# Patient Record
Sex: FEMALE | Race: OTHER | NOT HISPANIC OR LATINO | Employment: PART TIME | URBAN - METROPOLITAN AREA
[De-identification: names, ages, dates, MRNs, and addresses within clinical notes are randomized per-mention and may not be internally consistent; named-entity substitution may affect disease eponyms.]

---

## 2019-08-29 ENCOUNTER — HOSPITAL ENCOUNTER (OUTPATIENT)
Dept: RADIOLOGY | Facility: MEDICAL CENTER | Age: 29
End: 2019-08-29
Attending: NEUROLOGICAL SURGERY
Payer: MEDICAID

## 2019-08-29 DIAGNOSIS — M25.552 LEFT HIP PAIN: ICD-10-CM

## 2019-08-29 PROCEDURE — 73502 X-RAY EXAM HIP UNI 2-3 VIEWS: CPT | Mod: LT

## 2019-08-29 PROCEDURE — 73721 MRI JNT OF LWR EXTRE W/O DYE: CPT | Mod: LT

## 2019-08-30 ENCOUNTER — TELEPHONE (OUTPATIENT)
Dept: MEDICAL GROUP | Facility: MEDICAL CENTER | Age: 29
End: 2019-08-30

## 2020-02-16 ENCOUNTER — OFFICE VISIT (OUTPATIENT)
Dept: URGENT CARE | Facility: CLINIC | Age: 30
End: 2020-02-16
Payer: MEDICAID

## 2020-02-16 VITALS
DIASTOLIC BLOOD PRESSURE: 76 MMHG | SYSTOLIC BLOOD PRESSURE: 102 MMHG | HEART RATE: 72 BPM | RESPIRATION RATE: 18 BRPM | TEMPERATURE: 99 F | BODY MASS INDEX: 27.29 KG/M2 | WEIGHT: 139 LBS | OXYGEN SATURATION: 99 % | HEIGHT: 60 IN

## 2020-02-16 DIAGNOSIS — H66.92 ACUTE LEFT OTITIS MEDIA: ICD-10-CM

## 2020-02-16 DIAGNOSIS — J02.9 PHARYNGITIS, UNSPECIFIED ETIOLOGY: ICD-10-CM

## 2020-02-16 PROCEDURE — 99202 OFFICE O/P NEW SF 15 MIN: CPT | Performed by: FAMILY MEDICINE

## 2020-02-16 RX ORDER — RIZATRIPTAN BENZOATE 5 MG/1
TABLET ORAL
Status: ON HOLD | COMMUNITY
Start: 2019-12-11 | End: 2020-08-29

## 2020-02-16 RX ORDER — AMOXICILLIN 500 MG/1
500 CAPSULE ORAL 3 TIMES DAILY
Qty: 30 CAP | Refills: 0 | Status: ON HOLD | OUTPATIENT
Start: 2020-02-16 | End: 2020-08-29

## 2020-02-16 ASSESSMENT — ENCOUNTER SYMPTOMS
SHORTNESS OF BREATH: 0
COUGH: 1
FEVER: 0
SORE THROAT: 1
DIZZINESS: 0
VOMITING: 0
EYE REDNESS: 0
NAUSEA: 0
CHILLS: 0
MYALGIAS: 0

## 2020-02-16 NOTE — PROGRESS NOTES
Subjective:   Luba Lind is a 29 y.o. female who presents for Ear Pain ((L) both hurt but left is the worse,(L)-x4 days side of throat hurts,slight cough-x2 days)        29-year-old female presents to urgent care with chief complaint of left ear pain and left-sided hearing loss over the past 4 days.  Patient complains of sore throat, cough for the past 2 days.    Otalgia    There is pain in the left ear. This is a new problem. The current episode started in the past 7 days. Maximum temperature: Subjective fever. Associated symptoms include coughing and a sore throat. Pertinent negatives include no rash or vomiting.     PMH:  has a past medical history of Fetal pyelectasis and EIC. negative maternal quad screen. 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 She also has no past medical history of Addisons disease ([facility]), Adrenal disorder (HCC), Allergy, Anemia, Anxiety, Blood transfusion, CHF (congestive heart failure) ([facility]), Clotting disorder (HCC), COPD, Cushings syndrome ([facility]), Depression, Diabetic neuropathy (HCC), GERD (gastroesophageal reflux disease), Goiter, Heart attack ([facility]), HIV (human immunodeficiency virus infection), Hyperlipidemia, IBD (inflammatory bowel disease), Meningitis, Muscle disorder, OSTEOPOROSIS, Parathyroid disorder (HCC), Pituitary disease ([facility]), Sickle cell disease ([facility]), Substance abuse ([facility]), Thyroid disease, Ulcer, or Urinary tract infection, site not specified.  MEDS:   Current Outpatient Medications:   •  rizatriptan (MAXALT) 5 MG tablet, TAKE ONE TABLET BY MOUTH AS NEEDED FOR MIGRAINE, MAY REPEAT DOSE ONCE IN 2 HOURS, MAX 2 DOSES/DAY, Disp: , Rfl:   •  amoxicillin (AMOXIL) 500 MG Cap, Take 1 Cap by mouth 3 times a day., Disp: 30 Cap, Rfl: 0  •  ibuprofen (MOTRIN) 600 MG Tab, Take 1 Tab by mouth every 6 hours as needed (Cramping)., Disp: 30 Tab, Rfl: 3  •  ferrous sulfate 325 (65 FE) MG EC tablet, Take 1 Tab by mouth 3  times a day, with meals., Disp: 90 Tab, Rfl: 3  •  docusate sodium 100 MG Cap, Take 100 mg by mouth 2 times a day as needed for Constipation., Disp: 60 Cap, Rfl: 3  ALLERGIES: No Known Allergies  SURGHX: No past surgical history on file.  SOCHX:  reports that she quit smoking about 8 years ago. Her smoking use included cigarettes. She has a 0.25 pack-year smoking history. She quit smokeless tobacco use about 8 years ago. She reports current drug use. Drug: Marijuana. She reports that she does not drink alcohol.  FH: Family history was reviewed  Review of Systems   Constitutional: Negative for chills and fever.   HENT: Positive for ear pain and sore throat.    Eyes: Negative for redness.   Respiratory: Positive for cough. Negative for shortness of breath.    Cardiovascular: Negative for chest pain.   Gastrointestinal: Negative for nausea and vomiting.   Genitourinary: Negative for dysuria.   Musculoskeletal: Negative for myalgias.   Skin: Negative for rash.   Neurological: Negative for dizziness.   All other systems reviewed and are negative.       Objective:   /76 (BP Location: Right arm)   Pulse 72   Temp 37.2 °C (99 °F) (Temporal)   Resp 18   Ht 1.524 m (5')   Wt 63 kg (139 lb)   LMP 01/24/2020 (Exact Date)   SpO2 99%   BMI 27.15 kg/m²   Physical Exam  Vitals signs and nursing note reviewed.   Constitutional:       General: She is not in acute distress.     Appearance: She is well-developed.   HENT:      Head: Normocephalic and atraumatic.      Right Ear: External ear normal. Tympanic membrane is not erythematous or bulging.      Left Ear: External ear normal. Tympanic membrane is erythematous and bulging.      Nose: Mucosal edema and rhinorrhea present.      Right Turbinates: Swollen.      Left Turbinates: Swollen.      Mouth/Throat:      Mouth: Mucous membranes are moist.      Pharynx: Posterior oropharyngeal erythema present. No oropharyngeal exudate.      Tonsils: No tonsillar exudate or  tonsillar abscesses.   Eyes:      Conjunctiva/sclera: Conjunctivae normal.      Pupils: Pupils are equal, round, and reactive to light.   Cardiovascular:      Rate and Rhythm: Normal rate and regular rhythm.      Heart sounds: No murmur.   Pulmonary:      Effort: Pulmonary effort is normal. No respiratory distress.      Breath sounds: Normal breath sounds.   Abdominal:      General: There is no distension.      Palpations: Abdomen is soft.      Tenderness: There is no abdominal tenderness.   Musculoskeletal: Normal range of motion.   Skin:     General: Skin is warm and dry.   Neurological:      General: No focal deficit present.      Mental Status: She is alert and oriented to person, place, and time. Mental status is at baseline.      Gait: Gait (gait at baseline) normal.   Psychiatric:         Judgment: Judgment normal.           Assessment/Plan:   1. Acute left otitis media  - amoxicillin (AMOXIL) 500 MG Cap; Take 1 Cap by mouth 3 times a day.  Dispense: 30 Cap; Refill: 0    Other orders  - rizatriptan (MAXALT) 5 MG tablet; TAKE ONE TABLET BY MOUTH AS NEEDED FOR MIGRAINE, MAY REPEAT DOSE ONCE IN 2 HOURS, MAX 2 DOSES/DAY      Discussed close monitoring, return precautions, and supportive measures including maintaining adequate fluid hydration and caloric intake, relative rest and OTC symptom management including acetaminophen as needed for pain and/or fever.    Differential diagnosis, natural history, supportive care, and indications for immediate follow-up discussed.     Advised the patient to follow-up with the primary care physician for recheck, reevaluation, and consideration of further management.

## 2020-03-04 ENCOUNTER — OFFICE VISIT (OUTPATIENT)
Dept: URGENT CARE | Facility: CLINIC | Age: 30
End: 2020-03-04
Payer: MEDICAID

## 2020-03-04 VITALS
TEMPERATURE: 98.9 F | DIASTOLIC BLOOD PRESSURE: 76 MMHG | OXYGEN SATURATION: 97 % | RESPIRATION RATE: 12 BRPM | HEART RATE: 69 BPM | WEIGHT: 139.6 LBS | SYSTOLIC BLOOD PRESSURE: 104 MMHG | HEIGHT: 60 IN | BODY MASS INDEX: 27.41 KG/M2

## 2020-03-04 DIAGNOSIS — J40 BRONCHITIS: ICD-10-CM

## 2020-03-04 DIAGNOSIS — R06.2 WHEEZING: ICD-10-CM

## 2020-03-04 DIAGNOSIS — R05.9 COUGH: ICD-10-CM

## 2020-03-04 LAB
FLUAV+FLUBV AG SPEC QL IA: NEGATIVE
INT CON NEG: NEGATIVE
INT CON POS: POSITIVE

## 2020-03-04 PROCEDURE — 99214 OFFICE O/P EST MOD 30 MIN: CPT | Mod: 25 | Performed by: NURSE PRACTITIONER

## 2020-03-04 PROCEDURE — 94640 AIRWAY INHALATION TREATMENT: CPT | Performed by: NURSE PRACTITIONER

## 2020-03-04 PROCEDURE — 87804 INFLUENZA ASSAY W/OPTIC: CPT | Performed by: NURSE PRACTITIONER

## 2020-03-04 RX ORDER — METHYLPREDNISOLONE 4 MG/1
TABLET ORAL
Qty: 21 TAB | Refills: 0 | Status: ON HOLD | OUTPATIENT
Start: 2020-03-04 | End: 2020-08-29

## 2020-03-04 RX ORDER — ALBUTEROL SULFATE 90 UG/1
1-2 AEROSOL, METERED RESPIRATORY (INHALATION) EVERY 4 HOURS PRN
Qty: 1 INHALER | Refills: 0 | Status: ON HOLD | OUTPATIENT
Start: 2020-03-04 | End: 2020-08-30

## 2020-03-04 RX ORDER — DEXTROMETHORPHAN HYDROBROMIDE AND PROMETHAZINE HYDROCHLORIDE 15; 6.25 MG/5ML; MG/5ML
5 SYRUP ORAL EVERY 4 HOURS PRN
Qty: 120 ML | Refills: 0 | Status: SHIPPED | OUTPATIENT
Start: 2020-03-04 | End: 2020-03-09

## 2020-03-04 RX ORDER — ALBUTEROL SULFATE 2.5 MG/3ML
2.5 SOLUTION RESPIRATORY (INHALATION) ONCE
Status: COMPLETED | OUTPATIENT
Start: 2020-03-04 | End: 2020-03-04

## 2020-03-04 RX ADMIN — ALBUTEROL SULFATE 2.5 MG: 2.5 SOLUTION RESPIRATORY (INHALATION) at 17:24

## 2020-03-04 ASSESSMENT — ENCOUNTER SYMPTOMS
SORE THROAT: 1
COUGH: 1
VOMITING: 0
NAUSEA: 0
MYALGIAS: 0
WHEEZING: 1
STRIDOR: 0
EYE REDNESS: 0
PALPITATIONS: 0
CHILLS: 1
SHORTNESS OF BREATH: 1
EYE DISCHARGE: 0
HEADACHES: 0
FEVER: 0
ABDOMINAL PAIN: 0

## 2020-03-04 NOTE — PROGRESS NOTES
Subjective:   Luba Lind is a 29 y.o. female who presents for Cough (x 5 days. Pt. has cough, sinus/chest congestion, sore throat, bilateral earache and chills. Pt. had an ear infection on 02-16-20. )        Cough   This is a new problem. The current episode started in the past 7 days. The problem has been unchanged. The cough is non-productive. Associated symptoms include chills, ear pain, nasal congestion, postnasal drip, a sore throat, shortness of breath and wheezing. Pertinent negatives include no chest pain, eye redness, fever, headaches, myalgias or rash. She has tried OTC cough suppressant for the symptoms. The treatment provided no relief. There is no history of asthma or pneumonia.        Patient requesting flu swab due to children at home.    Review of Systems   Constitutional: Positive for chills. Negative for fever.   HENT: Positive for congestion, ear pain, postnasal drip and sore throat. Negative for ear discharge.    Eyes: Negative for discharge and redness.   Respiratory: Positive for cough, shortness of breath and wheezing. Negative for stridor.    Cardiovascular: Negative for chest pain and palpitations.   Gastrointestinal: Negative for abdominal pain, nausea and vomiting.   Musculoskeletal: Negative for myalgias.   Skin: Negative for itching and rash.   Neurological: Negative for headaches.   All other systems reviewed and are negative.    PMH:  has a past medical history of Fetal pyelectasis and EIC. negative maternal quad screen. 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 She also has no past medical history of Addisons disease ([facility]), Adrenal disorder ([facility]), Allergy, Anemia, Anxiety, Blood transfusion, CHF (congestive heart failure) ([facility]), Clotting disorder ([facility]), COPD, Cushings syndrome ([facility]), Depression, Diabetic neuropathy ([facility]), GERD (gastroesophageal reflux disease), Goiter, Heart attack ([facility]), HIV (human immunodeficiency  virus infection), Hyperlipidemia, IBD (inflammatory bowel disease), Meningitis, Muscle disorder, OSTEOPOROSIS, Parathyroid disorder (HCC), Pituitary disease (HCC), Sickle cell disease (HCC), Substance abuse (HCC), Thyroid disease, Ulcer, or Urinary tract infection, site not specified.  ALLERGIES: No Known Allergies    Patient's PMH, SocHx, SurgHx, FamHx, Drug allergies and medications reviewed.     Objective:   /76   Pulse 69   Temp 37.2 °C (98.9 °F) (Temporal)   Resp 12   Ht 1.524 m (5')   Wt 63.3 kg (139 lb 9.6 oz)   SpO2 97%   BMI 27.26 kg/m²   Physical Exam  Vitals signs reviewed.   Constitutional:       Appearance: She is well-developed.   HENT:      Head: Normocephalic.      Right Ear: Hearing, tympanic membrane and ear canal normal. No middle ear effusion. Tympanic membrane is not perforated or erythematous.      Left Ear: Hearing, tympanic membrane and ear canal normal.  No middle ear effusion. Tympanic membrane is not perforated or erythematous.      Nose: Mucosal edema and congestion present. No rhinorrhea.      Left Turbinates: Swollen.      Right Sinus: No maxillary sinus tenderness or frontal sinus tenderness.      Left Sinus: No maxillary sinus tenderness or frontal sinus tenderness.      Mouth/Throat:      Lips: Pink.      Mouth: Mucous membranes are moist.      Pharynx: Uvula midline. Oropharyngeal exudate present. No posterior oropharyngeal erythema or uvula swelling.      Tonsils: No tonsillar exudate.   Eyes:      General: Lids are normal.      Extraocular Movements: Extraocular movements intact.      Conjunctiva/sclera: Conjunctivae normal.      Pupils: Pupils are equal, round, and reactive to light.   Neck:      Musculoskeletal: Normal range of motion.      Thyroid: No thyromegaly.   Cardiovascular:      Rate and Rhythm: Normal rate and regular rhythm.      Heart sounds: Normal heart sounds.   Pulmonary:      Effort: Pulmonary effort is normal. No tachypnea, bradypnea, accessory  muscle usage, prolonged expiration or respiratory distress.      Breath sounds: Examination of the right-upper field reveals wheezing. Examination of the left-upper field reveals wheezing. Wheezing present.   Lymphadenopathy:      Head:      Right side of head: No submandibular or tonsillar adenopathy.      Left side of head: No submandibular or tonsillar adenopathy.   Skin:     General: Skin is warm and dry.      Findings: No rash.   Neurological:      Mental Status: She is alert and oriented to person, place, and time.   Psychiatric:         Mood and Affect: Mood normal.         Speech: Speech normal.         Behavior: Behavior normal. Behavior is cooperative.         Thought Content: Thought content normal.         Judgment: Judgment normal.           Assessment/Plan:   Assessment    1. Wheezing  albuterol (PROVENTIL) 2.5mg/3ml nebulizer solution 2.5 mg    POCT Influenza A/B    albuterol 108 (90 Base) MCG/ACT Aero Soln inhalation aerosol   2. Bronchitis  methylPREDNISolone (MEDROL DOSEPAK) 4 MG Tablet Therapy Pack    albuterol 108 (90 Base) MCG/ACT Aero Soln inhalation aerosol   3. Cough  promethazine-dextromethorphan (PROMETHAZINE-DM) 6.25-15 MG/5ML syrup     Flu negative  Albuterol breathing treatment given in office with much improvement to respiratory wheezing. Discussed proper inhaler use for at home.  Patient encouraged to increase clear liquid intake    Differential diagnosis, natural history, supportive care, and indications for immediate follow-up discussed.     **Please note that all invasive procedures during this visit were performed by myself and/or the Medical Assistant under the supervision of the PA or MD in office**

## 2020-08-29 ENCOUNTER — HOSPITAL ENCOUNTER (OUTPATIENT)
Facility: MEDICAL CENTER | Age: 30
End: 2020-08-30
Attending: OBSTETRICS & GYNECOLOGY | Admitting: OBSTETRICS & GYNECOLOGY
Payer: MEDICAID

## 2020-08-29 ENCOUNTER — HOSPITAL ENCOUNTER (OUTPATIENT)
Facility: MEDICAL CENTER | Age: 30
End: 2020-08-29
Admitting: OBSTETRICS & GYNECOLOGY
Payer: MEDICAID

## 2020-08-29 LAB
ABO GROUP BLD: NORMAL
BASOPHILS # BLD AUTO: 0.3 % (ref 0–1.8)
BASOPHILS # BLD: 0.02 K/UL (ref 0–0.12)
BLD GP AB SCN SERPL QL: NORMAL
EOSINOPHIL # BLD AUTO: 0.03 K/UL (ref 0–0.51)
EOSINOPHIL NFR BLD: 0.4 % (ref 0–6.9)
ERYTHROCYTE [DISTWIDTH] IN BLOOD BY AUTOMATED COUNT: 41.7 FL (ref 35.9–50)
HBV SURFACE AG SER QL: ABNORMAL
HCT VFR BLD AUTO: 35.2 % (ref 37–47)
HGB BLD-MCNC: 11.6 G/DL (ref 12–16)
HIV 1+2 AB+HIV1 P24 AG SERPL QL IA: NORMAL
IMM GRANULOCYTES # BLD AUTO: 0.02 K/UL (ref 0–0.11)
IMM GRANULOCYTES NFR BLD AUTO: 0.3 % (ref 0–0.9)
LYMPHOCYTES # BLD AUTO: 1.18 K/UL (ref 1–4.8)
LYMPHOCYTES NFR BLD: 17.5 % (ref 22–41)
MCH RBC QN AUTO: 25.6 PG (ref 27–33)
MCHC RBC AUTO-ENTMCNC: 33 G/DL (ref 33.6–35)
MCV RBC AUTO: 77.7 FL (ref 81.4–97.8)
MONOCYTES # BLD AUTO: 0.52 K/UL (ref 0–0.85)
MONOCYTES NFR BLD AUTO: 7.7 % (ref 0–13.4)
NEUTROPHILS # BLD AUTO: 4.97 K/UL (ref 2–7.15)
NEUTROPHILS NFR BLD: 73.8 % (ref 44–72)
NRBC # BLD AUTO: 0 K/UL
NRBC BLD-RTO: 0 /100 WBC
PLATELET # BLD AUTO: 208 K/UL (ref 164–446)
PMV BLD AUTO: 10.5 FL (ref 9–12.9)
RBC # BLD AUTO: 4.53 M/UL (ref 4.2–5.4)
RH BLD: NORMAL
RUBV AB SER QL: 137 IU/ML
TREPONEMA PALLIDUM IGG+IGM AB [PRESENCE] IN SERUM OR PLASMA BY IMMUNOASSAY: ABNORMAL
WBC # BLD AUTO: 6.7 K/UL (ref 4.8–10.8)

## 2020-08-29 PROCEDURE — G0378 HOSPITAL OBSERVATION PER HR: HCPCS

## 2020-08-29 PROCEDURE — 85025 COMPLETE CBC W/AUTO DIFF WBC: CPT

## 2020-08-29 PROCEDURE — 87340 HEPATITIS B SURFACE AG IA: CPT

## 2020-08-29 PROCEDURE — 86900 BLOOD TYPING SEROLOGIC ABO: CPT

## 2020-08-29 PROCEDURE — 96374 THER/PROPH/DIAG INJ IV PUSH: CPT

## 2020-08-29 PROCEDURE — 87389 HIV-1 AG W/HIV-1&-2 AB AG IA: CPT

## 2020-08-29 PROCEDURE — 700111 HCHG RX REV CODE 636 W/ 250 OVERRIDE (IP): Performed by: OBSTETRICS & GYNECOLOGY

## 2020-08-29 PROCEDURE — 86901 BLOOD TYPING SEROLOGIC RH(D): CPT

## 2020-08-29 PROCEDURE — 87086 URINE CULTURE/COLONY COUNT: CPT

## 2020-08-29 PROCEDURE — 99219 PR INITIAL OBSERVATION CARE,LEVL II: CPT | Performed by: OBSTETRICS & GYNECOLOGY

## 2020-08-29 PROCEDURE — 86762 RUBELLA ANTIBODY: CPT

## 2020-08-29 PROCEDURE — 86780 TREPONEMA PALLIDUM: CPT

## 2020-08-29 PROCEDURE — 86850 RBC ANTIBODY SCREEN: CPT

## 2020-08-29 PROCEDURE — 700105 HCHG RX REV CODE 258: Performed by: OBSTETRICS & GYNECOLOGY

## 2020-08-29 PROCEDURE — 36415 COLL VENOUS BLD VENIPUNCTURE: CPT

## 2020-08-29 RX ORDER — ONDANSETRON 4 MG/1
4 TABLET, ORALLY DISINTEGRATING ORAL EVERY 4 HOURS PRN
Status: DISCONTINUED | OUTPATIENT
Start: 2020-08-29 | End: 2020-08-30 | Stop reason: HOSPADM

## 2020-08-29 RX ORDER — BISACODYL 10 MG
10 SUPPOSITORY, RECTAL RECTAL
Status: DISCONTINUED | OUTPATIENT
Start: 2020-08-29 | End: 2020-08-30 | Stop reason: HOSPADM

## 2020-08-29 RX ORDER — DEXTROSE AND SODIUM CHLORIDE 5; .45 G/100ML; G/100ML
INJECTION, SOLUTION INTRAVENOUS CONTINUOUS
Status: DISCONTINUED | OUTPATIENT
Start: 2020-08-29 | End: 2020-08-30 | Stop reason: HOSPADM

## 2020-08-29 RX ORDER — AMOXICILLIN 250 MG
2 CAPSULE ORAL 2 TIMES DAILY
Status: DISCONTINUED | OUTPATIENT
Start: 2020-08-29 | End: 2020-08-30 | Stop reason: HOSPADM

## 2020-08-29 RX ORDER — PROMETHAZINE HYDROCHLORIDE 25 MG/1
12.5-25 SUPPOSITORY RECTAL EVERY 4 HOURS PRN
Status: DISCONTINUED | OUTPATIENT
Start: 2020-08-29 | End: 2020-08-30 | Stop reason: HOSPADM

## 2020-08-29 RX ORDER — ACETAMINOPHEN 325 MG/1
650 TABLET ORAL EVERY 6 HOURS PRN
Status: DISCONTINUED | OUTPATIENT
Start: 2020-08-29 | End: 2020-08-30 | Stop reason: HOSPADM

## 2020-08-29 RX ORDER — ONDANSETRON 2 MG/ML
4 INJECTION INTRAMUSCULAR; INTRAVENOUS EVERY 4 HOURS PRN
Status: DISCONTINUED | OUTPATIENT
Start: 2020-08-29 | End: 2020-08-30 | Stop reason: HOSPADM

## 2020-08-29 RX ORDER — PROMETHAZINE HYDROCHLORIDE 25 MG/1
12.5-25 TABLET ORAL EVERY 4 HOURS PRN
Status: DISCONTINUED | OUTPATIENT
Start: 2020-08-29 | End: 2020-08-30 | Stop reason: HOSPADM

## 2020-08-29 RX ORDER — POLYETHYLENE GLYCOL 3350 17 G/17G
1 POWDER, FOR SOLUTION ORAL
Status: DISCONTINUED | OUTPATIENT
Start: 2020-08-29 | End: 2020-08-30 | Stop reason: HOSPADM

## 2020-08-29 RX ORDER — PROCHLORPERAZINE EDISYLATE 5 MG/ML
5-10 INJECTION INTRAMUSCULAR; INTRAVENOUS EVERY 4 HOURS PRN
Status: DISCONTINUED | OUTPATIENT
Start: 2020-08-29 | End: 2020-08-30 | Stop reason: HOSPADM

## 2020-08-29 RX ADMIN — PROCHLORPERAZINE EDISYLATE 5 MG: 5 INJECTION INTRAMUSCULAR; INTRAVENOUS at 21:15

## 2020-08-29 RX ADMIN — ONDANSETRON 4 MG: 2 INJECTION INTRAMUSCULAR; INTRAVENOUS at 18:26

## 2020-08-29 RX ADMIN — DEXTROSE AND SODIUM CHLORIDE 1000 ML: 5; 450 INJECTION, SOLUTION INTRAVENOUS at 18:27

## 2020-08-29 ASSESSMENT — COGNITIVE AND FUNCTIONAL STATUS - GENERAL
MOBILITY SCORE: 24
SUGGESTED CMS G CODE MODIFIER DAILY ACTIVITY: CH
DAILY ACTIVITIY SCORE: 24
SUGGESTED CMS G CODE MODIFIER MOBILITY: CH

## 2020-08-29 ASSESSMENT — PATIENT HEALTH QUESTIONNAIRE - PHQ9
SUM OF ALL RESPONSES TO PHQ9 QUESTIONS 1 AND 2: 0
1. LITTLE INTEREST OR PLEASURE IN DOING THINGS: NOT AT ALL
2. FEELING DOWN, DEPRESSED, IRRITABLE, OR HOPELESS: NOT AT ALL

## 2020-08-29 ASSESSMENT — LIFESTYLE VARIABLES
HOW MANY TIMES IN THE PAST YEAR HAVE YOU HAD 5 OR MORE DRINKS IN A DAY: 0
TOTAL SCORE: 0
HAVE PEOPLE ANNOYED YOU BY CRITICIZING YOUR DRINKING: NO
EVER FELT BAD OR GUILTY ABOUT YOUR DRINKING: NO
HAVE YOU EVER FELT YOU SHOULD CUT DOWN ON YOUR DRINKING: NO
ON A TYPICAL DAY WHEN YOU DRINK ALCOHOL HOW MANY DRINKS DO YOU HAVE: 0
CONSUMPTION TOTAL: NEGATIVE
ALCOHOL_USE: NO
EVER HAD A DRINK FIRST THING IN THE MORNING TO STEADY YOUR NERVES TO GET RID OF A HANGOVER: NO
TOTAL SCORE: 0
EVER_SMOKED: NEVER
AVERAGE NUMBER OF DAYS PER WEEK YOU HAVE A DRINK CONTAINING ALCOHOL: 0
TOTAL SCORE: 0

## 2020-08-29 NOTE — PROGRESS NOTES
Received direct admit transfer request from [facility].  Sending Physician: Cristhian HANSEN  Specialist consulted: Dr. Calhoun  Diagnosis: intractable nausea and vomiting  Patient accepted by: Dr. Calhoun  Patient coming via: ground EMS  ETA: TBD  Nursing to notify Dr. Calhoun once patient arrives on unit.

## 2020-08-29 NOTE — LETTER
August 30, 2020      To whom it may concern,         Luba Lind was admitted to [facility] on 8/27/2020.  She was then transferred to [facility] for higher acuity care.  She remained in the hospital until 8/30/2020, however, she will need time at home for recovery.  Please excuse the patient from work until she is allowed to return on 9/3/2020.         Thank you,        Tracy Mclean D.O.  [facility]  [phone]    Electronically Signed     no

## 2020-08-30 VITALS
WEIGHT: 130.07 LBS | DIASTOLIC BLOOD PRESSURE: 82 MMHG | HEIGHT: 62 IN | TEMPERATURE: 97.8 F | SYSTOLIC BLOOD PRESSURE: 119 MMHG | HEART RATE: 77 BPM | BODY MASS INDEX: 23.94 KG/M2 | RESPIRATION RATE: 16 BRPM | OXYGEN SATURATION: 98 %

## 2020-08-30 PROBLEM — O21.0 HYPEREMESIS AFFECTING PREGNANCY, ANTEPARTUM: Status: ACTIVE | Noted: 2020-08-30

## 2020-08-30 LAB
ANION GAP SERPL CALC-SCNC: 12 MMOL/L (ref 7–16)
BASOPHILS # BLD AUTO: 0.5 % (ref 0–1.8)
BASOPHILS # BLD: 0.03 K/UL (ref 0–0.12)
BUN SERPL-MCNC: 7 MG/DL (ref 8–22)
CALCIUM SERPL-MCNC: 8.9 MG/DL (ref 8.5–10.5)
CHLORIDE SERPL-SCNC: 102 MMOL/L (ref 96–112)
CO2 SERPL-SCNC: 18 MMOL/L (ref 20–33)
COVID ORDER STATUS COVID19: NORMAL
CREAT SERPL-MCNC: 0.41 MG/DL (ref 0.5–1.4)
EOSINOPHIL # BLD AUTO: 0.07 K/UL (ref 0–0.51)
EOSINOPHIL NFR BLD: 1.2 % (ref 0–6.9)
ERYTHROCYTE [DISTWIDTH] IN BLOOD BY AUTOMATED COUNT: 42.8 FL (ref 35.9–50)
GLUCOSE SERPL-MCNC: 100 MG/DL (ref 65–99)
HCT VFR BLD AUTO: 33.8 % (ref 37–47)
HGB BLD-MCNC: 11.1 G/DL (ref 12–16)
IMM GRANULOCYTES # BLD AUTO: 0.02 K/UL (ref 0–0.11)
IMM GRANULOCYTES NFR BLD AUTO: 0.3 % (ref 0–0.9)
LYMPHOCYTES # BLD AUTO: 1.31 K/UL (ref 1–4.8)
LYMPHOCYTES NFR BLD: 22.5 % (ref 22–41)
MCH RBC QN AUTO: 26.2 PG (ref 27–33)
MCHC RBC AUTO-ENTMCNC: 32.8 G/DL (ref 33.6–35)
MCV RBC AUTO: 79.9 FL (ref 81.4–97.8)
MONOCYTES # BLD AUTO: 0.6 K/UL (ref 0–0.85)
MONOCYTES NFR BLD AUTO: 10.3 % (ref 0–13.4)
NEUTROPHILS # BLD AUTO: 3.78 K/UL (ref 2–7.15)
NEUTROPHILS NFR BLD: 65.2 % (ref 44–72)
NRBC # BLD AUTO: 0 K/UL
NRBC BLD-RTO: 0 /100 WBC
PLATELET # BLD AUTO: 199 K/UL (ref 164–446)
PMV BLD AUTO: 10.9 FL (ref 9–12.9)
POTASSIUM SERPL-SCNC: 3.3 MMOL/L (ref 3.6–5.5)
RBC # BLD AUTO: 4.23 M/UL (ref 4.2–5.4)
SARS-COV-2 RNA RESP QL NAA+PROBE: NOTDETECTED
SODIUM SERPL-SCNC: 132 MMOL/L (ref 135–145)
SPECIMEN SOURCE: NORMAL
WBC # BLD AUTO: 5.8 K/UL (ref 4.8–10.8)

## 2020-08-30 PROCEDURE — A9270 NON-COVERED ITEM OR SERVICE: HCPCS | Performed by: OBSTETRICS & GYNECOLOGY

## 2020-08-30 PROCEDURE — C9803 HOPD COVID-19 SPEC COLLECT: HCPCS | Performed by: OBSTETRICS & GYNECOLOGY

## 2020-08-30 PROCEDURE — G0378 HOSPITAL OBSERVATION PER HR: HCPCS

## 2020-08-30 PROCEDURE — 36415 COLL VENOUS BLD VENIPUNCTURE: CPT

## 2020-08-30 PROCEDURE — U0003 INFECTIOUS AGENT DETECTION BY NUCLEIC ACID (DNA OR RNA); SEVERE ACUTE RESPIRATORY SYNDROME CORONAVIRUS 2 (SARS-COV-2) (CORONAVIRUS DISEASE [COVID-19]), AMPLIFIED PROBE TECHNIQUE, MAKING USE OF HIGH THROUGHPUT TECHNOLOGIES AS DESCRIBED BY CMS-2020-01-R: HCPCS

## 2020-08-30 PROCEDURE — 85025 COMPLETE CBC W/AUTO DIFF WBC: CPT

## 2020-08-30 PROCEDURE — 99217 PR OBSERVATION CARE DISCHARGE: CPT | Performed by: OBSTETRICS & GYNECOLOGY

## 2020-08-30 PROCEDURE — 700111 HCHG RX REV CODE 636 W/ 250 OVERRIDE (IP): Performed by: OBSTETRICS & GYNECOLOGY

## 2020-08-30 PROCEDURE — 80048 BASIC METABOLIC PNL TOTAL CA: CPT

## 2020-08-30 PROCEDURE — 700102 HCHG RX REV CODE 250 W/ 637 OVERRIDE(OP): Performed by: OBSTETRICS & GYNECOLOGY

## 2020-08-30 PROCEDURE — 700105 HCHG RX REV CODE 258: Performed by: OBSTETRICS & GYNECOLOGY

## 2020-08-30 RX ORDER — PROCHLORPERAZINE MALEATE 5 MG/1
5 TABLET ORAL EVERY 6 HOURS PRN
Qty: 30 TAB | Refills: 1 | Status: SHIPPED | OUTPATIENT
Start: 2020-08-30 | End: 2020-09-03

## 2020-08-30 RX ORDER — ONDANSETRON 4 MG/1
4 TABLET, ORALLY DISINTEGRATING ORAL EVERY 4 HOURS PRN
Qty: 30 TAB | Refills: 0 | Status: SHIPPED | OUTPATIENT
Start: 2020-08-30 | End: 2020-09-03

## 2020-08-30 RX ORDER — ACETAMINOPHEN 325 MG/1
650 TABLET ORAL EVERY 6 HOURS PRN
Qty: 30 TAB | Refills: 0 | Status: SHIPPED | OUTPATIENT
Start: 2020-08-30 | End: 2020-09-03

## 2020-08-30 RX ADMIN — ACETAMINOPHEN 650 MG: 325 TABLET, FILM COATED ORAL at 13:17

## 2020-08-30 RX ADMIN — DEXTROSE AND SODIUM CHLORIDE 1000 ML: 5; 450 INJECTION, SOLUTION INTRAVENOUS at 06:43

## 2020-08-30 RX ADMIN — ONDANSETRON 4 MG: 4 TABLET, ORALLY DISINTEGRATING ORAL at 13:17

## 2020-08-30 RX ADMIN — ONDANSETRON 4 MG: 4 TABLET, ORALLY DISINTEGRATING ORAL at 07:32

## 2020-08-30 SDOH — ECONOMIC STABILITY: FOOD INSECURITY: WITHIN THE PAST 12 MONTHS, YOU WORRIED THAT YOUR FOOD WOULD RUN OUT BEFORE YOU GOT MONEY TO BUY MORE.: PATIENT DECLINED

## 2020-08-30 SDOH — ECONOMIC STABILITY: TRANSPORTATION INSECURITY
IN THE PAST 12 MONTHS, HAS THE LACK OF TRANSPORTATION KEPT YOU FROM MEDICAL APPOINTMENTS OR FROM GETTING MEDICATIONS?: PATIENT DECLINED

## 2020-08-30 SDOH — ECONOMIC STABILITY: TRANSPORTATION INSECURITY
IN THE PAST 12 MONTHS, HAS LACK OF TRANSPORTATION KEPT YOU FROM MEETINGS, WORK, OR FROM GETTING THINGS NEEDED FOR DAILY LIVING?: PATIENT DECLINED

## 2020-08-30 SDOH — ECONOMIC STABILITY: FOOD INSECURITY: WITHIN THE PAST 12 MONTHS, THE FOOD YOU BOUGHT JUST DIDN'T LAST AND YOU DIDN'T HAVE MONEY TO GET MORE.: PATIENT DECLINED

## 2020-08-30 ASSESSMENT — COPD QUESTIONNAIRES
IN THE PAST 12 MONTHS DO YOU DO LESS THAN YOU USED TO BECAUSE OF YOUR BREATHING PROBLEMS: DISAGREE/UNSURE
DO YOU EVER COUGH UP ANY MUCUS OR PHLEGM?: NO/ONLY WITH OCCASIONAL COLDS OR INFECTIONS
HAVE YOU SMOKED AT LEAST 100 CIGARETTES IN YOUR ENTIRE LIFE: NO/DON'T KNOW
DURING THE PAST 4 WEEKS HOW MUCH DID YOU FEEL SHORT OF BREATH: NONE/LITTLE OF THE TIME

## 2020-08-30 ASSESSMENT — COGNITIVE AND FUNCTIONAL STATUS - GENERAL
SUGGESTED CMS G CODE MODIFIER DAILY ACTIVITY: CH
MOBILITY SCORE: 24
SUGGESTED CMS G CODE MODIFIER MOBILITY: CH
DAILY ACTIVITIY SCORE: 24

## 2020-08-30 ASSESSMENT — LIFESTYLE VARIABLES: EVER_SMOKED: YES

## 2020-08-30 NOTE — PROGRESS NOTES
S: nausea better.  Able to eat.  Denies lof, vb, cramping.  Wants to go home.     O:   Vitals:    20 0800   BP: 119/82   Pulse: 77   Resp: 16   Temp: 36.6 °C (97.8 °F)   SpO2: 98%     Gen: nNAD, resting comfortably  Abd: soft nt nd  Ext no PIA  Skin: normal    A/P: 31 yo  @ 6 wks by [facility] (no official records available)  - okay to go home  - Rx for zofran prn and compazine scheduled   - risks of prolonged QT interval discussed with patient and recommend taking compazine regularly and zofran sublinguinal as needed for break through nausea  - f/u as scheduled in office or earlier if not improving    Tracy Mclean D.O.

## 2020-08-30 NOTE — PROGRESS NOTES
Patient received into T433-2.  Patient AOx4. Patient answers questions appropriately.  RA, denies SOB  Pt reports 3/10 pain in abdomen.  Pt currently nauseas. Last vomited approximately at 1600 with scant emesis.   Patient ambulatory with stand by assist.  +void, +flatus, LBM 8/27    Call light within reach. All needs met at this time.

## 2020-08-30 NOTE — CARE PLAN
Problem: Safety  Goal: Will remain free from injury  Outcome: PROGRESSING AS EXPECTED   Pt educated on fall precaution. Call light within reach.    Problem: Bowel/Gastric:  Goal: Normal bowel function is maintained or improved  Outcome: PROGRESSING AS EXPECTED   Antiemetics given PRN.

## 2020-08-30 NOTE — PROGRESS NOTES
Patient requesting a note for work. Will endorse to AM RN to seek from MD. Covid swab performed. Patient expressed nervousness about Covid screening if roommate positive and being around children. Reassured patient our facility would contract trace if that were the case. Verbalized understanding.

## 2020-08-30 NOTE — DISCHARGE INSTRUCTIONS
Discharge Instructions    Discharged to home by car with relative. Discharged via wheelchair, hospital escort: Yes.  Special equipment needed: Not Applicable    Be sure to schedule a follow-up appointment with your primary care doctor or any specialists as instructed.      Discharge Plan:   Patient okay to go back to work Thursday September 3, 2020.    Diet Plan: Discussed  Activity Level: Discussed  Smoking Cessation Offered: Patient Refused  Confirmed Follow up Appointment: Appointment Scheduled  Confirmed Symptoms Management: Discussed  Medication Reconciliation Updated: Yes    I understand that a diet low in cholesterol, fat, and sodium is recommended for good health. Unless I have been given specific instructions below for another diet, I accept this instruction as my diet prescription.        Special Instructions: None    · Is patient discharged on Warfarin / Coumadin?   No     Depression / Suicide Risk    As you are discharged from this [facility] facility, it is important to learn how to keep safe from harming yourself.    Recognize the warning signs:  · Abrupt changes in personality, positive or negative- including increase in energy   · Giving away possessions  · Change in eating patterns- significant weight changes-  positive or negative  · Change in sleeping patterns- unable to sleep or sleeping all the time   · Unwillingness or inability to communicate  · Depression  · Unusual sadness, discouragement and loneliness  · Talk of wanting to die  · Neglect of personal appearance   · Rebelliousness- reckless behavior  · Withdrawal from people/activities they love  · Confusion- inability to concentrate     If you or a loved one observes any of these behaviors or has concerns about self-harm, here's what you can do:  · Talk about it- your feelings and reasons for harming yourself  · Remove any means that you might use to hurt yourself (examples: pills, rope, extension cords, firearm)  · Get professional help  from the community (Mental Health, Substance Abuse, psychological counseling)  · Do not be alone:Call your Safe Contact- someone whom you trust who will be there for you.  · Call your local CRISIS HOTLINE 127-5245 or 101-357-0492  · Call your local Children's Mobile Crisis Response Team [place] (068) 546-6039 or www.[employer]  · Call the toll free National Suicide Prevention Hotlines   · National Suicide Prevention Lifeline 683-479-GOCN (5082)  · National Hope Line Network 800-SUICIDE (141-6994)

## 2020-08-30 NOTE — DISCHARGE SUMMARY
Discharge Summary    CHIEF COMPLAINT ON ADMISSION  No chief complaint on file.      Reason for Admission  Intractable Nausea and Vomiting     Admission Date  8/29/2020    CODE STATUS  Full Code    HPI & HOSPITAL COURSE  This is a 30 y.o. female here with hyperemesis.  Transferred from [facility].  Patient became stable with PO diet with zofran and compazine regimen and ready to go home.          Therefore, she is discharged in good and stable condition to home with close outpatient follow-up.    The patient recovered much more quickly than anticipated on admission.    Discharge Date  8/30/2020    FOLLOW UP ITEMS POST DISCHARGE  F/u in office to establish care    DISCHARGE DIAGNOSES  Active Problems:    * No active hospital problems. *  Resolved Problems:    * No resolved hospital problems. *      FOLLOW UP  Future Appointments   Date Time Provider Department Center   9/9/2020  9:30 AM Luis Escobedo M.D. [facility]     No follow-up provider specified.    MEDICATIONS ON DISCHARGE     Medication List      ASK your doctor about these medications      Instructions   PRENATAL VITAMIN PO   Take 1 Tab by mouth every day.  Dose: 1 Tab            Allergies  No Known Allergies    DIET  Orders Placed This Encounter   Procedures   • Diet Order Regular (no fish/seafood)     Standing Status:   Standing     Number of Occurrences:   1     Order Specific Question:   Diet:     Answer:   Regular [1]     Comments:   no fish/seafood       ACTIVITY  As tolerated.  Weight bearing as tolerated    CONSULTATIONS  none    PROCEDURES  none    LABORATORY  Lab Results   Component Value Date    SODIUM 132 (L) 08/30/2020    POTASSIUM 3.3 (L) 08/30/2020    CHLORIDE 102 08/30/2020    CO2 18 (L) 08/30/2020    GLUCOSE 100 (H) 08/30/2020    BUN 7 (L) 08/30/2020    CREATININE 0.41 (L) 08/30/2020        Lab Results   Component Value Date    WBC 5.8 08/30/2020    HEMOGLOBIN 11.1 (L) 08/30/2020    HEMATOCRIT 33.8 (L) 08/30/2020    PLATELETCT 199  08/30/2020        Total time of the discharge process exceeds 30 minutes.

## 2020-08-30 NOTE — PROGRESS NOTES
Received report from AM RN; assumed care. 2 RN skin check completed. Boyfriend bedside (discussed being a Randolph Mack). Patient denied SOB, numbness, tingling, vomiting. Nausea reported. Medicated; see MAR. Patient discussed endoscopy at 14 y/o resulting in GI bleed due to being nicked, smaller than average esophagus. No difficulty swallowing. Patient stated PIV usually blow; 2nd PIV placed. Patient tolerated multiple attempts. Abdomen soft, mildly tender. + void, dark yellow urine noted. UA sent to lab. + flatus. LBM 08/27/2020. POC/possible discharge discussed. Questions answered. Bed locked/lowest position. Call light/personal belongings within reach. All needs met/patient sleeping at present time.

## 2020-08-30 NOTE — PROGRESS NOTES
Patient discharged home with significant other and staff escort. IV discontinued with tip intact. Prescriptions given to patient, verbalized understanding, consent signed and in chart. Discharge instructions given regarding medication administration, diet, and follow up appointment.  Education provided, patient asked questions and verbalized understanding. Discharge paperwork signed and in chart. Patient is tolerating diet, stable when ambulating, and pain is well controlled. All belongings collected. No further questions or concerns at this time.

## 2020-08-30 NOTE — PROGRESS NOTES
Report received. Assessment complete.  AAOx4. Patient calls appropriately.  RA, denies SOB  Pt reports pain 0/10.  Pt denies N/V tolerating clear liquid diet, advanced to regular diet for breakfast. Will PO challenge for breakfast. Zofran ODT given.  Pt ambulatory with no asisst.  + void, + flatus, LBM PTA    Plan of care reviewed with patient. Call light and frequently used belongings within reach. All needs met at this time.

## 2020-08-30 NOTE — H&P
HISTORY AND PHYSICAL    PATIENT ID:  NAME:  Luba Lind  MRN:               [id number]  YOB: 1990    CC:  Nausea and vomiting in early pregnancy    HPI:  Luba Lind is a 30 y.o.  at about 6 weeks by ultrasound performed at  today (per patient - records not yet available) who has been transferred for persistent nausea and vomiting.   Patient reports that prior to pregnancy she weighed about 142lb and she is down to 130lbs today.  She found out she was pregnant about 10 days ago and started feeling nauseous about a week ago.  Since Tuesday she has been vomiting and has been unable to keep down any food.  Yesterday she stopped being able to keep down water and started feeling very weak so she decided she needed to come into the ER.  She went to [facility] and was given some fluids and antiemetics.  She feels better right now and wants to try some water but isn't sure she can keep it down.  She has had some cramping but no vaginal bleeding.  In her prior pregnancies she had nausea and vomiting in the first trimester which was well controlled with dissolvable zofran.  Has never been hospitalized for it before.      ROS: Patient denies any fever chills, nausea, vomiting, headache, chest pain, shortness of breath, or dysuria or unusual swelling of hands or feet.     OB Hx:  OB History    Para Term  AB Living   3 2 2     2   SAB TAB Ectopic Molar Multiple Live Births             2      # Outcome Date GA Lbr Anthony/2nd Weight Sex Delivery Anes PTL Lv   3             2 Term 12 38w2d  3.345 kg (7 lb 6 oz) F Vag-Spont EPI N JOSE   1 Term 01/04/10 40w0d  3.969 kg (8 lb 12 oz) M Vag-Spont EPI  JOSE      Birth Comments: denies complications.       GYN History:  Menarche @ 11.  Menses regular, lasting 5 days, not particularly heavy.  Hasn't had pap for 4 yrs, denies h/o abnormal pap, nor history of cone biopsy, LEEP or any other cervical, uterine or gynecologic surgery.  No history  of sexually transmitted diseases.  Prior contraception: OCPs.    PMH/Problem List:    Past Medical History:   Diagnosis Date   • Fetal pyelectasis and EIC. negative maternal quad screen. declined amniocentesis 2015   • Headache(784.0)     before and after pregnancy.   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     Patient Active Problem List    Diagnosis Date Noted   • Active labor 2016   • Labor and delivery indication for care or intervention 2016   • High-risk pregnancy in second trimester 2015   • Neurofibromatosis, type 1 (von Recklinghausen's disease) (Formerly [facility]) 2015   • Rh negative status during pregnancy- rhogam given 2012       Current Outpatient Medications:  No current facility-administered medications on file prior to encounter.      Current Outpatient Medications on File Prior to Encounter   Medication Sig Dispense Refill   • Prenatal Vit-Fe Fumarate-FA (PRENATAL VITAMIN PO) Take 1 Tab by mouth every day.     • albuterol 108 (90 Base) MCG/ACT Aero Soln inhalation aerosol Inhale 1-2 Puffs by mouth every four hours as needed for Shortness of Breath. 1 Inhaler 0       PSH:    No past surgical history on file.    Allergies:   No Known Allergies    SH:  Social History     Socioeconomic History   • Marital status: Single     Spouse name: Not on file   • Number of children: Not on file   • Years of education: Not on file   • Highest education level: Not on file   Occupational History   • Not on file   Social Needs   • Financial resource strain: Not on file   • Food insecurity     Worry: Not on file     Inability: Not on file   • Transportation needs     Medical: Not on file     Non-medical: Not on file   Tobacco Use   • Smoking status: Former Smoker     Packs/day: 0.50     Years: 0.50     Pack years: 0.25     Types: Cigarettes     Quit date: 2012     Years since quittin.6   • Smokeless tobacco: Former User     Quit date: 2012   •  "Tobacco comment: 1 cig every week or 2 weeks.   Substance and Sexual Activity   • Alcohol use: No   • Drug use: Yes     Types: Marijuana     Comment: Last smoked Marijuana 2015   • Sexual activity: Yes     Partners: Male   Lifestyle   • Physical activity     Days per week: Not on file     Minutes per session: Not on file   • Stress: Not on file   Relationships   • Social connections     Talks on phone: Not on file     Gets together: Not on file     Attends Mu-ism service: Not on file     Active member of club or organization: Not on file     Attends meetings of clubs or organizations: Not on file     Relationship status: Not on file   • Intimate partner violence     Fear of current or ex partner: Not on file     Emotionally abused: Not on file     Physically abused: Not on file     Forced sexual activity: Not on file   Other Topics Concern   • Not on file   Social History Narrative   • Not on file         PHYSICAL EXAM:  Vitals:    20 1721   BP: 102/69   Pulse: 80   Resp: 16   Temp: 37.4 °C (99.3 °F)   TempSrc: Temporal   SpO2: 98%   Weight: 59 kg (130 lb 1.1 oz)   Height: 1.58 m (5' 2.21\")     Temp (24hrs), Av.4 °C (99.3 °F), Min:37.4 °C (99.3 °F), Max:37.4 °C (99.3 °F)    General: No acute distress, resting comfortably in bed.  HEENT: normocephalic, nontraumatic, PERRLA, EOMI  Cardiovascular:  Distal Pulses 2+  Respiratory: nonlabored breathing on RA  Abdomen: soft, nontender  Musculoskeletal: strength 5/5 in four extremities  Neuro: non focal with no numbness, tingling or changes in sensation      A/P: 30 y.o.  @ 6wks by outside [place]  #N/V in pregnancy.  Will admit for observation.  Will give fluids, antiemetics and PO challenge.    --fu CBC/CMP and replete electrolytes as needed  --likely DC with dissolving sublingual zofran as this worked for patient last pregnancy  #Early pregnancy.  Viable early IUP per verbal report from transferring doc and patient.  Records supposedly on the way.  " Will follow up.  --discussed establishing prenatal care at [facility] - patient amenable  #Dispo.  Observe overnight - likely home tomorrow pending ability to tolerate PO      Petra Calhoun D.O.  [facility], Women's Health

## 2020-08-30 NOTE — CARE PLAN
Problem: Communication  Goal: The ability to communicate needs accurately and effectively will improve  Outcome: PROGRESSING AS EXPECTED  Patient able to articulate needs. Utilizes call light appropriately.     Problem: Bowel/Gastric:  Goal: Normal bowel function is maintained or improved  Outcome: PROGRESSING AS EXPECTED  Nauseous. Medicated; effective per patient report.     Problem: Fluid Volume:  Goal: Will maintain balanced intake and output  Outcome: PROGRESSING AS EXPECTED  Patient tolerating MIVF; low urine output noted. Monitoring.

## 2020-09-01 LAB
BACTERIA UR CULT: NORMAL
SIGNIFICANT IND 70042: NORMAL
SITE SITE: NORMAL
SOURCE SOURCE: NORMAL

## 2020-09-02 ENCOUNTER — NURSE TRIAGE (OUTPATIENT)
Dept: HEALTH INFORMATION MANAGEMENT | Facility: OTHER | Age: 30
End: 2020-09-02

## 2020-09-02 ENCOUNTER — OFFICE VISIT (OUTPATIENT)
Dept: URGENT CARE | Facility: CLINIC | Age: 30
End: 2020-09-02
Payer: MEDICAID

## 2020-09-02 VITALS
DIASTOLIC BLOOD PRESSURE: 72 MMHG | HEIGHT: 60 IN | OXYGEN SATURATION: 100 % | HEART RATE: 102 BPM | WEIGHT: 134.48 LBS | RESPIRATION RATE: 18 BRPM | TEMPERATURE: 96.6 F | SYSTOLIC BLOOD PRESSURE: 116 MMHG | BODY MASS INDEX: 26.4 KG/M2

## 2020-09-02 DIAGNOSIS — R11.2 NON-INTRACTABLE VOMITING WITH NAUSEA, UNSPECIFIED VOMITING TYPE: ICD-10-CM

## 2020-09-02 PROCEDURE — 99214 OFFICE O/P EST MOD 30 MIN: CPT | Performed by: PHYSICIAN ASSISTANT

## 2020-09-02 RX ORDER — DOXYLAMINE SUCCINATE AND PYRIDOXINE HYDROCHLORIDE, DELAYED RELEASE TABLETS 10 MG/10 MG 10; 10 MG/1; MG/1
20 TABLET, DELAYED RELEASE ORAL
Qty: 30 TAB | Refills: 0 | Status: SHIPPED | OUTPATIENT
Start: 2020-09-02 | End: 2020-09-03

## 2020-09-02 ASSESSMENT — ENCOUNTER SYMPTOMS
CHILLS: 0
DIARRHEA: 0
COUGH: 0
WEAKNESS: 1
SHORTNESS OF BREATH: 0
VOMITING: 1
FEVER: 0
HEMOPTYSIS: 0
NUMBER OF EPISODES OF EMESIS TODAY: 1
ABDOMINAL PAIN: 1
HEADACHES: 1
NAUSEA: 1
EYES NEGATIVE: 1
WHEEZING: 0

## 2020-09-02 NOTE — PATIENT INSTRUCTIONS
Hyperemesis Gravidarum  Hyperemesis gravidarum is a severe form of nausea and vomiting that happens during pregnancy. Hyperemesis is worse than morning sickness. It may cause you to have nausea or vomiting all day for many days. It may keep you from eating and drinking enough food and liquids, which can lead to dehydration, malnutrition, and weight loss. Hyperemesis usually occurs during the first half (the first 20 weeks) of pregnancy. It often goes away once a woman is in her second half of pregnancy. However, sometimes hyperemesis continues through an entire pregnancy.  What are the causes?  The cause of this condition is not known. It may be related to changes in chemicals (hormones) in the body during pregnancy, such as the high level of pregnancy hormone (human chorionic gonadotropin) or the increase in the female sex hormone (estrogen).  What are the signs or symptoms?  Symptoms of this condition include:  · Nausea that does not go away.  · Vomiting that does not allow you to keep any food down.  · Weight loss.  · Body fluid loss (dehydration).  · Having no desire to eat, or not liking food that you have previously enjoyed.  How is this diagnosed?  This condition may be diagnosed based on:  · A physical exam.  · Your medical history.  · Your symptoms.  · Blood tests.  · Urine tests.  How is this treated?  This condition is managed by controlling symptoms. This may include:  · Following an eating plan. This can help lessen nausea and vomiting.  · Taking prescription medicines.  An eating plan and medicines are often used together to help control symptoms. If medicines do not help relieve nausea and vomiting, you may need to receive fluids through an IV at the hospital.  Follow these instructions at home:  Eating and drinking    · Avoid the following:  ? Drinking fluids with meals. Try not to drink anything during the 30 minutes before and after your meals.  ? Drinking more than 1 cup of fluid at a  time.  ? Eating foods that trigger your symptoms. These may include spicy foods, coffee, high-fat foods, very sweet foods, and acidic foods.  ? Skipping meals. Nausea can be more intense on an empty stomach. If you cannot tolerate food, do not force it. Try sucking on ice chips or other frozen items and make up for missed calories later.  ? Lying down within 2 hours after eating.  ? Being exposed to environmental triggers. These may include food smells, smoky rooms, closed spaces, rooms with strong smells, warm or humid places, overly loud and noisy rooms, and rooms with motion or flickering lights. Try eating meals in a well-ventilated area that is free of strong smells.  ? Quick and sudden changes in your movement.  ? Taking iron pills and multivitamins that contain iron. If you take prescription iron pills, do not stop taking them unless your health care provider approves.  ? Preparing food. The smell of food can spoil your appetite or trigger nausea.  · To help relieve your symptoms:  ? Listen to your body. Everyone is different and has different preferences. Find what works best for you.  ? Eat and drink slowly.  ? Eat 5-6 small meals daily instead of 3 large meals. Eating small meals and snacks can help you avoid an empty stomach.  ? In the morning, before getting out of bed, eat a couple of crackers to avoid moving around on an empty stomach.  ? Try eating starchy foods as these are usually tolerated well. Examples include cereal, toast, bread, potatoes, pasta, rice, and pretzels.  ? Include at least 1 serving of protein with your meals and snacks. Protein options include lean meats, poultry, seafood, beans, nuts, nut butters, eggs, cheese, and yogurt.  ? Try eating a protein-rich snack before bed. Examples of a protein-radha snack include cheese and crackers or a peanut butter sandwich made with 1 slice of whole-wheat bread and 1 tsp (5 g) of peanut butter.  ? Eat or suck on things that have andreas in them.  It may help relieve nausea. Add ¼ tsp ground ginger to hot tea or choose ginger tea.  ? Try drinking 100% fruit juice or an electrolyte drink. An electrolyte drink contains sodium, potassium, and chloride.  ? Drink fluids that are cold, clear, and carbonated or sour. Examples include lemonade, ginger ale, lemon-lime soda, ice water, and sparkling water.  ? Brush your teeth or use a mouth rinse after meals.  ? Talk with your health care provider about starting a supplement of vitamin B6.  General instructions  · Take over-the-counter and prescription medicines only as told by your health care provider.  · Follow instructions from your health care provider about eating or drinking restrictions.  · Continue to take your prenatal vitamins as told by your health care provider. If you are having trouble taking your prenatal vitamins, talk with your health care provider about different options.  · Keep all follow-up and pre-birth (prenatal) visits as told by your health care provider. This is important.  Contact a health care provider if:  · You have pain in your abdomen.  · You have a severe headache.  · You have vision problems.  · You are losing weight.  · You feel weak or dizzy.  Get help right away if:  · You cannot drink fluids without vomiting.  · You vomit blood.  · You have constant nausea and vomiting.  · You are very weak.  · You faint.  · You have a fever and your symptoms suddenly get worse.  Summary  · Hyperemesis gravidarum is a severe form of nausea and vomiting that happens during pregnancy.  · Making some changes to your eating habits may help relieve nausea and vomiting.  · This condition may be managed with medicine.  · If medicines do not help relieve nausea and vomiting, you may need to receive fluids through an IV at the hospital.  This information is not intended to replace advice given to you by your health care provider. Make sure you discuss any questions you have with your health care  provider.  Document Released: 12/18/2006 Document Revised: 01/07/2019 Document Reviewed: 08/16/2017  Elsevier Patient Education © 2020 [employer]

## 2020-09-02 NOTE — PROGRESS NOTES
Subjective:   Luba Lind is a 30 y.o. female who presents for Emesis (x2 weeks, went to ER saturday and sunday 8/30/2020. still feeling sick, 6 weeks pregnant, emesis, bodyaches )      Emesis  Associated symptoms include abdominal pain, headaches, nausea, vomiting and weakness. Pertinent negatives include no chest pain, chills, coughing or fever.   Patient is a 30-year-old female with a history of 1 week of nausea and vomiting and recent pregnancy.  Patient states she is about 6 weeks pregnant and confirmed pregnancy at the hospital on 08/29/2020.  She was admitted to the hospital for observation, IV fluids, labs, and treated with Compazine and Zofran.  Patient rapidly improved and her symptoms resolved.  Symptoms returned yesterday with nausea and vomiting.  She states her symptoms are almost as bad as they were at the hospital 4 days ago.  Associated headache.   Associated generalized weakness.  Mild diffuse abdominal tenderness.  Denies any fevers, chills, dizziness, LOC, cough, chest pain, diarrhea, vaginal discharge, vaginal bleeding.  She states she has been tolerating some fluids with the help of Compazine.  However, she states Compazine makes her tired.  Zofran does not help.    Review of Systems   Constitutional: Positive for malaise/fatigue. Negative for chills and fever.   HENT: Negative.    Eyes: Negative.    Respiratory: Negative for cough, hemoptysis, shortness of breath and wheezing.    Cardiovascular: Negative for chest pain.   Gastrointestinal: Positive for abdominal pain, nausea and vomiting. Negative for diarrhea.   Genitourinary: Negative.         Negative vaginal bleeding   Neurological: Positive for weakness and headaches.       Medications:    • acetaminophen Tabs  • ondansetron Tbdp  • PRENATAL VITAMIN PO  • prochlorperazine Tabs    Allergies: Patient has no known allergies.    Problem List: Luba Lind has Neurofibromatosis, type 1 (von Recklinghausen's disease) (HCC) and  Hyperemesis affecting pregnancy, antepartum on their problem list.    Surgical History:  Past Surgical History:   Procedure Laterality Date   • OTHER      14 y/o endoscopy; GI bleed resulting       Past Social Hx: Luba Lind  reports that she quit smoking about 8 years ago. Her smoking use included cigarettes. She has a 0.25 pack-year smoking history. She quit smokeless tobacco use about 8 years ago. She reports previous alcohol use. She reports previous drug use. Drugs: Marijuana and Inhaled.     Past Family Hx:  Luba Lind family history includes Arthritis in her maternal grandmother; Cancer in her maternal grandmother; Migraines in her mother; Other in her mother.     Problem list, medications, and allergies reviewed by myself today in Epic.     Objective:     /72   Pulse (!) 102   Temp 35.9 °C (96.6 °F) (Temporal)   Resp 18   Ht 1.524 m (5')   Wt 61 kg (134 lb 7.7 oz)   LMP 07/23/2020   SpO2 100%   BMI 26.26 kg/m²     Physical Exam  Vitals signs reviewed.   Constitutional:       General: She is not in acute distress.     Appearance: Normal appearance. She is not ill-appearing or toxic-appearing.   HENT:      Mouth/Throat:      Mouth: Mucous membranes are dry.      Pharynx: Oropharynx is clear. No oropharyngeal exudate or posterior oropharyngeal erythema.   Eyes:      Conjunctiva/sclera: Conjunctivae normal.      Pupils: Pupils are equal, round, and reactive to light.   Neck:      Musculoskeletal: Neck supple.   Cardiovascular:      Rate and Rhythm: Normal rate.      Heart sounds: Normal heart sounds.   Pulmonary:      Effort: Pulmonary effort is normal. No respiratory distress.      Breath sounds: Normal breath sounds. No wheezing, rhonchi or rales.   Abdominal:      General: Abdomen is flat. There is no distension.      Palpations: Abdomen is soft. There is no mass.      Tenderness: There is no right CVA tenderness, left CVA tenderness, guarding or rebound.      Comments: Mild  diffuse generalized abdominal tenderness.   Lymphadenopathy:      Cervical: No cervical adenopathy.   Skin:     General: Skin is warm and dry.   Neurological:      General: No focal deficit present.      Mental Status: She is alert and oriented to person, place, and time.   Psychiatric:         Mood and Affect: Mood normal.         Behavior: Behavior normal.         Assessment/Plan:     Diagnosis and associated orders:     1. Non-intractable vomiting with nausea, unspecified vomiting type  Doxylamine-Pyridoxine 10-10 MG Tablet Delayed Response delayed-release tablet      Comments/MDM:     • Trial of doxylamine/pyridoxine as scheduled.  Continue Compazine as directed and per 's instructions as needed for nausea and vomiting.  Avoid operate machinery or driving while on this medication as it causes drowsiness. Avoid zofran if this does not help.   • Normal heart rate on exam.  Normal blood pressure.  Does appear mildly to moderately dehydrated.   • Push fluids and electrolytes. [place] diet. Avoid exacerbating factors.   • Information on Hyperemesis gravidarum handed to patient.   • Recommended if no improvement in the next 12 hours, to present to the emergency room for further evaluation and treatment.   • Also recommended presenting emergency room with any worsening abdominal pain, nausea, vomiting, vaginal bleeding, fevers, chills, unable tolerate fluids.       Red flags discussed and indications to immediately call 911 or present to the Emergency Department.   Supportive care, differential diagnoses, and indications for immediate follow-up discussed with patient.    Pathogenesis of diagnosis discussed including typical length and natural progression. Patient expresses understanding and agrees to plan.    Advised the patient to follow-up with the primary care physician or OBGYN for recheck, reevaluation, and consideration of further management.    Please note that this dictation was created using voice  recognition software. I have made a reasonable attempt to correct obvious errors, but I expect that there are errors of grammar and possibly content that I did not discover before finalizing the note.    This note was electronically signed by Porter Chicas PA-C

## 2020-09-02 NOTE — TELEPHONE ENCOUNTER
"    Reason for Disposition  • SEVERE vomiting (e.g., > 10 times / day) and present > 8 hours    Additional Information  • Negative: Sounds like a life-threatening emergency to the triager  • Negative: Vaginal bleeding and pregnant < 20 weeks  • Negative: Abdominal pain and pregnant < 20 weeks  • Negative: Headache is the main complaint  • Negative: Vomiting red blood or black (coffee ground) material  • Negative: Insulin-dependent diabetes (Type I) and glucose > 400 mg/dL (22 mmol/L)  • Negative: Recent head injury (within last 3 days)  • Negative: Recent abdominal injury (within last 3 days)  • Negative: Severe pain in one eye    Answer Assessment - Initial Assessment Questions  1. VOMITING SEVERITY: \"How many times have you vomited  in the past 24 hours?\"      - MILD: 1 - 2 times/day     - MODERATE:  3 - 7 times/day     - SEVERE:  8 or more times/day, vomits everything or nearly everything, weight loss       Moderate at least twice daily  2. ONSET: \"When did the vomiting begin?\"       3 days  3. FLUIDS: \"What fluids or food have you vomited up today?\" \"Are you able to keep any liquids down?\"      Same bottle of water taking sips from for three days  4. TREATMENT: \"What have you been doing so far to treat this?\"       zofran  5. DEHYDRATION: \"When was the last time you urinated?\" \"Are you feeling lightheaded?\" \"Weight loss?\"      Yesterday at 2200  6. PREGNANCY: \"How many weeks pregnant are you?\" \"How has the pregnancy been going?\"      6 weeks  7. JAZMYNE: \"What date are you expecting to deliver?\"      unknown  8. MEDICATIONS: \"What medications are you taking?\" (e.g., prenatal vitamins, iron)     Zofran, prenatal vitamins, compazine  9. OTHER SYMPTOMS: \"Do you have any other symptoms?\"      headache    Protocols used: PREGNANCY - MORNING SICKNESS (NAUSEA AND VOMITING OF PREGNANCY)-A-OH      "

## 2020-09-03 ENCOUNTER — HOSPITAL ENCOUNTER (OUTPATIENT)
Facility: MEDICAL CENTER | Age: 30
End: 2020-09-05
Attending: EMERGENCY MEDICINE | Admitting: INTERNAL MEDICINE
Payer: MEDICAID

## 2020-09-03 DIAGNOSIS — R11.2 INTRACTABLE VOMITING WITH NAUSEA, UNSPECIFIED VOMITING TYPE: ICD-10-CM

## 2020-09-03 DIAGNOSIS — O21.0 HYPEREMESIS GRAVIDARUM: ICD-10-CM

## 2020-09-03 DIAGNOSIS — R07.9 CHEST PAIN, UNSPECIFIED TYPE: ICD-10-CM

## 2020-09-03 DIAGNOSIS — E86.0 DEHYDRATION: ICD-10-CM

## 2020-09-03 PROBLEM — E87.1 HYPONATREMIA: Status: ACTIVE | Noted: 2020-09-03

## 2020-09-03 LAB
ALBUMIN SERPL BCP-MCNC: 4.7 G/DL (ref 3.2–4.9)
ALBUMIN/GLOB SERPL: 1.6 G/DL
ALP SERPL-CCNC: 57 U/L (ref 30–99)
ALT SERPL-CCNC: 8 U/L (ref 2–50)
ANION GAP SERPL CALC-SCNC: 20 MMOL/L (ref 7–16)
AST SERPL-CCNC: 9 U/L (ref 12–45)
BASOPHILS # BLD AUTO: 0.5 % (ref 0–1.8)
BASOPHILS # BLD: 0.04 K/UL (ref 0–0.12)
BILIRUB SERPL-MCNC: 0.7 MG/DL (ref 0.1–1.5)
BUN SERPL-MCNC: 12 MG/DL (ref 8–22)
CALCIUM SERPL-MCNC: 10.5 MG/DL (ref 8.5–10.5)
CHLORIDE SERPL-SCNC: 97 MMOL/L (ref 96–112)
CO2 SERPL-SCNC: 15 MMOL/L (ref 20–33)
CREAT SERPL-MCNC: 0.36 MG/DL (ref 0.5–1.4)
EKG IMPRESSION: NORMAL
EOSINOPHIL # BLD AUTO: 0.01 K/UL (ref 0–0.51)
EOSINOPHIL NFR BLD: 0.1 % (ref 0–6.9)
ERYTHROCYTE [DISTWIDTH] IN BLOOD BY AUTOMATED COUNT: 42 FL (ref 35.9–50)
GLOBULIN SER CALC-MCNC: 2.9 G/DL (ref 1.9–3.5)
GLUCOSE SERPL-MCNC: 84 MG/DL (ref 65–99)
HCT VFR BLD AUTO: 42.3 % (ref 37–47)
HGB BLD-MCNC: 14.4 G/DL (ref 12–16)
IMM GRANULOCYTES # BLD AUTO: 0.02 K/UL (ref 0–0.11)
IMM GRANULOCYTES NFR BLD AUTO: 0.3 % (ref 0–0.9)
LYMPHOCYTES # BLD AUTO: 1.15 K/UL (ref 1–4.8)
LYMPHOCYTES NFR BLD: 14.4 % (ref 22–41)
MCH RBC QN AUTO: 25.7 PG (ref 27–33)
MCHC RBC AUTO-ENTMCNC: 34 G/DL (ref 33.6–35)
MCV RBC AUTO: 75.5 FL (ref 81.4–97.8)
MONOCYTES # BLD AUTO: 0.57 K/UL (ref 0–0.85)
MONOCYTES NFR BLD AUTO: 7.2 % (ref 0–13.4)
NEUTROPHILS # BLD AUTO: 6.18 K/UL (ref 2–7.15)
NEUTROPHILS NFR BLD: 77.5 % (ref 44–72)
NRBC # BLD AUTO: 0 K/UL
NRBC BLD-RTO: 0 /100 WBC
PLATELET # BLD AUTO: 233 K/UL (ref 164–446)
PMV BLD AUTO: 10.6 FL (ref 9–12.9)
POTASSIUM SERPL-SCNC: 3.8 MMOL/L (ref 3.6–5.5)
PROT SERPL-MCNC: 7.6 G/DL (ref 6–8.2)
RBC # BLD AUTO: 5.6 M/UL (ref 4.2–5.4)
SODIUM SERPL-SCNC: 132 MMOL/L (ref 135–145)
TROPONIN T SERPL-MCNC: <6 NG/L (ref 6–19)
WBC # BLD AUTO: 8 K/UL (ref 4.8–10.8)

## 2020-09-03 PROCEDURE — 36415 COLL VENOUS BLD VENIPUNCTURE: CPT

## 2020-09-03 PROCEDURE — 99220 PR INITIAL OBSERVATION CARE,LEVL III: CPT | Performed by: INTERNAL MEDICINE

## 2020-09-03 PROCEDURE — G0378 HOSPITAL OBSERVATION PER HR: HCPCS

## 2020-09-03 PROCEDURE — 700105 HCHG RX REV CODE 258: Performed by: EMERGENCY MEDICINE

## 2020-09-03 PROCEDURE — 700111 HCHG RX REV CODE 636 W/ 250 OVERRIDE (IP): Performed by: INTERNAL MEDICINE

## 2020-09-03 PROCEDURE — 93005 ELECTROCARDIOGRAM TRACING: CPT

## 2020-09-03 PROCEDURE — 80053 COMPREHEN METABOLIC PANEL: CPT

## 2020-09-03 PROCEDURE — 700111 HCHG RX REV CODE 636 W/ 250 OVERRIDE (IP): Performed by: EMERGENCY MEDICINE

## 2020-09-03 PROCEDURE — 84484 ASSAY OF TROPONIN QUANT: CPT

## 2020-09-03 PROCEDURE — 99285 EMERGENCY DEPT VISIT HI MDM: CPT

## 2020-09-03 PROCEDURE — 700102 HCHG RX REV CODE 250 W/ 637 OVERRIDE(OP): Performed by: EMERGENCY MEDICINE

## 2020-09-03 PROCEDURE — 93005 ELECTROCARDIOGRAM TRACING: CPT | Performed by: EMERGENCY MEDICINE

## 2020-09-03 PROCEDURE — 96375 TX/PRO/DX INJ NEW DRUG ADDON: CPT

## 2020-09-03 PROCEDURE — A9270 NON-COVERED ITEM OR SERVICE: HCPCS | Performed by: EMERGENCY MEDICINE

## 2020-09-03 PROCEDURE — 700105 HCHG RX REV CODE 258: Performed by: INTERNAL MEDICINE

## 2020-09-03 PROCEDURE — 96374 THER/PROPH/DIAG INJ IV PUSH: CPT

## 2020-09-03 PROCEDURE — 85025 COMPLETE CBC W/AUTO DIFF WBC: CPT

## 2020-09-03 RX ORDER — PROCHLORPERAZINE MALEATE 5 MG/1
5 TABLET ORAL EVERY 6 HOURS PRN
COMMUNITY
End: 2020-09-14 | Stop reason: SDUPTHER

## 2020-09-03 RX ORDER — PROMETHAZINE HYDROCHLORIDE 25 MG/1
12.5-25 TABLET ORAL EVERY 4 HOURS PRN
Status: DISCONTINUED | OUTPATIENT
Start: 2020-09-03 | End: 2020-09-05 | Stop reason: HOSPADM

## 2020-09-03 RX ORDER — BISACODYL 10 MG
10 SUPPOSITORY, RECTAL RECTAL
Status: DISCONTINUED | OUTPATIENT
Start: 2020-09-03 | End: 2020-09-05 | Stop reason: HOSPADM

## 2020-09-03 RX ORDER — PROMETHAZINE HYDROCHLORIDE 25 MG/1
12.5-25 SUPPOSITORY RECTAL EVERY 4 HOURS PRN
Status: DISCONTINUED | OUTPATIENT
Start: 2020-09-03 | End: 2020-09-05 | Stop reason: HOSPADM

## 2020-09-03 RX ORDER — ONDANSETRON 4 MG/1
4 TABLET, ORALLY DISINTEGRATING ORAL EVERY 6 HOURS PRN
Status: ON HOLD | COMMUNITY
End: 2020-09-05 | Stop reason: SDUPTHER

## 2020-09-03 RX ORDER — CALCIUM CARBONATE 500 MG/1
500 TABLET, CHEWABLE ORAL 2 TIMES DAILY PRN
Status: DISCONTINUED | OUTPATIENT
Start: 2020-09-03 | End: 2020-09-05 | Stop reason: HOSPADM

## 2020-09-03 RX ORDER — DOXYLAMINE SUCCINATE AND PYRIDOXINE HYDROCHLORIDE, DELAYED RELEASE TABLETS 10 MG/10 MG 10; 10 MG/1; MG/1
10-20 TABLET, DELAYED RELEASE ORAL 2 TIMES DAILY
Status: ON HOLD | COMMUNITY
End: 2020-09-05

## 2020-09-03 RX ORDER — ALUMINA, MAGNESIA, AND SIMETHICONE 2400; 2400; 240 MG/30ML; MG/30ML; MG/30ML
10 SUSPENSION ORAL ONCE
Status: COMPLETED | OUTPATIENT
Start: 2020-09-03 | End: 2020-09-03

## 2020-09-03 RX ORDER — AMOXICILLIN 250 MG
2 CAPSULE ORAL 2 TIMES DAILY
Status: DISCONTINUED | OUTPATIENT
Start: 2020-09-03 | End: 2020-09-05 | Stop reason: HOSPADM

## 2020-09-03 RX ORDER — SODIUM CHLORIDE 9 MG/ML
1000 INJECTION, SOLUTION INTRAVENOUS ONCE
Status: COMPLETED | OUTPATIENT
Start: 2020-09-03 | End: 2020-09-03

## 2020-09-03 RX ORDER — PROCHLORPERAZINE EDISYLATE 5 MG/ML
5-10 INJECTION INTRAMUSCULAR; INTRAVENOUS EVERY 4 HOURS PRN
Status: DISCONTINUED | OUTPATIENT
Start: 2020-09-03 | End: 2020-09-05 | Stop reason: HOSPADM

## 2020-09-03 RX ORDER — POLYETHYLENE GLYCOL 3350 17 G/17G
1 POWDER, FOR SOLUTION ORAL
Status: DISCONTINUED | OUTPATIENT
Start: 2020-09-03 | End: 2020-09-03

## 2020-09-03 RX ORDER — PYRIDOXINE HCL (VITAMIN B6) 25 MG
25 TABLET ORAL 3 TIMES DAILY
Status: DISCONTINUED | OUTPATIENT
Start: 2020-09-03 | End: 2020-09-05 | Stop reason: HOSPADM

## 2020-09-03 RX ORDER — SODIUM CHLORIDE 9 MG/ML
INJECTION, SOLUTION INTRAVENOUS CONTINUOUS
Status: DISCONTINUED | OUTPATIENT
Start: 2020-09-03 | End: 2020-09-05 | Stop reason: HOSPADM

## 2020-09-03 RX ORDER — VITAMIN A ACETATE, BETA CAROTENE, ASCORBIC ACID, CHOLECALCIFEROL, .ALPHA.-TOCOPHEROL ACETATE, DL-, THIAMINE MONONITRATE, RIBOFLAVIN, NIACINAMIDE, PYRIDOXINE HYDROCHLORIDE, FOLIC ACID, CYANOCOBALAMIN, CALCIUM CARBONATE, FERROUS FUMARATE, ZINC OXIDE, CUPRIC OXIDE 3080; 12; 120; 400; 1; 1.84; 3; 20; 22; 920; 25; 200; 27; 10; 2 [IU]/1; UG/1; MG/1; [IU]/1; MG/1; MG/1; MG/1; MG/1; MG/1; [IU]/1; MG/1; MG/1; MG/1; MG/1; MG/1
1 TABLET, FILM COATED ORAL DAILY
Status: DISCONTINUED | OUTPATIENT
Start: 2020-09-04 | End: 2020-09-05 | Stop reason: HOSPADM

## 2020-09-03 RX ORDER — PROCHLORPERAZINE EDISYLATE 5 MG/ML
10 INJECTION INTRAMUSCULAR; INTRAVENOUS ONCE
Status: COMPLETED | OUTPATIENT
Start: 2020-09-03 | End: 2020-09-03

## 2020-09-03 RX ORDER — ONDANSETRON 4 MG/1
4 TABLET, ORALLY DISINTEGRATING ORAL EVERY 4 HOURS PRN
Status: DISCONTINUED | OUTPATIENT
Start: 2020-09-03 | End: 2020-09-05 | Stop reason: HOSPADM

## 2020-09-03 RX ORDER — ONDANSETRON 2 MG/ML
4 INJECTION INTRAMUSCULAR; INTRAVENOUS EVERY 4 HOURS PRN
Status: DISCONTINUED | OUTPATIENT
Start: 2020-09-03 | End: 2020-09-05 | Stop reason: HOSPADM

## 2020-09-03 RX ORDER — ACETAMINOPHEN 325 MG/1
650 TABLET ORAL EVERY 6 HOURS PRN
Status: DISCONTINUED | OUTPATIENT
Start: 2020-09-03 | End: 2020-09-05 | Stop reason: HOSPADM

## 2020-09-03 RX ADMIN — SODIUM CHLORIDE 1000 ML: 9 INJECTION, SOLUTION INTRAVENOUS at 18:23

## 2020-09-03 RX ADMIN — ALUMINUM HYDROXIDE, MAGNESIUM HYDROXIDE, AND DIMETHICONE 10 ML: 400; 400; 40 SUSPENSION ORAL at 20:17

## 2020-09-03 RX ADMIN — PROCHLORPERAZINE EDISYLATE 10 MG: 5 INJECTION INTRAMUSCULAR; INTRAVENOUS at 18:39

## 2020-09-03 RX ADMIN — SODIUM CHLORIDE: 9 INJECTION, SOLUTION INTRAVENOUS at 21:30

## 2020-09-03 RX ADMIN — ONDANSETRON 4 MG: 2 INJECTION INTRAMUSCULAR; INTRAVENOUS at 22:01

## 2020-09-03 ASSESSMENT — ENCOUNTER SYMPTOMS
VOMITING: 1
DIARRHEA: 0
TINGLING: 0
SPEECH CHANGE: 0
BLURRED VISION: 0
BLOOD IN STOOL: 0
PHOTOPHOBIA: 0
BACK PAIN: 0
HEADACHES: 0
DOUBLE VISION: 0
NECK PAIN: 0
FOCAL WEAKNESS: 0
CONSTIPATION: 1
COUGH: 0
HALLUCINATIONS: 0
ORTHOPNEA: 0
ABDOMINAL PAIN: 1
FEVER: 0
MYALGIAS: 0
WEIGHT LOSS: 0
EYE PAIN: 0
SPUTUM PRODUCTION: 0
SENSORY CHANGE: 0
CHILLS: 0
PALPITATIONS: 0
NAUSEA: 1
SHORTNESS OF BREATH: 0
TREMORS: 0
DIZZINESS: 0

## 2020-09-03 ASSESSMENT — LIFESTYLE VARIABLES
TOTAL SCORE: 0
CONSUMPTION TOTAL: NEGATIVE
ALCOHOL_USE: NO
SUBSTANCE_ABUSE: 0
HOW MANY TIMES IN THE PAST YEAR HAVE YOU HAD 5 OR MORE DRINKS IN A DAY: 0
HAVE PEOPLE ANNOYED YOU BY CRITICIZING YOUR DRINKING: NO
DO YOU DRINK ALCOHOL: NO
EVER FELT BAD OR GUILTY ABOUT YOUR DRINKING: NO
TOTAL SCORE: 0
EVER HAD A DRINK FIRST THING IN THE MORNING TO STEADY YOUR NERVES TO GET RID OF A HANGOVER: NO
AVERAGE NUMBER OF DAYS PER WEEK YOU HAVE A DRINK CONTAINING ALCOHOL: 0
TOTAL SCORE: 0
ON A TYPICAL DAY WHEN YOU DRINK ALCOHOL HOW MANY DRINKS DO YOU HAVE: 0
HAVE YOU EVER FELT YOU SHOULD CUT DOWN ON YOUR DRINKING: NO

## 2020-09-03 ASSESSMENT — PATIENT HEALTH QUESTIONNAIRE - PHQ9
2. FEELING DOWN, DEPRESSED, IRRITABLE, OR HOPELESS: NOT AT ALL
1. LITTLE INTEREST OR PLEASURE IN DOING THINGS: NOT AT ALL
SUM OF ALL RESPONSES TO PHQ9 QUESTIONS 1 AND 2: 0

## 2020-09-03 ASSESSMENT — FIBROSIS 4 INDEX: FIB4 SCORE: 0.42

## 2020-09-03 ASSESSMENT — PAIN DESCRIPTION - PAIN TYPE: TYPE: ACUTE PAIN

## 2020-09-03 NOTE — ED TRIAGE NOTES
"Luba Lind 30 y.o. female to triage via w/c with significant other for     Chief Complaint   Patient presents with   • Chest Pain     onset today, \"I dont' remember when\", \"it feels weird like my heart is racing and hurts\"   • Weakness     since discharge on    • N/V     since dicharge on ; reports not keeping fluids down; reports urinated x 2 today; last BM over 1 week ago   • Constipation   • Pregnancy     pt reports approx 6 weeks pregnant, LMP , , will establish with GYN on 20     Pt pale, appears fatigued.  Pt denies vag bleeding.  Pt has been taking zofran and compazine without relief.  EKG done during triage.  /87   Pulse (!) 136   Temp 37.8 °C (100 °F) (Temporal)   Resp 18   Ht 1.524 m (5')   Wt 64.4 kg (142 lb)   LMP 2020   SpO2 95%   BMI 27.73 kg/m²   Pt assisted into a w/c and to the lobby to await bed assignment.  Charge RN aware of patient.  Advised to return to triage desk for any changes/concerns.  "

## 2020-09-04 ENCOUNTER — PATIENT OUTREACH (OUTPATIENT)
Dept: HEALTH INFORMATION MANAGEMENT | Facility: OTHER | Age: 30
End: 2020-09-04

## 2020-09-04 LAB
ALBUMIN SERPL BCP-MCNC: 3.8 G/DL (ref 3.2–4.9)
ALBUMIN/GLOB SERPL: 1.6 G/DL
ALP SERPL-CCNC: 50 U/L (ref 30–99)
ALT SERPL-CCNC: 5 U/L (ref 2–50)
ANION GAP SERPL CALC-SCNC: 15 MMOL/L (ref 7–16)
AST SERPL-CCNC: 9 U/L (ref 12–45)
BASOPHILS # BLD AUTO: 0.5 % (ref 0–1.8)
BASOPHILS # BLD: 0.03 K/UL (ref 0–0.12)
BILIRUB SERPL-MCNC: 0.6 MG/DL (ref 0.1–1.5)
BUN SERPL-MCNC: 11 MG/DL (ref 8–22)
CALCIUM SERPL-MCNC: 9.2 MG/DL (ref 8.5–10.5)
CHLORIDE SERPL-SCNC: 104 MMOL/L (ref 96–112)
CO2 SERPL-SCNC: 15 MMOL/L (ref 20–33)
CREAT SERPL-MCNC: 0.38 MG/DL (ref 0.5–1.4)
EOSINOPHIL # BLD AUTO: 0.03 K/UL (ref 0–0.51)
EOSINOPHIL NFR BLD: 0.5 % (ref 0–6.9)
ERYTHROCYTE [DISTWIDTH] IN BLOOD BY AUTOMATED COUNT: 43.7 FL (ref 35.9–50)
GLOBULIN SER CALC-MCNC: 2.4 G/DL (ref 1.9–3.5)
GLUCOSE SERPL-MCNC: 79 MG/DL (ref 65–99)
HCT VFR BLD AUTO: 36.8 % (ref 37–47)
HGB BLD-MCNC: 12.1 G/DL (ref 12–16)
IMM GRANULOCYTES # BLD AUTO: 0.01 K/UL (ref 0–0.11)
IMM GRANULOCYTES NFR BLD AUTO: 0.2 % (ref 0–0.9)
LYMPHOCYTES # BLD AUTO: 1.11 K/UL (ref 1–4.8)
LYMPHOCYTES NFR BLD: 18.5 % (ref 22–41)
MAGNESIUM SERPL-MCNC: 1.7 MG/DL (ref 1.5–2.5)
MCH RBC QN AUTO: 25.8 PG (ref 27–33)
MCHC RBC AUTO-ENTMCNC: 32.9 G/DL (ref 33.6–35)
MCV RBC AUTO: 78.5 FL (ref 81.4–97.8)
MONOCYTES # BLD AUTO: 0.42 K/UL (ref 0–0.85)
MONOCYTES NFR BLD AUTO: 7 % (ref 0–13.4)
NEUTROPHILS # BLD AUTO: 4.41 K/UL (ref 2–7.15)
NEUTROPHILS NFR BLD: 73.3 % (ref 44–72)
NRBC # BLD AUTO: 0 K/UL
NRBC BLD-RTO: 0 /100 WBC
PLATELET # BLD AUTO: 189 K/UL (ref 164–446)
PMV BLD AUTO: 10.8 FL (ref 9–12.9)
POTASSIUM SERPL-SCNC: 3.7 MMOL/L (ref 3.6–5.5)
PROT SERPL-MCNC: 6.2 G/DL (ref 6–8.2)
RBC # BLD AUTO: 4.69 M/UL (ref 4.2–5.4)
SODIUM SERPL-SCNC: 134 MMOL/L (ref 135–145)
WBC # BLD AUTO: 6 K/UL (ref 4.8–10.8)

## 2020-09-04 PROCEDURE — 80053 COMPREHEN METABOLIC PANEL: CPT

## 2020-09-04 PROCEDURE — 83735 ASSAY OF MAGNESIUM: CPT

## 2020-09-04 PROCEDURE — G0378 HOSPITAL OBSERVATION PER HR: HCPCS

## 2020-09-04 PROCEDURE — 700102 HCHG RX REV CODE 250 W/ 637 OVERRIDE(OP): Performed by: HOSPITALIST

## 2020-09-04 PROCEDURE — 700111 HCHG RX REV CODE 636 W/ 250 OVERRIDE (IP): Performed by: INTERNAL MEDICINE

## 2020-09-04 PROCEDURE — 85025 COMPLETE CBC W/AUTO DIFF WBC: CPT

## 2020-09-04 PROCEDURE — 96375 TX/PRO/DX INJ NEW DRUG ADDON: CPT

## 2020-09-04 PROCEDURE — A9270 NON-COVERED ITEM OR SERVICE: HCPCS | Performed by: INTERNAL MEDICINE

## 2020-09-04 PROCEDURE — A9270 NON-COVERED ITEM OR SERVICE: HCPCS | Performed by: HOSPITALIST

## 2020-09-04 PROCEDURE — 700102 HCHG RX REV CODE 250 W/ 637 OVERRIDE(OP): Performed by: INTERNAL MEDICINE

## 2020-09-04 PROCEDURE — 700105 HCHG RX REV CODE 258: Performed by: INTERNAL MEDICINE

## 2020-09-04 PROCEDURE — A9270 NON-COVERED ITEM OR SERVICE: HCPCS | Performed by: NURSE PRACTITIONER

## 2020-09-04 PROCEDURE — 99225 PR SUBSEQUENT OBSERVATION CARE,LEVEL II: CPT | Performed by: HOSPITALIST

## 2020-09-04 PROCEDURE — 96376 TX/PRO/DX INJ SAME DRUG ADON: CPT

## 2020-09-04 PROCEDURE — 700102 HCHG RX REV CODE 250 W/ 637 OVERRIDE(OP): Performed by: NURSE PRACTITIONER

## 2020-09-04 RX ORDER — FAMOTIDINE 20 MG/1
20 TABLET, FILM COATED ORAL 2 TIMES DAILY
Status: DISCONTINUED | OUTPATIENT
Start: 2020-09-04 | End: 2020-09-05 | Stop reason: HOSPADM

## 2020-09-04 RX ADMIN — PROCHLORPERAZINE EDISYLATE 10 MG: 5 INJECTION INTRAMUSCULAR; INTRAVENOUS at 20:02

## 2020-09-04 RX ADMIN — SODIUM CHLORIDE: 9 INJECTION, SOLUTION INTRAVENOUS at 21:53

## 2020-09-04 RX ADMIN — SODIUM CHLORIDE: 9 INJECTION, SOLUTION INTRAVENOUS at 10:21

## 2020-09-04 RX ADMIN — DOCUSATE SODIUM 50 MG AND SENNOSIDES 8.6 MG 2 TABLET: 8.6; 5 TABLET, FILM COATED ORAL at 18:43

## 2020-09-04 RX ADMIN — FAMOTIDINE 20 MG: 20 TABLET, FILM COATED ORAL at 18:43

## 2020-09-04 RX ADMIN — ONDANSETRON 4 MG: 2 INJECTION INTRAMUSCULAR; INTRAVENOUS at 10:27

## 2020-09-04 RX ADMIN — ONDANSETRON 4 MG: 2 INJECTION INTRAMUSCULAR; INTRAVENOUS at 15:53

## 2020-09-04 RX ADMIN — ANTACID TABLETS 500 MG: 500 TABLET, CHEWABLE ORAL at 18:43

## 2020-09-04 RX ADMIN — MAGNESIUM HYDROXIDE 30 ML: 400 SUSPENSION ORAL at 18:44

## 2020-09-04 RX ADMIN — Medication 25 MG: at 15:52

## 2020-09-04 RX ADMIN — ONDANSETRON 4 MG: 2 INJECTION INTRAMUSCULAR; INTRAVENOUS at 05:37

## 2020-09-04 ASSESSMENT — ENCOUNTER SYMPTOMS
FEVER: 0
CHILLS: 0
COUGH: 0
HEADACHES: 0
CONSTIPATION: 0
PND: 0
BRUISES/BLEEDS EASILY: 0
DIARRHEA: 0
PALPITATIONS: 0
WHEEZING: 0
NAUSEA: 1
ABDOMINAL PAIN: 0
VOMITING: 1
MYALGIAS: 0
HEMOPTYSIS: 0
HEARTBURN: 0
CLAUDICATION: 0
DEPRESSION: 0
DOUBLE VISION: 0
DIZZINESS: 0
BLURRED VISION: 0
BACK PAIN: 0

## 2020-09-04 ASSESSMENT — PAIN DESCRIPTION - PAIN TYPE
TYPE: ACUTE PAIN
TYPE: ACUTE PAIN

## 2020-09-04 NOTE — ASSESSMENT & PLAN NOTE
She presented with complaint of severe symptoms of nausea and vomiting.  This is her fourth pregnancy and she reported she had bladder complains of nausea and vomiting but this is very severe in intensity.  She has been having difficulty maintaining hydration.  Admit to the hospital and start her on IV fluid for hydration.  Started on symptomatic management for nausea and vomiting.  She also reported constipation and started her on bowel protocol.    Continue supportive treatment.

## 2020-09-04 NOTE — ASSESSMENT & PLAN NOTE
She found to have hyponatremia most likely secondary to hypovolemic hyponatremia due to symptoms of nausea and vomiting.  Started on IV fluid for hydration.  Ordered lab work-up for tomorrow morning per  She also found to have low bicarb most likely secondary to dehydration and vomiting.    Improved with ivf.

## 2020-09-04 NOTE — CARE PLAN
Problem: Communication  Goal: The ability to communicate needs accurately and effectively will improve  Outcome: PROGRESSING AS EXPECTED     Problem: Safety  Goal: Will remain free from injury  Outcome: PROGRESSING AS EXPECTED  Goal: Will remain free from falls  Outcome: PROGRESSING AS EXPECTED     Problem: Pain Management  Goal: Pain level will decrease to patient's comfort goal  Outcome: PROGRESSING AS EXPECTED     Problem: Infection  Goal: Will remain free from infection  Outcome: PROGRESSING AS EXPECTED     Problem: Venous Thromboembolism (VTW)/Deep Vein Thrombosis (DVT) Prevention:  Goal: Patient will participate in Venous Thrombosis (VTE)/Deep Vein Thrombosis (DVT)Prevention Measures  Outcome: PROGRESSING AS EXPECTED     Problem: Psychosocial Needs:  Goal: Level of anxiety will decrease  Outcome: PROGRESSING AS EXPECTED     Problem: Fluid Volume:  Goal: Will maintain balanced intake and output  Outcome: PROGRESSING AS EXPECTED     Problem: Respiratory:  Goal: Respiratory status will improve  Outcome: PROGRESSING AS EXPECTED     Problem: Bowel/Gastric:  Goal: Normal bowel function is maintained or improved  Outcome: PROGRESSING AS EXPECTED  Goal: Will not experience complications related to bowel motility  Outcome: PROGRESSING AS EXPECTED

## 2020-09-04 NOTE — ED PROVIDER NOTES
"ED Provider Note    CHIEF COMPLAINT  Chief Complaint   Patient presents with   • Chest Pain     onset today, \"I dont' remember when\", \"it feels weird like my heart is racing and hurts\"   • Weakness     since discharge on    • N/V     since dicharge on ; reports not keeping fluids down; reports urinated x 2 today; last BM over 1 week ago   • Constipation   • Pregnancy     pt reports approx 6 weeks pregnant, , , will establish with GYN on 20       HPI  Luba Lind is a 30 y.o. female who presents nauseated, has vomited 7 times today.  She is 6 weeks pregnant, recently admitted to the hospital for hyperemesis gravidarum.  She states 3 separate oral antiemetic medications at home have not helped.  Since being discharged approximately 4 to 5 days ago, she was persistently nauseous but at times able to keep food down.  She was vomiting approximately twice a day until over the past 24 hours when things worsen.  She reports on 2 small urinations over the past 24 hours, states she feels dehydrated.  She has general malaise and feels lightheaded.  No vaginal bleeding.  After vomiting multiple times a day, she had discomfort in her chest, stated she felt like her heart was racing.  No syncope.  Patient states she has felt this way previously on her other 3 pregnancies.    REVIEW OF SYSTEMS  Constitutional: General malaise  Respiratory: No shortness of breath  Cardiac: No history of cardiac illness.  Gastrointestinal: Nausea and vomiting  Musculoskeletal: No back pain  Neurologic: No headache  Genitourinary: No dysuria       All other systems are negative.     PAST MEDICAL HISTORY  Past Medical History:   Diagnosis Date   • Fetal pyelectasis and EIC. negative maternal quad screen. declined amniocentesis 2015   • Headache(784.0)     before and after pregnancy.   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 "       FAMILY HISTORY  Family History   Problem Relation Age of Onset   • Arthritis Maternal Grandmother    • Cancer Maternal Grandmother    • Other Mother         NF1   • Migraines Mother        SOCIAL HISTORY  Social History     Socioeconomic History   • Marital status: Single     Spouse name: Not on file   • Number of children: Not on file   • Years of education: Not on file   • Highest education level: Not on file   Occupational History   • Not on file   Social Needs   • Financial resource strain: Not on file   • Food insecurity     Worry: Patient refused     Inability: Patient refused   • Transportation needs     Medical: Patient refused     Non-medical: Patient refused   Tobacco Use   • Smoking status: Former Smoker     Packs/day: 0.50     Years: 0.50     Pack years: 0.25     Types: Cigarettes     Quit date: 2012     Years since quittin.6   • Smokeless tobacco: Former User     Quit date: 2012   • Tobacco comment: 1 cig every week or 2 weeks.   Substance and Sexual Activity   • Alcohol use: Not Currently     Comment: not presently   • Drug use: Not Currently     Types: Marijuana, Inhaled     Comment: Last smoked Marijuana    • Sexual activity: Yes     Partners: Male   Lifestyle   • Physical activity     Days per week: Not on file     Minutes per session: Not on file   • Stress: Not on file   Relationships   • Social connections     Talks on phone: Not on file     Gets together: Not on file     Attends Gnosticist service: Not on file     Active member of club or organization: Not on file     Attends meetings of clubs or organizations: Not on file     Relationship status: Not on file   • Intimate partner violence     Fear of current or ex partner: Not on file     Emotionally abused: Not on file     Physically abused: Not on file     Forced sexual activity: Not on file   Other Topics Concern   • Not on file   Social History Narrative   • Not on file       SURGICAL HISTORY  Past Surgical History:    Procedure Laterality Date   • OTHER      14 y/o endoscopy; GI bleed resulting       CURRENT MEDICATIONS  Home Medications    **Home medications have not yet been reviewed for this encounter**         ALLERGIES  No Known Allergies    PHYSICAL EXAM  VITAL SIGNS: /86   Pulse 91   Temp 37.8 °C (100 °F) (Temporal)   Resp 17   Ht 1.524 m (5')   Wt 64.4 kg (142 lb)   LMP 07/23/2020   SpO2 97%   BMI 27.73 kg/m²   Constitutional: Well-nourished  ENT: Nares clear, mucous membranes dry.  Eyes:  Conjunctiva normal, No discharge.    Lymphatic: No adenopathy.   Cardiovascular: Normal heart rate, Normal rhythm.   Pulmonary: No wheezing, no rales  Gastrointestinal: Soft, nontender, no guarding  Skin: Warm, Dry, No jaundice.   Musculoskeletal:  No CVA tenderness.   Neurologic:  Normal motor and sensory function, No focal deficits noted.   Psychiatric: Depressed mood    RADIOLOGY/PROCEDURES/Labs  Results for orders placed or performed during the hospital encounter of 09/03/20   CBC with Differential   Result Value Ref Range    WBC 8.0 4.8 - 10.8 K/uL    RBC 5.60 (H) 4.20 - 5.40 M/uL    Hemoglobin 14.4 12.0 - 16.0 g/dL    Hematocrit 42.3 37.0 - 47.0 %    MCV 75.5 (L) 81.4 - 97.8 fL    MCH 25.7 (L) 27.0 - 33.0 pg    MCHC 34.0 33.6 - 35.0 g/dL    RDW 42.0 35.9 - 50.0 fL    Platelet Count 233 164 - 446 K/uL    MPV 10.6 9.0 - 12.9 fL    Neutrophils-Polys 77.50 (H) 44.00 - 72.00 %    Lymphocytes 14.40 (L) 22.00 - 41.00 %    Monocytes 7.20 0.00 - 13.40 %    Eosinophils 0.10 0.00 - 6.90 %    Basophils 0.50 0.00 - 1.80 %    Immature Granulocytes 0.30 0.00 - 0.90 %    Nucleated RBC 0.00 /100 WBC    Neutrophils (Absolute) 6.18 2.00 - 7.15 K/uL    Lymphs (Absolute) 1.15 1.00 - 4.80 K/uL    Monos (Absolute) 0.57 0.00 - 0.85 K/uL    Eos (Absolute) 0.01 0.00 - 0.51 K/uL    Baso (Absolute) 0.04 0.00 - 0.12 K/uL    Immature Granulocytes (abs) 0.02 0.00 - 0.11 K/uL    NRBC (Absolute) 0.00 K/uL   Complete Metabolic Panel (CMP)   Result  Value Ref Range    Sodium 132 (L) 135 - 145 mmol/L    Potassium 3.8 3.6 - 5.5 mmol/L    Chloride 97 96 - 112 mmol/L    Co2 15 (L) 20 - 33 mmol/L    Anion Gap 20.0 (H) 7.0 - 16.0    Glucose 84 65 - 99 mg/dL    Bun 12 8 - 22 mg/dL    Creatinine 0.36 (L) 0.50 - 1.40 mg/dL    Calcium 10.5 8.5 - 10.5 mg/dL    AST(SGOT) 9 (L) 12 - 45 U/L    ALT(SGPT) 8 2 - 50 U/L    Alkaline Phosphatase 57 30 - 99 U/L    Total Bilirubin 0.7 0.1 - 1.5 mg/dL    Albumin 4.7 3.2 - 4.9 g/dL    Total Protein 7.6 6.0 - 8.2 g/dL    Globulin 2.9 1.9 - 3.5 g/dL    A-G Ratio 1.6 g/dL   Troponin   Result Value Ref Range    Troponin T <6 6 - 19 ng/L   ESTIMATED GFR   Result Value Ref Range    GFR If African American >60 >60 mL/min/1.73 m 2    GFR If Non African American >60 >60 mL/min/1.73 m 2   EKG (NOW)   Result Value Ref Range    Report       [facility]    Test Date:  2020  Pt Name:    SURINDER SETHI                 Department: ER  MRN:        [id number]                      Room:  Gender:     Female                       Technician: [id number]  :        1990                   Requested By:ER TRIAGE PROTOCOL  Order #:    [id number]                    Reading MD: MELO WOOD MD    Measurements  Intervals                                Axis  Rate:       99                           P:          70  HI:         136                          QRS:        83  QRSD:       72                           T:          56  QT:         328  QTc:        421    Interpretive Statements  SINUS RHYTHM  NICOLE, CONSIDER BIATRIAL ABNORMALITIES  No previous ECG available for comparison  No acute ischemia or arrhythmia  Electronically Signed On 9-3-2020 21:09:05 PDT by MELO WOOD MD           COURSE & MEDICAL DECISION MAKING  Pertinent Labs & Imaging studies reviewed. (See chart for details)  Patient with recurrent nausea and vomiting from previous, she now appears to be dehydrated both on physical exam as well with  laboratory evaluation showing elevated BUN to creatinine ratio and depressed CO2.  Patient received IV fluids, Compazine for nausea as she stated this worked best last time she was here.  Patient remains ill-appearing, plan for hospitalization, ongoing hydration and treatment.  Suspect chest pain to be related to esophagitis secondary to multiple episodes of vomiting.  Her EKG was negative.    FINAL IMPRESSION  1. Hyperemesis gravidarum     2. Dehydration     3. Chest pain, unspecified type     4. Intractable vomiting with nausea, unspecified vomiting type             Electronically signed by: Richie Burk M.D., 9/3/2020 7:47 PM

## 2020-09-04 NOTE — PROGRESS NOTES
Assessment completed. Pt A&Ox 4. Respirations are even and unlabored on room air. Pt reports pain at this time.  VS stable, call light and belongings within reach. POC updated (Management of symptoms). Pt educated on room and call light, pt verbalized understanding. Communication board updated. Needs met.

## 2020-09-04 NOTE — H&P
"Hospital Medicine History & Physical Note    Date of Service  9/3/2020    Primary Care Physician  GERI Kay.    Consultants  None    Code Status  Full Code    Chief Complaint  Chief Complaint   Patient presents with   • Chest Pain     onset today, \"I dont' remember when\", \"it feels weird like my heart is racing and hurts\"   • Weakness     since discharge on    • N/V     since dicharge on ; reports not keeping fluids down; reports urinated x 2 today; last BM over 1 week ago   • Constipation   • Pregnancy     pt reports approx 6 weeks pregnant, LMP , , will establish with GYN on 20     History of Presenting Illness    30 y.o. female with no significant history of chronic medical condition who presented to the hospital on 9/3/2020 with complaint of nausea and vomiting for last few days.  She is pregnant and she recently discharged from the hospital with similar complaint.  She expressed that she is unable to keep anything down and she has been having multiple episode of nausea and vomiting since her discharge from the hospital.  She reported she has constipation and denies any blood in her vomiting.  There is no specific aggravating or alleviating factors.  She reported associated symptoms of mild abdominal pain.  She denies any other acute complaints.    I discussed about this admission with ER physician Dr. Burk.    Review of Systems  Review of Systems   Constitutional: Negative for chills, fever and weight loss.   HENT: Negative for hearing loss and tinnitus.    Eyes: Negative for blurred vision, double vision, photophobia and pain.   Respiratory: Negative for cough, sputum production and shortness of breath.    Cardiovascular: Negative for chest pain, palpitations, orthopnea and leg swelling.   Gastrointestinal: Positive for abdominal pain, constipation, nausea and vomiting. Negative for blood in stool and diarrhea.   Genitourinary: Negative for dysuria, frequency and " urgency.   Musculoskeletal: Negative for back pain, joint pain, myalgias and neck pain.   Skin: Negative for rash.   Neurological: Negative for dizziness, tingling, tremors, sensory change, speech change, focal weakness and headaches.   Psychiatric/Behavioral: Negative for hallucinations and substance abuse.   All other systems reviewed and are negative.      Past Medical History   has a past medical history of Fetal pyelectasis and EIC. negative maternal quad screen. declined amniocentesis (12/23/2015), Headache(784.0), Hyperemesis affecting pregnancy, antepartum (8/30/2020), Migraine, and NF2 (neurofibromatosis 2) ([facility]).    Surgical History  I reviewed surgical history with patient and she reported history of upper GI endoscopy and denies any surgical procedure    Family History  family history includes Arthritis in her maternal grandmother; Cancer in her maternal grandmother; Migraines in her mother; Other in her mother.     Social History   reports that she quit smoking about 8 years ago. Her smoking use included cigarettes. She has a 0.25 pack-year smoking history. She quit smokeless tobacco use about 8 years ago. She reports previous alcohol use. She reports previous drug use. Drugs: Marijuana and Inhaled.    Allergies  No Known Allergies    Medications  Prior to Admission Medications   Prescriptions Last Dose Informant Patient Reported? Taking?   Doxylamine-Pyridoxine 10-10 MG Tablet Delayed Response delayed-release tablet   No No   Sig: Take 20 mg by mouth every bedtime. Two tablets at bedtime on day 1 and 2; if symptoms persist, take 1 tablet in morning and 2 tablets at bedtime on day 3; if symptoms persist, may increase to 1 tablet in morning, 1 tablet mid-afternoon, and 2 tablets at bedtime on day 4   Prenatal Vit-Fe Fumarate-FA (PRENATAL VITAMIN PO)   Yes No   Sig: Take 1 Tab by mouth every day.   acetaminophen (TYLENOL) 325 MG Tab   No No   Sig: Take 2 Tabs by mouth every 6 hours as needed (Mild Pain;  (Pain scale 1-3); Temp greater than 100.5 F).   ondansetron (ZOFRAN ODT) 4 MG TABLET DISPERSIBLE   No No   Sig: Take 1 Tab by mouth every four hours as needed for Nausea (give PO if no IV route available).   prochlorperazine (COMPAZINE) 5 MG Tab   No No   Sig: Take 1 Tab by mouth every 6 hours as needed for Nausea/Vomiting.      Facility-Administered Medications: None       Physical Exam  Temp:  [37.8 °C (100 °F)] 37.8 °C (100 °F)  Pulse:  [] 94  Resp:  [17-18] 17  BP: ()/(56-87) 97/56  SpO2:  [95 %-98 %] 98 %    Physical Exam  Vitals signs reviewed.   Constitutional:       General: She is not in acute distress.     Appearance: Normal appearance. She is not ill-appearing.   HENT:      Head: Normocephalic and atraumatic.      Nose: No congestion.   Eyes:      General:         Right eye: No discharge.         Left eye: No discharge.      Pupils: Pupils are equal, round, and reactive to light.   Neck:      Musculoskeletal: Normal range of motion. No neck rigidity.   Cardiovascular:      Rate and Rhythm: Normal rate and regular rhythm.      Pulses: Normal pulses.      Heart sounds: Normal heart sounds. No murmur.   Pulmonary:      Effort: Pulmonary effort is normal. No respiratory distress.      Breath sounds: Normal breath sounds. No stridor.   Abdominal:      General: Bowel sounds are normal. There is no distension.      Palpations: Abdomen is soft.      Tenderness: There is abdominal tenderness (Mild epigastric area).      Comments: No signs of acute abdomen on physical exam.   Musculoskeletal: Normal range of motion.         General: No swelling or tenderness.   Skin:     General: Skin is warm.      Capillary Refill: Capillary refill takes less than 2 seconds.      Coloration: Skin is not jaundiced or pale.      Findings: No bruising.   Neurological:      General: No focal deficit present.      Mental Status: She is alert and oriented to person, place, and time.      Cranial Nerves: No cranial nerve  deficit.   Psychiatric:         Mood and Affect: Mood normal.         Behavior: Behavior normal.         Laboratory:  Recent Labs     09/03/20  1754   WBC 8.0   RBC 5.60*   HEMOGLOBIN 14.4   HEMATOCRIT 42.3   MCV 75.5*   MCH 25.7*   MCHC 34.0   RDW 42.0   PLATELETCT 233   MPV 10.6     Recent Labs     09/03/20  1754   SODIUM 132*   POTASSIUM 3.8   CHLORIDE 97   CO2 15*   GLUCOSE 84   BUN 12   CREATININE 0.36*   CALCIUM 10.5     Recent Labs     09/03/20  1754   ALTSGPT 8   ASTSGOT 9*   ALKPHOSPHAT 57   TBILIRUBIN 0.7   GLUCOSE 84         No results for input(s): NTPROBNP in the last 72 hours.      Recent Labs     09/03/20  1754   TROPONINT <6       Imaging:  No orders to display         Assessment/Plan:  I anticipate this patient is appropriate for observation status at this time.    Hyperemesis gravidarum  Assessment & Plan  She presented with complaint of severe symptoms of nausea and vomiting.  This is her fourth pregnancy and she reported she had bladder complains of nausea and vomiting but this is very severe in intensity.  She has been having difficulty maintaining hydration.  Admit to the hospital and start her on IV fluid for hydration.  Started on symptomatic management for nausea and vomiting.  She also reported constipation and started her on bowel protocol.        Hyponatremia  Assessment & Plan  She found to have hyponatremia most likely secondary to hypovolemic hyponatremia due to symptoms of nausea and vomiting.  Started on IV fluid for hydration.  Ordered lab work-up for tomorrow morning per  She also found to have low bicarb most likely secondary to dehydration and vomiting.    I reviewed discharge summary from August 30, 2020 patient discharged.  Dr. Mclean

## 2020-09-04 NOTE — CARE PLAN
Problem: Communication  Goal: The ability to communicate needs accurately and effectively will improve  Outcome: PROGRESSING AS EXPECTED     Problem: Safety  Goal: Will remain free from injury  Outcome: PROGRESSING AS EXPECTED     Problem: Fluid Volume:  Goal: Will maintain balanced intake and output  Outcome: PROGRESSING AS EXPECTED     Problem: Bowel/Gastric:  Goal: Normal bowel function is maintained or improved  Outcome: PROGRESSING SLOWER THAN EXPECTED  Flowsheets (Taken 9/4/2020 0800)  Last BM: 08/28/20 (Pended)  Note: Pt reports LBM x1 week ago; medicated per MAR.

## 2020-09-04 NOTE — ED NOTES
Pharmacy Medication Reconciliation      Medication reconciliation updated and complete per pt at bedside  Allergies have been verified  No oral ABX within the last 14 days  Pt home pharmacy:[facility]

## 2020-09-04 NOTE — PROGRESS NOTES
Assumed pt care at 0700. Received report from Nick ROMO RN. A&O x4. Pt denies pain. Pt reports nausea; medicated per MAR. Respirations even and unlabored on room air. Pt reports LBM x1 week ago; medicated per MAR.   Updated on POC, communication board updated. Bed locked and in lowest position. Call light and belongings within reach. Non-skid socks in place. Needs met, will continue to monitor.

## 2020-09-05 VITALS
HEART RATE: 77 BPM | DIASTOLIC BLOOD PRESSURE: 61 MMHG | WEIGHT: 125.44 LBS | OXYGEN SATURATION: 97 % | BODY MASS INDEX: 24.63 KG/M2 | SYSTOLIC BLOOD PRESSURE: 103 MMHG | HEIGHT: 60 IN | TEMPERATURE: 97.3 F | RESPIRATION RATE: 18 BRPM

## 2020-09-05 PROBLEM — O21.0 HYPEREMESIS GRAVIDARUM: Status: RESOLVED | Noted: 2020-08-30 | Resolved: 2020-09-05

## 2020-09-05 LAB
ANION GAP SERPL CALC-SCNC: 11 MMOL/L (ref 7–16)
BUN SERPL-MCNC: 6 MG/DL (ref 8–22)
CALCIUM SERPL-MCNC: 8.9 MG/DL (ref 8.5–10.5)
CHLORIDE SERPL-SCNC: 104 MMOL/L (ref 96–112)
CO2 SERPL-SCNC: 18 MMOL/L (ref 20–33)
CREAT SERPL-MCNC: 0.35 MG/DL (ref 0.5–1.4)
GLUCOSE SERPL-MCNC: 100 MG/DL (ref 65–99)
MAGNESIUM SERPL-MCNC: 1.9 MG/DL (ref 1.5–2.5)
PHOSPHATE SERPL-MCNC: 2.4 MG/DL (ref 2.5–4.5)
POTASSIUM SERPL-SCNC: 3.7 MMOL/L (ref 3.6–5.5)
SODIUM SERPL-SCNC: 133 MMOL/L (ref 135–145)

## 2020-09-05 PROCEDURE — 83735 ASSAY OF MAGNESIUM: CPT

## 2020-09-05 PROCEDURE — 700102 HCHG RX REV CODE 250 W/ 637 OVERRIDE(OP): Performed by: HOSPITALIST

## 2020-09-05 PROCEDURE — 84100 ASSAY OF PHOSPHORUS: CPT

## 2020-09-05 PROCEDURE — A9270 NON-COVERED ITEM OR SERVICE: HCPCS | Performed by: INTERNAL MEDICINE

## 2020-09-05 PROCEDURE — G0378 HOSPITAL OBSERVATION PER HR: HCPCS

## 2020-09-05 PROCEDURE — 700102 HCHG RX REV CODE 250 W/ 637 OVERRIDE(OP): Performed by: INTERNAL MEDICINE

## 2020-09-05 PROCEDURE — A9270 NON-COVERED ITEM OR SERVICE: HCPCS | Performed by: HOSPITALIST

## 2020-09-05 PROCEDURE — 80048 BASIC METABOLIC PNL TOTAL CA: CPT

## 2020-09-05 PROCEDURE — 99217 PR OBSERVATION CARE DISCHARGE: CPT | Performed by: HOSPITALIST

## 2020-09-05 PROCEDURE — 96376 TX/PRO/DX INJ SAME DRUG ADON: CPT

## 2020-09-05 PROCEDURE — 700111 HCHG RX REV CODE 636 W/ 250 OVERRIDE (IP): Performed by: INTERNAL MEDICINE

## 2020-09-05 RX ORDER — PYRIDOXINE HCL (VITAMIN B6) 25 MG
25 TABLET ORAL DAILY
Qty: 30 TAB | Refills: 0 | Status: SHIPPED | OUTPATIENT
Start: 2020-09-05 | End: 2020-09-14

## 2020-09-05 RX ORDER — ONDANSETRON 4 MG/1
4 TABLET, ORALLY DISINTEGRATING ORAL EVERY 6 HOURS PRN
Qty: 15 TAB | Refills: 0 | Status: SHIPPED | OUTPATIENT
Start: 2020-09-05 | End: 2020-09-14

## 2020-09-05 RX ORDER — VITAMIN A ACETATE, BETA CAROTENE, ASCORBIC ACID, CHOLECALCIFEROL, .ALPHA.-TOCOPHEROL ACETATE, DL-, THIAMINE MONONITRATE, RIBOFLAVIN, NIACINAMIDE, PYRIDOXINE HYDROCHLORIDE, FOLIC ACID, CYANOCOBALAMIN, CALCIUM CARBONATE, FERROUS FUMARATE, ZINC OXIDE, CUPRIC OXIDE 3080; 12; 120; 400; 1; 1.84; 3; 20; 22; 920; 25; 200; 27; 10; 2 [IU]/1; UG/1; MG/1; [IU]/1; MG/1; MG/1; MG/1; MG/1; MG/1; [IU]/1; MG/1; MG/1; MG/1; MG/1; MG/1
1 TABLET, FILM COATED ORAL DAILY
Qty: 30 TAB | Refills: 0 | Status: SHIPPED | OUTPATIENT
Start: 2020-09-06 | End: 2020-09-28

## 2020-09-05 RX ORDER — CALCIUM CARBONATE 500 MG/1
500 TABLET, CHEWABLE ORAL 2 TIMES DAILY PRN
Qty: 30 TAB | Refills: 0 | Status: SHIPPED | OUTPATIENT
Start: 2020-09-05 | End: 2020-09-28

## 2020-09-05 RX ADMIN — DOCUSATE SODIUM 50 MG AND SENNOSIDES 8.6 MG 2 TABLET: 8.6; 5 TABLET, FILM COATED ORAL at 05:43

## 2020-09-05 RX ADMIN — Medication 25 MG: at 05:43

## 2020-09-05 RX ADMIN — PRENATAL WITH FERROUS FUM AND FOLIC ACID 1 TABLET: 3080; 920; 120; 400; 22; 1.84; 3; 20; 10; 1; 12; 200; 27; 25; 2 TABLET ORAL at 05:43

## 2020-09-05 RX ADMIN — DIBASIC SODIUM PHOSPHATE, MONOBASIC POTASSIUM PHOSPHATE AND MONOBASIC SODIUM PHOSPHATE 250 MG: 852; 155; 130 TABLET ORAL at 11:32

## 2020-09-05 RX ADMIN — PROCHLORPERAZINE EDISYLATE 10 MG: 5 INJECTION INTRAMUSCULAR; INTRAVENOUS at 05:45

## 2020-09-05 RX ADMIN — FAMOTIDINE 20 MG: 20 TABLET, FILM COATED ORAL at 05:43

## 2020-09-05 ASSESSMENT — PAIN DESCRIPTION - PAIN TYPE: TYPE: ACUTE PAIN

## 2020-09-05 NOTE — DISCHARGE INSTRUCTIONS
Discharge Instructions    Discharged to home by car with relative. Discharged via wheelchair, hospital escort: Yes.  Special equipment needed: Not Applicable    Be sure to schedule a follow-up appointment with your primary care doctor or any specialists as instructed.     Discharge Plan:   Diet Plan: Discussed  Activity Level: Discussed  Confirmed Follow up Appointment: Patient to Call and Schedule Appointment  Confirmed Symptoms Management: Discussed  Medication Reconciliation Updated: Yes    I understand that a diet low in cholesterol, fat, and sodium is recommended for good health. Unless I have been given specific instructions below for another diet, I accept this instruction as my diet prescription.   Other diet:     Special Instructions: None    · Is patient discharged on Warfarin / Coumadin?   No Eating Plan for Hyperemesis Gravidarum  Severe cases of hyperemesis gravidarum can lead to dehydration and malnutrition. The hyperemesis eating plan is one way to lessen the symptoms of nausea and vomiting. It is often used with prescribed medicines to control your symptoms.   WHAT CAN I DO TO RELIEVE MY SYMPTOMS?  Listen to your body. Everyone is different and has different preferences. Find what works best for you. Some of the following things may help:  · Eat and drink slowly.  · Eat 5-6 small meals daily instead of 3 large meals.    · Eat crackers before you get out of bed in the morning.    · Starchy foods are usually well tolerated (such as cereal, toast, bread, potatoes, pasta, rice, and pretzels).    · Shavonne may help with nausea. Add ¼ tsp ground shavonne to hot tea or choose shavonne tea.    · Try drinking 100% fruit juice or an electrolyte drink.  · Continue to take your prenatal vitamins as directed by your health care provider. If you are having trouble taking your prenatal vitamins, talk with your health care provider about different options.  · Include at least 1 serving of protein with your meals and  snacks (such as meats or poultry, beans, nuts, eggs, or yogurt). Try eating a protein-rich snack before bed (such as cheese and crackers or a half turkey or peanut butter sandwich).  WHAT THINGS SHOULD I AVOID TO REDUCE MY SYMPTOMS?  The following things may help reduce your symptoms:  · Avoid foods with strong smells. Try eating meals in well-ventilated areas that are free of odors.  · Avoid drinking water or other beverages with meals. Try not to drink anything less than 30 minutes before and after meals.  · Avoid drinking more than 1 cup of fluid at a time.  · Avoid fried or high-fat foods, such as butter and cream sauces.  · Avoid spicy foods.  · Avoid skipping meals the best you can. Nausea can be more intense on an empty stomach. If you cannot tolerate food at that time, do not force it. Try sucking on ice chips or other frozen items and make up the calories later.  · Avoid lying down within 2 hours after eating.     This information is not intended to replace advice given to you by your health care provider. Make sure you discuss any questions you have with your health care provider.     Document Released: 10/14/2008 Document Revised: 12/23/2014 Document Reviewed: 10/22/2014  [employer] Interactive Patient Education ©2016 [employer]      Depression / Suicide Risk    As you are discharged from this [facility] facility, it is important to learn how to keep safe from harming yourself.    Recognize the warning signs:  · Abrupt changes in personality, positive or negative- including increase in energy   · Giving away possessions  · Change in eating patterns- significant weight changes-  positive or negative  · Change in sleeping patterns- unable to sleep or sleeping all the time   · Unwillingness or inability to communicate  · Depression  · Unusual sadness, discouragement and loneliness  · Talk of wanting to die  · Neglect of personal appearance   · Rebelliousness- reckless behavior  · Withdrawal from  people/activities they love  · Confusion- inability to concentrate     If you or a loved one observes any of these behaviors or has concerns about self-harm, here's what you can do:  · Talk about it- your feelings and reasons for harming yourself  · Remove any means that you might use to hurt yourself (examples: pills, rope, extension cords, firearm)  · Get professional help from the community (Mental Health, Substance Abuse, psychological counseling)  · Do not be alone:Call your Safe Contact- someone whom you trust who will be there for you.  · Call your local CRISIS HOTLINE 061-2638 or 245-171-5804  · Call your local Children's Mobile Crisis Response Team [place] (673) 788-4404 or www.MitoProd  · Call the toll free National Suicide Prevention Hotlines   · National Suicide Prevention Lifeline 315-566-SXRU (6103)  · National Hope Line Network 800-SUICIDE (609-6804)      Discharge Instructions per Nate Fine M.D.    Follow up with primary care in 1 week  Follow up with OB/GYN as scheduled    DIET: soft diet    ACTIVITY: as tolerated    DIAGNOSIS: nausea and vomiting    Return to ER if symptoms return, fever, abdominal pain, dizziness.

## 2020-09-05 NOTE — Clinical Note
Less nauseated,wants advance her diet as she feels more hungry,MD informed and advanced diet,pt refused suppository,awaiting pharmacy to verify Miralax.

## 2020-09-05 NOTE — PROGRESS NOTES
Pt in bed awake,a&ox4,ambulated,had shower,tolerates food,had MD osorio updated,possible d/c today.

## 2020-09-05 NOTE — PROGRESS NOTES
Assessment completed. Pt A&Ox 4. Respirations are even and unlabored on room air. Pt denies pain at this time. VS stable, call light and belongings within reach. POC updated (bowel movement protocol). Pt educated on room and call light, pt verbalized understanding. Communication board updated. Needs met.

## 2020-09-05 NOTE — DISCHARGE SUMMARY
"Discharge Summary    CHIEF COMPLAINT ON ADMISSION  Chief Complaint   Patient presents with   • Chest Pain     onset today, \"I dont' remember when\", \"it feels weird like my heart is racing and hurts\"   • Weakness     since discharge on    • N/V     since dicharge on ; reports not keeping fluids down; reports urinated x 2 today; last BM over 1 week ago   • Constipation   • Pregnancy     pt reports approx 6 weeks pregnant, LMP 7/18, , will establish with GYN on 20       Reason for Admission  Chest Pain, Weakness, 6 wks Pregna*     Admission Date  9/3/2020    CODE STATUS  Full code    HPI & HOSPITAL COURSE  Please see original H&P for specific information, patient history of 7 weeks pregnancy, and with nausea and vomiting and dehydration, patient recently discharged due to same symptoms patient responded well to supportive treatment, at this time patient continue having nausea and vomiting she was admitted for observation and started on supportive treatment again with IV fluids, antiacid medication, electrolyte replacement, patient gradually improved and she is able to tolerate soft diet, patient is recommended to continue soft diet for at least 1 week, continue Tums for antiacid, continue multivitamins, PRN antinausea medication, needs to keep good hydration, follow-up with her primary care physician and OB/GYN as scheduled, patient expressed understanding of her discharge plan and agree with it all questions have been answered.       Therefore, she is discharged in good and stable condition to home with close outpatient follow-up.      Discharge Date  2020    FOLLOW UP ITEMS POST DISCHARGE  Primary care physician  OB/GYN    DISCHARGE DIAGNOSES  Active Problems:    Hyponatremia POA: Unknown  Resolved Problems:    Hyperemesis gravidarum POA: Unknown      FOLLOW UP  Future Appointments   Date Time Provider Department Center   2020  9:30 AM Luis Escobedo M.D. [facility] " ALLEN [facility]el[facility]  [address]  [address]  [facility] [id number] [phone]      Per office request please call to schedule your hospital follow up with your primary care physician. Thank you     [facility], Emergency Dept  [address] [facility] [id number] [phone]    If symptoms worsen please come to emergency.      MEDICATIONS ON DISCHARGE     Medication List      START taking these medications      Instructions   calcium carbonate 500 MG Chew  Commonly known as: TUMS   Take 1 Tab by mouth 2 times a day as needed (Heartburn/Indigestion).  Dose: 500 mg     phosphorus 250 MG tablet  Commonly known as: K-Phos-Neutral   Take 1 Tab by mouth 2 Times a Day for 3 days.  Dose: 1 Tab     prenatal plus vitamin 27-1 MG Tabs tablet  Start taking on: September 6, 2020  Replaces: PRENATAL VITAMIN PO   Take 1 Tab by mouth every day.  Dose: 1 Tab     pyridoxine 25 MG Tabs  Commonly known as: VITAMIN B-6  Replaces: Doxylamine-Pyridoxine 10-10 MG Tbec delayed-release tablet   Take 1 Tab by mouth every day.  Dose: 25 mg        CONTINUE taking these medications      Instructions   ondansetron 4 MG Tbdp  Commonly known as: ZOFRAN ODT   Take 1 Tab by mouth every 6 hours as needed for Nausea.  Dose: 4 mg     prochlorperazine 5 MG Tabs  Commonly known as: COMPAZINE   Take 5 mg by mouth every 6 hours as needed for Nausea/Vomiting.  Dose: 5 mg        STOP taking these medications    Doxylamine-Pyridoxine 10-10 MG Tbec delayed-release tablet  Replaced by: pyridoxine 25 MG Tabs     PRENATAL VITAMIN PO  Replaced by: prenatal plus vitamin 27-1 MG Tabs tablet            Allergies  No Known Allergies    DIET  Soft diet    ACTIVITY  As tolerated.  Weight bearing as tolerated    CONSULTATIONS  None    PROCEDURES  None    LABORATORY  Lab Results   Component Value Date    SODIUM 133 (L) 09/05/2020    POTASSIUM 3.7 09/05/2020    CHLORIDE 104 09/05/2020    CO2 18 (L) 09/05/2020    GLUCOSE 100 (H) 09/05/2020    BUN 6  (L) 09/05/2020    CREATININE 0.35 (L) 09/05/2020        Lab Results   Component Value Date    WBC 6.0 09/04/2020    HEMOGLOBIN 12.1 09/04/2020    HEMATOCRIT 36.8 (L) 09/04/2020    PLATELETCT 189 09/04/2020        Total time of the discharge process exceeds 40 minutes.

## 2020-09-09 ENCOUNTER — GYNECOLOGY VISIT (OUTPATIENT)
Dept: OBGYN | Facility: CLINIC | Age: 30
End: 2020-09-09
Payer: MEDICAID

## 2020-09-09 VITALS — WEIGHT: 125 LBS | SYSTOLIC BLOOD PRESSURE: 104 MMHG | BODY MASS INDEX: 24.41 KG/M2 | DIASTOLIC BLOOD PRESSURE: 60 MMHG

## 2020-09-09 DIAGNOSIS — Q85.01 NEUROFIBROMATOSIS, TYPE 1 (VON RECKLINGHAUSEN'S DISEASE) (HCC): ICD-10-CM

## 2020-09-09 DIAGNOSIS — Z3A.01 LESS THAN 8 WEEKS GESTATION OF PREGNANCY: ICD-10-CM

## 2020-09-09 DIAGNOSIS — O21.0 HYPEREMESIS AFFECTING PREGNANCY, ANTEPARTUM: ICD-10-CM

## 2020-09-09 DIAGNOSIS — N93.8 DUB (DYSFUNCTIONAL UTERINE BLEEDING): ICD-10-CM

## 2020-09-09 LAB
INT CON NEG: NEGATIVE
INT CON POS: POSITIVE
POC URINE PREGNANCY TEST: POSITIVE

## 2020-09-09 PROCEDURE — 99203 OFFICE O/P NEW LOW 30 MIN: CPT | Performed by: OBSTETRICS & GYNECOLOGY

## 2020-09-09 PROCEDURE — 81025 URINE PREGNANCY TEST: CPT | Performed by: OBSTETRICS & GYNECOLOGY

## 2020-09-09 RX ORDER — OMEPRAZOLE 20 MG/1
20 CAPSULE, DELAYED RELEASE ORAL DAILY
Qty: 30 CAP | Refills: 2 | Status: SHIPPED | OUTPATIENT
Start: 2020-09-09 | End: 2020-09-28

## 2020-09-09 ASSESSMENT — FIBROSIS 4 INDEX: FIB4 SCORE: 0.66

## 2020-09-09 NOTE — NON-PROVIDER
Patient here for GYN/DUB.  UPT= Positive  LMP= 7/18/2020  JAZMYNE= 4/24/2020  GA=7w4d  Last pap unknown  Phone number: [phone]  Pharmacy verified  C/o Pt states being nauseous, headache and always tired. Also states not having any appetite, she also cant drink water without throwing up.

## 2020-09-09 NOTE — PROGRESS NOTES
Chief complaint: Amenorrhea    Luba Lind,  30 y.o.  female with Patient's last menstrual period was 2020. presents today with complaint of dysfunctional uterine bleeding.    Subjective : Patient presents to the office for absent menses.    Nausea/Vomiting: Yes .  Improved after starting Zofran, but still having significant nausea and occasional vomiting.  She still reports weight loss.  Abdominal /pelvic cramping: No  Vaginal bleeding: No  Positive home UPT yes  Other symptoms: Headache and weakness    Pertinent positives documented in HPI and all other systems reviewed & are negative    OB History    Para Term  AB Living   4 3 3     3   SAB TAB Ectopic Molar Multiple Live Births             3      # Outcome Date GA Lbr Anthony/2nd Weight Sex Delivery Anes PTL Lv   4 Current            3 Term 16 38w0d   F    JOSE      Birth Comments: Pt states no complications   2 Term 12 38w2d  3.345 kg (7 lb 6 oz) F Vag-Spont EPI N JOSE   1 Term 01/04/10 40w0d  3.969 kg (8 lb 12 oz) M Vag-Spont EPI  JOSE      Birth Comments: denies complications.       Past Gyn history: last pap unsure, hx STDs denies    Past Medical History:   Diagnosis Date   • Fetal pyelectasis and EIC. negative maternal quad screen. declined amniocentesis 2015   • Headache(784.0)     before and after pregnancy.   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       Past Surgical History:   Procedure Laterality Date   • OTHER      14 y/o endoscopy; GI bleed resulting       Meds: Zofran, Compazine, prenatal vitamins    Allergies: Patient has no known allergies.    Physical Exam:    /60   Wt 56.7 kg (125 lb)   LMP 2020   BMI 24.41 kg/m²   Gen: alert, in no apparent distress, anxious  HEENT: normal;   PERRLA, EOMI, sclera clear  Lungs: Clear to auscultation  Heart: RRR No M  Abd: abdomen is soft without significant tenderness,  masses, organomegaly or guarding  Pelvic: External genitalia normal  Ext: NT bilaterally, no cyanosis, clubbing or edema    Recent Results (from the past 336 hour(s))   PRENATAL PANEL 3-HIV-CULTURE    Collection Time: 08/29/20  7:44 PM   Result Value Ref Range    WBC 6.7 4.8 - 10.8 K/uL    RBC 4.53 4.20 - 5.40 M/uL    Hemoglobin 11.6 (L) 12.0 - 16.0 g/dL    Hematocrit 35.2 (L) 37.0 - 47.0 %    MCV 77.7 (L) 81.4 - 97.8 fL    MCH 25.6 (L) 27.0 - 33.0 pg    MCHC 33.0 (L) 33.6 - 35.0 g/dL    RDW 41.7 35.9 - 50.0 fL    Platelet Count 208 164 - 446 K/uL    MPV 10.5 9.0 - 12.9 fL    Neutrophils-Polys 73.80 (H) 44.00 - 72.00 %    Lymphocytes 17.50 (L) 22.00 - 41.00 %    Monocytes 7.70 0.00 - 13.40 %    Eosinophils 0.40 0.00 - 6.90 %    Basophils 0.30 0.00 - 1.80 %    Immature Granulocytes 0.30 0.00 - 0.90 %    Nucleated RBC 0.00 /100 WBC    Neutrophils (Absolute) 4.97 2.00 - 7.15 K/uL    Lymphs (Absolute) 1.18 1.00 - 4.80 K/uL    Monos (Absolute) 0.52 0.00 - 0.85 K/uL    Eos (Absolute) 0.03 0.00 - 0.51 K/uL    Baso (Absolute) 0.02 0.00 - 0.12 K/uL    Immature Granulocytes (abs) 0.02 0.00 - 0.11 K/uL    NRBC (Absolute) 0.00 K/uL    Rubella IgG Antibody 137.00 IU/mL    Hepatitis B Surface Antigen Non-Reactive Non-Reactive    Syphilis, Treponemal Qual Non-Reactive Non-Reactive   HIV AG/AB COMBO ASSAY SCREENING    Collection Time: 08/29/20  7:44 PM   Result Value Ref Range    HIV Ag/Ab Combo Assay Non-Reactive Non Reactive   OP Prenatal Panel-Blood Bank    Collection Time: 08/29/20  7:44 PM   Result Value Ref Range    ABO Grouping Only O     Rh Grouping Only NEG     Antibody Screen Scrn NEG    URINE CULTURE(NEW)    Collection Time: 08/29/20  9:19 PM    Specimen: Urine   Result Value Ref Range    Significant Indicator NEG     Source UR     Site -     Culture Result Mixed skin shira 10-50,000 cfu/mL    CBC with Differential    Collection Time: 08/30/20  6:20 AM   Result Value Ref Range    WBC 5.8 4.8 - 10.8 K/uL    RBC 4.23 4.20  - 5.40 M/uL    Hemoglobin 11.1 (L) 12.0 - 16.0 g/dL    Hematocrit 33.8 (L) 37.0 - 47.0 %    MCV 79.9 (L) 81.4 - 97.8 fL    MCH 26.2 (L) 27.0 - 33.0 pg    MCHC 32.8 (L) 33.6 - 35.0 g/dL    RDW 42.8 35.9 - 50.0 fL    Platelet Count 199 164 - 446 K/uL    MPV 10.9 9.0 - 12.9 fL    Neutrophils-Polys 65.20 44.00 - 72.00 %    Lymphocytes 22.50 22.00 - 41.00 %    Monocytes 10.30 0.00 - 13.40 %    Eosinophils 1.20 0.00 - 6.90 %    Basophils 0.50 0.00 - 1.80 %    Immature Granulocytes 0.30 0.00 - 0.90 %    Nucleated RBC 0.00 /100 WBC    Neutrophils (Absolute) 3.78 2.00 - 7.15 K/uL    Lymphs (Absolute) 1.31 1.00 - 4.80 K/uL    Monos (Absolute) 0.60 0.00 - 0.85 K/uL    Eos (Absolute) 0.07 0.00 - 0.51 K/uL    Baso (Absolute) 0.03 0.00 - 0.12 K/uL    Immature Granulocytes (abs) 0.02 0.00 - 0.11 K/uL    NRBC (Absolute) 0.00 K/uL   Basic Metabolic Panel (BMP)    Collection Time: 08/30/20  6:20 AM   Result Value Ref Range    Sodium 132 (L) 135 - 145 mmol/L    Potassium 3.3 (L) 3.6 - 5.5 mmol/L    Chloride 102 96 - 112 mmol/L    Co2 18 (L) 20 - 33 mmol/L    Glucose 100 (H) 65 - 99 mg/dL    Bun 7 (L) 8 - 22 mg/dL    Creatinine 0.41 (L) 0.50 - 1.40 mg/dL    Calcium 8.9 8.5 - 10.5 mg/dL    Anion Gap 12.0 7.0 - 16.0   ESTIMATED GFR    Collection Time: 08/30/20  6:20 AM   Result Value Ref Range    GFR If African American >60 >60 mL/min/1.73 m 2    GFR If Non African American >60 >60 mL/min/1.73 m 2   COVID/SARS CoV-2 PCR    Collection Time: 08/30/20  6:33 AM    Specimen: Nasopharyngeal; Respirate   Result Value Ref Range    COVID Order Status Received    SARS-CoV-2, PCR (In-House)    Collection Time: 08/30/20  6:33 AM   Result Value Ref Range    SARS-CoV-2 Source NP Swab     SARS-CoV-2 by PCR NotDetected    EKG (NOW)    Collection Time: 09/03/20  4:40 PM   Result Value Ref Range    Report       [facility] Emergency Dept.    Test Date:  2020-09-03  Pt Name:    SURINDER SETHI                 Department: ER  MRN:         [id number]                      Room:  Gender:     Female                       Technician: [id number]  :        [id number]                   Requested By:ER TRIAGE PROTOCOL  Order #:    [id number]                    Reading MD: MELO WOOD MD    Measurements  Intervals                                Axis  Rate:       99                           P:          70  MO:         136                          QRS:        83  QRSD:       72                           T:          56  QT:         328  QTc:        421    Interpretive Statements  SINUS RHYTHM  NICOLE, CONSIDER BIATRIAL ABNORMALITIES  No previous ECG available for comparison  No acute ischemia or arrhythmia  Electronically Signed On 9-3-2020 21:09:05 PDT by MELO WOOD MD     CBC with Differential    Collection Time: 20  5:54 PM   Result Value Ref Range    WBC 8.0 4.8 - 10.8 K/uL    RBC 5.60 (H) 4.20 - 5.40 M/uL    Hemoglobin 14.4 12.0 - 16.0 g/dL    Hematocrit 42.3 37.0 - 47.0 %    MCV 75.5 (L) 81.4 - 97.8 fL    MCH 25.7 (L) 27.0 - 33.0 pg    MCHC 34.0 33.6 - 35.0 g/dL    RDW 42.0 35.9 - 50.0 fL    Platelet Count 233 164 - 446 K/uL    MPV 10.6 9.0 - 12.9 fL    Neutrophils-Polys 77.50 (H) 44.00 - 72.00 %    Lymphocytes 14.40 (L) 22.00 - 41.00 %    Monocytes 7.20 0.00 - 13.40 %    Eosinophils 0.10 0.00 - 6.90 %    Basophils 0.50 0.00 - 1.80 %    Immature Granulocytes 0.30 0.00 - 0.90 %    Nucleated RBC 0.00 /100 WBC    Neutrophils (Absolute) 6.18 2.00 - 7.15 K/uL    Lymphs (Absolute) 1.15 1.00 - 4.80 K/uL    Monos (Absolute) 0.57 0.00 - 0.85 K/uL    Eos (Absolute) 0.01 0.00 - 0.51 K/uL    Baso (Absolute) 0.04 0.00 - 0.12 K/uL    Immature Granulocytes (abs) 0.02 0.00 - 0.11 K/uL    NRBC (Absolute) 0.00 K/uL   Complete Metabolic Panel (CMP)    Collection Time: 20  5:54 PM   Result Value Ref Range    Sodium 132 (L) 135 - 145 mmol/L    Potassium 3.8 3.6 - 5.5 mmol/L    Chloride 97 96 - 112 mmol/L    Co2 15 (L) 20 - 33 mmol/L    Anion Gap 20.0 (H) 7.0  - 16.0    Glucose 84 65 - 99 mg/dL    Bun 12 8 - 22 mg/dL    Creatinine 0.36 (L) 0.50 - 1.40 mg/dL    Calcium 10.5 8.5 - 10.5 mg/dL    AST(SGOT) 9 (L) 12 - 45 U/L    ALT(SGPT) 8 2 - 50 U/L    Alkaline Phosphatase 57 30 - 99 U/L    Total Bilirubin 0.7 0.1 - 1.5 mg/dL    Albumin 4.7 3.2 - 4.9 g/dL    Total Protein 7.6 6.0 - 8.2 g/dL    Globulin 2.9 1.9 - 3.5 g/dL    A-G Ratio 1.6 g/dL   Troponin    Collection Time: 09/03/20  5:54 PM   Result Value Ref Range    Troponin T <6 6 - 19 ng/L   ESTIMATED GFR    Collection Time: 09/03/20  5:54 PM   Result Value Ref Range    GFR If African American >60 >60 mL/min/1.73 m 2    GFR If Non African American >60 >60 mL/min/1.73 m 2   CBC with Differential    Collection Time: 09/04/20  5:41 AM   Result Value Ref Range    WBC 6.0 4.8 - 10.8 K/uL    RBC 4.69 4.20 - 5.40 M/uL    Hemoglobin 12.1 12.0 - 16.0 g/dL    Hematocrit 36.8 (L) 37.0 - 47.0 %    MCV 78.5 (L) 81.4 - 97.8 fL    MCH 25.8 (L) 27.0 - 33.0 pg    MCHC 32.9 (L) 33.6 - 35.0 g/dL    RDW 43.7 35.9 - 50.0 fL    Platelet Count 189 164 - 446 K/uL    MPV 10.8 9.0 - 12.9 fL    Neutrophils-Polys 73.30 (H) 44.00 - 72.00 %    Lymphocytes 18.50 (L) 22.00 - 41.00 %    Monocytes 7.00 0.00 - 13.40 %    Eosinophils 0.50 0.00 - 6.90 %    Basophils 0.50 0.00 - 1.80 %    Immature Granulocytes 0.20 0.00 - 0.90 %    Nucleated RBC 0.00 /100 WBC    Neutrophils (Absolute) 4.41 2.00 - 7.15 K/uL    Lymphs (Absolute) 1.11 1.00 - 4.80 K/uL    Monos (Absolute) 0.42 0.00 - 0.85 K/uL    Eos (Absolute) 0.03 0.00 - 0.51 K/uL    Baso (Absolute) 0.03 0.00 - 0.12 K/uL    Immature Granulocytes (abs) 0.01 0.00 - 0.11 K/uL    NRBC (Absolute) 0.00 K/uL   Comp Metabolic Panel (CMP)    Collection Time: 09/04/20  5:41 AM   Result Value Ref Range    Sodium 134 (L) 135 - 145 mmol/L    Potassium 3.7 3.6 - 5.5 mmol/L    Chloride 104 96 - 112 mmol/L    Co2 15 (L) 20 - 33 mmol/L    Anion Gap 15.0 7.0 - 16.0    Glucose 79 65 - 99 mg/dL    Bun 11 8 - 22 mg/dL    Creatinine  0.38 (L) 0.50 - 1.40 mg/dL    Calcium 9.2 8.5 - 10.5 mg/dL    AST(SGOT) 9 (L) 12 - 45 U/L    ALT(SGPT) 5 2 - 50 U/L    Alkaline Phosphatase 50 30 - 99 U/L    Total Bilirubin 0.6 0.1 - 1.5 mg/dL    Albumin 3.8 3.2 - 4.9 g/dL    Total Protein 6.2 6.0 - 8.2 g/dL    Globulin 2.4 1.9 - 3.5 g/dL    A-G Ratio 1.6 g/dL   Magnesium    Collection Time: 09/04/20  5:41 AM   Result Value Ref Range    Magnesium 1.7 1.5 - 2.5 mg/dL   ESTIMATED GFR    Collection Time: 09/04/20  5:41 AM   Result Value Ref Range    GFR If African American >60 >60 mL/min/1.73 m 2    GFR If Non African American >60 >60 mL/min/1.73 m 2   Basic Metabolic Panel    Collection Time: 09/05/20  6:06 AM   Result Value Ref Range    Sodium 133 (L) 135 - 145 mmol/L    Potassium 3.7 3.6 - 5.5 mmol/L    Chloride 104 96 - 112 mmol/L    Co2 18 (L) 20 - 33 mmol/L    Glucose 100 (H) 65 - 99 mg/dL    Bun 6 (L) 8 - 22 mg/dL    Creatinine 0.35 (L) 0.50 - 1.40 mg/dL    Calcium 8.9 8.5 - 10.5 mg/dL    Anion Gap 11.0 7.0 - 16.0   MAGNESIUM    Collection Time: 09/05/20  6:06 AM   Result Value Ref Range    Magnesium 1.9 1.5 - 2.5 mg/dL   PHOSPHORUS    Collection Time: 09/05/20  6:06 AM   Result Value Ref Range    Phosphorus 2.4 (L) 2.5 - 4.5 mg/dL   ESTIMATED GFR    Collection Time: 09/05/20  6:06 AM   Result Value Ref Range    GFR If African American >60 >60 mL/min/1.73 m 2    GFR If Non African American >60 >60 mL/min/1.73 m 2   POCT Pregnancy    Collection Time: 09/09/20  9:18 AM   Result Value Ref Range    POC Urine Pregnancy Test Positive Negative    Internal Control Positive Positive     Internal Control Negative Negative        Ultrasound:     Transvaginal US performed and per my read:    Indication: Dating    Findings: patino intrauterine pregnancy @7 weeks and 6 days by CRL.   Positive gestational sac.  Positive yolk sac.   Positive fetal cardiac activity @170 BPM.   Right ovary normal. Left Ovary normal. Cervical length 3.9 cm.   No free fluid in the  cul-de-sac.    Impression: viable IUP @7 weeks and 6 days. EDC by US of 2021      Assessment:  30 y.o.   amenorrhea  Pregnancy exam/test positive    Plan:  1 week for new OB appt  Pap smear at new OB visit  Normal pregnancy symptoms discussed  SAB/labor precautions educated  Order prenatal labs and any additional imaging needed at new OB visit  Drink at least 2 liters of water daily  Exercise 30 minutes daily  Call MD w/ questions or concerns    We will also discuss quad screen, CF and SMA testing at new OB visit    Continue Zofran and Compazine.  Omeprazole added given her symptoms of reflux.    Brat diet discussed with patient.  Follow-up next week.  If continues to lose weight, may need in-home hydration/nutrition, possibly inpatient treatment    Final due date 2021, consistent with today's ultrasound and last menstrual period    All questions answered

## 2020-09-10 ENCOUNTER — TELEPHONE (OUTPATIENT)
Dept: OBGYN | Facility: CLINIC | Age: 30
End: 2020-09-10

## 2020-09-10 NOTE — TELEPHONE ENCOUNTER
Pt LM on VM stating she needs FMLA forms stating she is unable to work due to pregnancy  Called pt back, states she has missed work for the last 2 1/5 weeks due to hyperemesis. Pt reports she is taking Zofran and Compazine but unable to keep anything down. Per consultation with Yumiko Mccormack, we are not able to fill out forms as she established care yesterday and if she is having trouble keeping anything down, she needs to do to ER for further evaluation. Pt informed, voiced understanding and will comply

## 2020-09-14 ENCOUNTER — HOSPITAL ENCOUNTER (EMERGENCY)
Facility: MEDICAL CENTER | Age: 30
End: 2020-09-14
Attending: EMERGENCY MEDICINE
Payer: MEDICAID

## 2020-09-14 ENCOUNTER — APPOINTMENT (OUTPATIENT)
Dept: RADIOLOGY | Facility: MEDICAL CENTER | Age: 30
End: 2020-09-14
Attending: EMERGENCY MEDICINE
Payer: MEDICAID

## 2020-09-14 VITALS
DIASTOLIC BLOOD PRESSURE: 83 MMHG | HEART RATE: 91 BPM | OXYGEN SATURATION: 99 % | TEMPERATURE: 97.5 F | BODY MASS INDEX: 24.15 KG/M2 | WEIGHT: 123 LBS | SYSTOLIC BLOOD PRESSURE: 121 MMHG | RESPIRATION RATE: 16 BRPM | HEIGHT: 60 IN

## 2020-09-14 DIAGNOSIS — O21.0 HYPEREMESIS GRAVIDARUM: ICD-10-CM

## 2020-09-14 LAB
ALBUMIN SERPL BCP-MCNC: 4.4 G/DL (ref 3.2–4.9)
ALBUMIN/GLOB SERPL: 1.5 G/DL
ALP SERPL-CCNC: 56 U/L (ref 30–99)
ALT SERPL-CCNC: 30 U/L (ref 2–50)
ANION GAP SERPL CALC-SCNC: 18 MMOL/L (ref 7–16)
APPEARANCE UR: CLEAR
AST SERPL-CCNC: 20 U/L (ref 12–45)
BASOPHILS # BLD AUTO: 0.6 % (ref 0–1.8)
BASOPHILS # BLD: 0.04 K/UL (ref 0–0.12)
BILIRUB SERPL-MCNC: 0.7 MG/DL (ref 0.1–1.5)
BILIRUB UR QL STRIP.AUTO: ABNORMAL
BUN SERPL-MCNC: 13 MG/DL (ref 8–22)
CALCIUM SERPL-MCNC: 10.3 MG/DL (ref 8.5–10.5)
CHLORIDE SERPL-SCNC: 97 MMOL/L (ref 96–112)
CO2 SERPL-SCNC: 17 MMOL/L (ref 20–33)
COLOR UR: YELLOW
CREAT SERPL-MCNC: 0.39 MG/DL (ref 0.5–1.4)
EOSINOPHIL # BLD AUTO: 0.04 K/UL (ref 0–0.51)
EOSINOPHIL NFR BLD: 0.6 % (ref 0–6.9)
ERYTHROCYTE [DISTWIDTH] IN BLOOD BY AUTOMATED COUNT: 43.5 FL (ref 35.9–50)
GLOBULIN SER CALC-MCNC: 2.9 G/DL (ref 1.9–3.5)
GLUCOSE SERPL-MCNC: 89 MG/DL (ref 65–99)
GLUCOSE UR STRIP.AUTO-MCNC: NEGATIVE MG/DL
HCT VFR BLD AUTO: 42.6 % (ref 37–47)
HGB BLD-MCNC: 14.5 G/DL (ref 12–16)
IMM GRANULOCYTES # BLD AUTO: 0.03 K/UL (ref 0–0.11)
IMM GRANULOCYTES NFR BLD AUTO: 0.5 % (ref 0–0.9)
KETONES UR STRIP.AUTO-MCNC: >=80 MG/DL
LEUKOCYTE ESTERASE UR QL STRIP.AUTO: NEGATIVE
LIPASE SERPL-CCNC: 95 U/L (ref 11–82)
LYMPHOCYTES # BLD AUTO: 0.97 K/UL (ref 1–4.8)
LYMPHOCYTES NFR BLD: 15 % (ref 22–41)
MCH RBC QN AUTO: 26.3 PG (ref 27–33)
MCHC RBC AUTO-ENTMCNC: 34 G/DL (ref 33.6–35)
MCV RBC AUTO: 77.3 FL (ref 81.4–97.8)
MICRO URNS: ABNORMAL
MONOCYTES # BLD AUTO: 0.56 K/UL (ref 0–0.85)
MONOCYTES NFR BLD AUTO: 8.7 % (ref 0–13.4)
NEUTROPHILS # BLD AUTO: 4.83 K/UL (ref 2–7.15)
NEUTROPHILS NFR BLD: 74.6 % (ref 44–72)
NITRITE UR QL STRIP.AUTO: NEGATIVE
NRBC # BLD AUTO: 0 K/UL
NRBC BLD-RTO: 0 /100 WBC
PH UR STRIP.AUTO: 6 [PH] (ref 5–8)
PLATELET # BLD AUTO: 243 K/UL (ref 164–446)
PMV BLD AUTO: 10.4 FL (ref 9–12.9)
POTASSIUM SERPL-SCNC: 3.9 MMOL/L (ref 3.6–5.5)
PROT SERPL-MCNC: 7.3 G/DL (ref 6–8.2)
PROT UR QL STRIP: NEGATIVE MG/DL
RBC # BLD AUTO: 5.51 M/UL (ref 4.2–5.4)
RBC UR QL AUTO: NEGATIVE
SODIUM SERPL-SCNC: 132 MMOL/L (ref 135–145)
SP GR UR REFRACTOMETRY: 1.03
UROBILINOGEN UR STRIP.AUTO-MCNC: 0.2 MG/DL
WBC # BLD AUTO: 6.5 K/UL (ref 4.8–10.8)

## 2020-09-14 PROCEDURE — 81003 URINALYSIS AUTO W/O SCOPE: CPT

## 2020-09-14 PROCEDURE — 700105 HCHG RX REV CODE 258: Performed by: EMERGENCY MEDICINE

## 2020-09-14 PROCEDURE — 96374 THER/PROPH/DIAG INJ IV PUSH: CPT

## 2020-09-14 PROCEDURE — 80053 COMPREHEN METABOLIC PANEL: CPT

## 2020-09-14 PROCEDURE — 96375 TX/PRO/DX INJ NEW DRUG ADDON: CPT

## 2020-09-14 PROCEDURE — 76801 OB US < 14 WKS SINGLE FETUS: CPT

## 2020-09-14 PROCEDURE — 99284 EMERGENCY DEPT VISIT MOD MDM: CPT

## 2020-09-14 PROCEDURE — 700111 HCHG RX REV CODE 636 W/ 250 OVERRIDE (IP): Performed by: EMERGENCY MEDICINE

## 2020-09-14 PROCEDURE — 83690 ASSAY OF LIPASE: CPT

## 2020-09-14 PROCEDURE — 85025 COMPLETE CBC W/AUTO DIFF WBC: CPT

## 2020-09-14 RX ORDER — DEXTROSE AND SODIUM CHLORIDE 5; .9 G/100ML; G/100ML
INJECTION, SOLUTION INTRAVENOUS ONCE
Status: COMPLETED | OUTPATIENT
Start: 2020-09-14 | End: 2020-09-14

## 2020-09-14 RX ORDER — PROCHLORPERAZINE MALEATE 5 MG/1
5 TABLET ORAL EVERY 6 HOURS PRN
Qty: 30 TAB | Refills: 0 | Status: SHIPPED | OUTPATIENT
Start: 2020-09-14 | End: 2021-03-23

## 2020-09-14 RX ORDER — ONDANSETRON 4 MG/1
4 TABLET, ORALLY DISINTEGRATING ORAL EVERY 8 HOURS PRN
Qty: 10 TAB | Refills: 0 | Status: SHIPPED | OUTPATIENT
Start: 2020-09-14 | End: 2021-03-23

## 2020-09-14 RX ORDER — ONDANSETRON 2 MG/ML
4 INJECTION INTRAMUSCULAR; INTRAVENOUS ONCE
Status: COMPLETED | OUTPATIENT
Start: 2020-09-14 | End: 2020-09-14

## 2020-09-14 RX ORDER — PROCHLORPERAZINE EDISYLATE 5 MG/ML
10 INJECTION INTRAMUSCULAR; INTRAVENOUS ONCE
Status: COMPLETED | OUTPATIENT
Start: 2020-09-14 | End: 2020-09-14

## 2020-09-14 RX ADMIN — DEXTROSE AND SODIUM CHLORIDE 1000 ML: 5; 900 INJECTION, SOLUTION INTRAVENOUS at 08:39

## 2020-09-14 RX ADMIN — PROCHLORPERAZINE EDISYLATE 10 MG: 5 INJECTION INTRAMUSCULAR; INTRAVENOUS at 10:17

## 2020-09-14 RX ADMIN — ONDANSETRON 4 MG: 2 INJECTION INTRAMUSCULAR; INTRAVENOUS at 08:40

## 2020-09-14 ASSESSMENT — FIBROSIS 4 INDEX: FIB4 SCORE: 0.66

## 2020-09-14 NOTE — ED TRIAGE NOTES
Chief Complaint   Patient presents with   • Abdominal Pain     8wks pregnant, denies vaginal bleeding   • Hip Pain     bilateral, no injury   • N/V     Pt wheeled to triage with above complaints.Pt takes zofran and compazine at home but no relief.

## 2020-09-14 NOTE — ED NOTES
Pt discharged with one prescription to be picked up at pharmacy. Pt verbalized understanding of discharge education as well as medication information and signs and symptoms for which to return to the ED. Pt brought to lobby via wheelchair by Tech for transporation home provided by spouse.

## 2020-09-14 NOTE — ED PROVIDER NOTES
ED Provider Note    CHIEF COMPLAINT  Chief Complaint   Patient presents with   • Abdominal Pain     8wks pregnant, denies vaginal bleeding   • Hip Pain     bilateral, no injury   • N/V       HPI  Luba Lind is a 30 y.o. female who presents with nausea and vomiting.  Patient is currently 8 weeks pregnant.  She has been dealing with nausea and vomiting throughout this pregnancy.  She has been admitted to the hospital previously with hyperemesis.  She started vomiting this morning.  It is been uncontrollable.  Not able to take any orals.  Tried her home Zofran without relief.  She denies any lower abdominal pain.  She does have pain about her ribs and her hips after throwing up.  She has not had any bleeding or vaginal discharge.  Denies dysuria hematuria frequency.    REVIEW OF SYSTEMS  As per HPI, otherwise a 10 point review of systems is negative    PAST MEDICAL HISTORY  Past Medical History:   Diagnosis Date   • Fetal pyelectasis and EIC. negative maternal quad screen. declined amniocentesis 2015   • Headache(784.0)     before and after pregnancy.   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       SOCIAL HISTORY  Social History     Tobacco Use   • Smoking status: Former Smoker     Packs/day: 0.50     Years: 0.50     Pack years: 0.25     Types: Cigarettes     Quit date: 2012     Years since quittin.6   • Smokeless tobacco: Former User     Quit date: 2012   • Tobacco comment: 1 cig every week or 2 weeks.   Substance Use Topics   • Alcohol use: Not Currently     Comment: not presently   • Drug use: Not Currently     Types: Marijuana, Inhaled     Comment: Last smoked Marijuana        SURGICAL HISTORY  Past Surgical History:   Procedure Laterality Date   • OTHER      14 y/o endoscopy; GI bleed resulting       CURRENT MEDICATIONS  Home Medications     Reviewed by Bessie Oakes R.N. (Registered Nurse) on  09/14/20 at 0748  Med List Status: Partial   Medication Last Dose Status   calcium carbonate (TUMS) 500 MG Chew Tab 9/13/2020 Active   omeprazole (PRILOSEC) 20 MG delayed-release capsule 9/13/2020 Active   ondansetron (ZOFRAN ODT) 4 MG TABLET DISPERSIBLE 9/14/2020 Active   prenatal plus vitamin (STUARTNATAL 1+1) 27-1 MG Tab tablet  Active   prochlorperazine (COMPAZINE) 5 MG Tab 9/14/2020 Active                ALLERGIES  No Known Allergies    PHYSICAL EXAM  VITAL SIGNS: /82   Pulse 85   Temp 36.4 °C (97.5 °F) (Temporal)   Resp 18   Ht 1.524 m (5')   Wt 55.8 kg (123 lb)   LMP 07/23/2020   SpO2 97%   BMI 24.02 kg/m²    Constitutional: Awake and alert.  Appears weak and tired  HENT:  Atraumatic, Normocephalic.Oropharynx dry mucus membranes, Nose normal inspection.   Eyes: Normal inspection  Neck: Supple  Cardiovascular: Normal heart rate, Normal rhythm.  Symmetric peripheral pulses.   Thorax & Lungs: No respiratory distress, No wheezing, No rales, No rhonchi, No chest tenderness.   Abdomen: Bowel sounds normal, soft, non-distended, nontender, no mass  Skin: Warm, Dry, No rash.   Back: No tenderness, No CVA tenderness.   Neurologic: Grossly normal   Psychiatric: Anxious appearing    RADIOLOGY/PROCEDURES  US-OB 1ST TRIMESTER WITH TRANSVAGINAL (COMBO)   Final Result      1.  Single live intrauterine gestation of an estimated 8 weeks 4 days.   2.  Small subchorionic hemorrhage.  Follow-up recommended.   3.  Probable RIGHT ovary corpus luteum cyst.           Imaging is interpreted by radiologist    Labs:  Results for orders placed or performed during the hospital encounter of 09/14/20   CBC WITH DIFFERENTIAL   Result Value Ref Range    WBC 6.5 4.8 - 10.8 K/uL    RBC 5.51 (H) 4.20 - 5.40 M/uL    Hemoglobin 14.5 12.0 - 16.0 g/dL    Hematocrit 42.6 37.0 - 47.0 %    MCV 77.3 (L) 81.4 - 97.8 fL    MCH 26.3 (L) 27.0 - 33.0 pg    MCHC 34.0 33.6 - 35.0 g/dL    RDW 43.5 35.9 - 50.0 fL    Platelet Count 243 164 - 031  K/uL    MPV 10.4 9.0 - 12.9 fL    Neutrophils-Polys 74.60 (H) 44.00 - 72.00 %    Lymphocytes 15.00 (L) 22.00 - 41.00 %    Monocytes 8.70 0.00 - 13.40 %    Eosinophils 0.60 0.00 - 6.90 %    Basophils 0.60 0.00 - 1.80 %    Immature Granulocytes 0.50 0.00 - 0.90 %    Nucleated RBC 0.00 /100 WBC    Neutrophils (Absolute) 4.83 2.00 - 7.15 K/uL    Lymphs (Absolute) 0.97 (L) 1.00 - 4.80 K/uL    Monos (Absolute) 0.56 0.00 - 0.85 K/uL    Eos (Absolute) 0.04 0.00 - 0.51 K/uL    Baso (Absolute) 0.04 0.00 - 0.12 K/uL    Immature Granulocytes (abs) 0.03 0.00 - 0.11 K/uL    NRBC (Absolute) 0.00 K/uL   COMP METABOLIC PANEL   Result Value Ref Range    Sodium 132 (L) 135 - 145 mmol/L    Potassium 3.9 3.6 - 5.5 mmol/L    Chloride 97 96 - 112 mmol/L    Co2 17 (L) 20 - 33 mmol/L    Anion Gap 18.0 (H) 7.0 - 16.0    Glucose 89 65 - 99 mg/dL    Bun 13 8 - 22 mg/dL    Creatinine 0.39 (L) 0.50 - 1.40 mg/dL    Calcium 10.3 8.5 - 10.5 mg/dL    AST(SGOT) 20 12 - 45 U/L    ALT(SGPT) 30 2 - 50 U/L    Alkaline Phosphatase 56 30 - 99 U/L    Total Bilirubin 0.7 0.1 - 1.5 mg/dL    Albumin 4.4 3.2 - 4.9 g/dL    Total Protein 7.3 6.0 - 8.2 g/dL    Globulin 2.9 1.9 - 3.5 g/dL    A-G Ratio 1.5 g/dL   URINALYSIS CULTURE, IF INDICATED    Specimen: Urine   Result Value Ref Range    Color Yellow     Character Clear     Ph 6.0 5.0 - 8.0    Glucose Negative Negative mg/dL    Ketones >=80 (A) Negative mg/dL    Protein Negative Negative mg/dL    Bilirubin Small (A) Negative    Urobilinogen, Urine 0.2 Negative    Nitrite Negative Negative    Leukocyte Esterase Negative Negative    Occult Blood Negative Negative    Micro Urine Req see below    LIPASE   Result Value Ref Range    Lipase 95 (H) 11 - 82 U/L   ESTIMATED GFR   Result Value Ref Range    GFR If African American >60 >60 mL/min/1.73 m 2    GFR If Non African American >60 >60 mL/min/1.73 m 2   REFRACTOMETER SG   Result Value Ref Range    Specific Gravity 1.028        Medications   D5 NS infusion (1,000 mL  Intravenous New Bag 9/14/20 8239)   ondansetron (ZOFRAN) syringe/vial injection 4 mg (4 mg Intravenous Given 9/14/20 0840)   prochlorperazine (COMPAZINE) injection 10 mg (10 mg Intravenous Given 9/14/20 1017)       HYDRATION: Based on the patient's presentation of Acute Vomiting the patient was given IV fluids. IV Hydration was used because oral hydration was not adequate alone. Upon recheck following hydration, the patient was Stable.    COURSE & MEDICAL DECISION MAKING  Patient presents with recurrent nausea and vomiting associated with her pregnancy.  She appeared dehydrated on examination.  An IV was started.  She was given 1 L of D5 normal saline.  She was given Zofran.  She had some improvement, but persistent nausea and subsequently given Compazine intravenously.  Her symptoms abated.  Her laboratory data returned consistent with repeated vomiting, but no emergency process.  After treatment she was able to take p.o.'s.  She had some crackers and drink juice.  No further vomiting.  She appears appropriate for outpatient management.  I have refilled her medications Zofran and Compazine.  She will need to follow-up with her OB/GYN.  She should return to the ER for uncontrolled vomiting, fevers, pain or concern.  Given standard instructions on hyperemesis diet.      FINAL IMPRESSION  1.  Hyperemesis gravidarum      This dictation was created using voice recognition software. The accuracy of the dictation is limited to the abilities of the software.  The nursing notes were reviewed and certain aspects of this information were incorporated into this note.      Electronically signed by: Adrian Jimenez M.D., 9/14/2020 11:21 AM

## 2020-09-14 NOTE — ED NOTES
Pt returned from ultrasound via wheelchair. Pt ambulated to the restroom with steady gait. Urine sample obtained and sent to lab

## 2020-09-15 ENCOUNTER — GYNECOLOGY VISIT (OUTPATIENT)
Dept: OBGYN | Facility: CLINIC | Age: 30
End: 2020-09-15
Payer: MEDICAID

## 2020-09-15 VITALS
BODY MASS INDEX: 23.36 KG/M2 | HEIGHT: 60 IN | DIASTOLIC BLOOD PRESSURE: 68 MMHG | SYSTOLIC BLOOD PRESSURE: 106 MMHG | WEIGHT: 119 LBS

## 2020-09-15 DIAGNOSIS — Z32.01 PREGNANCY EXAMINATION OR TEST, POSITIVE RESULT: ICD-10-CM

## 2020-09-15 DIAGNOSIS — R51.9 PREGNANCY HEADACHE IN FIRST TRIMESTER: ICD-10-CM

## 2020-09-15 DIAGNOSIS — O26.891 PREGNANCY HEADACHE IN FIRST TRIMESTER: ICD-10-CM

## 2020-09-15 LAB
APPEARANCE UR: NORMAL
BILIRUB UR STRIP-MCNC: NORMAL MG/DL
COLOR UR AUTO: NORMAL
GLUCOSE UR STRIP.AUTO-MCNC: NEGATIVE MG/DL
INT CON NEG: NEGATIVE
INT CON POS: POSITIVE
KETONES UR STRIP.AUTO-MCNC: 80 MG/DL
LEUKOCYTE ESTERASE UR QL STRIP.AUTO: NORMAL
NITRITE UR QL STRIP.AUTO: NEGATIVE
PH UR STRIP.AUTO: 6 [PH] (ref 5–8)
POC URINE PREGNANCY TEST: POSITIVE
PROT UR QL STRIP: 30 MG/DL
RBC UR QL AUTO: NEGATIVE
SP GR UR STRIP.AUTO: 1.02
UROBILINOGEN UR STRIP-MCNC: NORMAL MG/DL

## 2020-09-15 PROCEDURE — 99214 OFFICE O/P EST MOD 30 MIN: CPT | Performed by: OBSTETRICS & GYNECOLOGY

## 2020-09-15 PROCEDURE — 81025 URINE PREGNANCY TEST: CPT | Performed by: OBSTETRICS & GYNECOLOGY

## 2020-09-15 PROCEDURE — 81002 URINALYSIS NONAUTO W/O SCOPE: CPT | Performed by: OBSTETRICS & GYNECOLOGY

## 2020-09-15 RX ORDER — PROMETHAZINE HYDROCHLORIDE 25 MG/1
25 SUPPOSITORY RECTAL EVERY 6 HOURS PRN
Qty: 12 SUPPOSITORY | Refills: 0 | Status: SHIPPED | OUTPATIENT
Start: 2020-09-15 | End: 2020-09-28

## 2020-09-15 ASSESSMENT — FIBROSIS 4 INDEX: FIB4 SCORE: 0.47

## 2020-09-15 NOTE — LETTER
September 15, 2020              Luba Lind is currently being cared for at [facility] in [place]. Confirmation of pregnancy with a due date of 04/24/2021.            Thank you,          Richard Patten M.D.    Electronically Signed

## 2020-09-15 NOTE — PROGRESS NOTES
30 y.o.  female previously seen for : Chief Complaint:  Hyperemesis    Specialty Problems     None      . Patient now here in follow up. PAtient with refractory N/V and seen both at clinic and last PM at ED. Other than dehydration and ketonuria , patient responded to IV fluids ( 1 liter  ) , and sent home   Patient still complains of m,inimal relief  With Zofran     Patient's last menstrual period was 2020 (exact date).      Subjective: Abdominal Pain: negative    Vaginal Bleedingnegative  Menstrual Cycle: normal: negative  Dysmenorrhea:negative:   Dyspareunia:negative  Urinary Symptoms: negative   Vaginal Discharge:{No          Current Outpatient Medications:   •  promethazine (PHENERGAN) 25 MG Suppos, Insert 1 Suppository in rectum every 6 hours as needed for Nausea/Vomiting., Disp: 12 Suppository, Rfl: 0  •  ondansetron (ZOFRAN ODT) 4 MG TABLET DISPERSIBLE, Take 1 Tab by mouth every 8 hours as needed., Disp: 10 Tab, Rfl: 0  •  prochlorperazine (COMPAZINE) 5 MG Tab, Take 1 Tab by mouth every 6 hours as needed for Nausea/Vomiting., Disp: 30 Tab, Rfl: 0  •  omeprazole (PRILOSEC) 20 MG delayed-release capsule, Take 1 Cap by mouth every day. (Patient not taking: Reported on 9/15/2020), Disp: 30 Cap, Rfl: 2  •  prenatal plus vitamin (STUARTNATAL 1+1) 27-1 MG Tab tablet, Take 1 Tab by mouth every day. (Patient not taking: Reported on 9/15/2020), Disp: 30 Tab, Rfl: 0  •  calcium carbonate (TUMS) 500 MG Chew Tab, Take 1 Tab by mouth 2 times a day as needed (Heartburn/Indigestion). (Patient not taking: Reported on 9/15/2020), Disp: 30 Tab, Rfl: 0  ROS: no change in ROS since visit of : 9/10/2020.  :  Recent Results (from the past 336 hour(s))   EKG (NOW)    Collection Time: 20  4:40 PM   Result Value Ref Range    Report       [facility]    Test Date:  2020  Pt Name:    SURINDER SETHI                 Department: ER  MRN:        [id number]                       Room:  Gender:     Female                       Technician: [id number]  :        [id number]                   Requested By:ER TRIAGE PROTOCOL  Order #:    [id number]                    Reading MD: MELO WOOD MD    Measurements  Intervals                                Axis  Rate:       99                           P:          70  KS:         136                          QRS:        83  QRSD:       72                           T:          56  QT:         328  QTc:        421    Interpretive Statements  SINUS RHYTHM  NICOLE, CONSIDER BIATRIAL ABNORMALITIES  No previous ECG available for comparison  No acute ischemia or arrhythmia  Electronically Signed On 9-3-2020 21:09:05 PDT by MELO WOOD MD     CBC with Differential    Collection Time: 20  5:54 PM   Result Value Ref Range    WBC 8.0 4.8 - 10.8 K/uL    RBC 5.60 (H) 4.20 - 5.40 M/uL    Hemoglobin 14.4 12.0 - 16.0 g/dL    Hematocrit 42.3 37.0 - 47.0 %    MCV 75.5 (L) 81.4 - 97.8 fL    MCH 25.7 (L) 27.0 - 33.0 pg    MCHC 34.0 33.6 - 35.0 g/dL    RDW 42.0 35.9 - 50.0 fL    Platelet Count 233 164 - 446 K/uL    MPV 10.6 9.0 - 12.9 fL    Neutrophils-Polys 77.50 (H) 44.00 - 72.00 %    Lymphocytes 14.40 (L) 22.00 - 41.00 %    Monocytes 7.20 0.00 - 13.40 %    Eosinophils 0.10 0.00 - 6.90 %    Basophils 0.50 0.00 - 1.80 %    Immature Granulocytes 0.30 0.00 - 0.90 %    Nucleated RBC 0.00 /100 WBC    Neutrophils (Absolute) 6.18 2.00 - 7.15 K/uL    Lymphs (Absolute) 1.15 1.00 - 4.80 K/uL    Monos (Absolute) 0.57 0.00 - 0.85 K/uL    Eos (Absolute) 0.01 0.00 - 0.51 K/uL    Baso (Absolute) 0.04 0.00 - 0.12 K/uL    Immature Granulocytes (abs) 0.02 0.00 - 0.11 K/uL    NRBC (Absolute) 0.00 K/uL   Complete Metabolic Panel (CMP)    Collection Time: 20  5:54 PM   Result Value Ref Range    Sodium 132 (L) 135 - 145 mmol/L    Potassium 3.8 3.6 - 5.5 mmol/L    Chloride 97 96 - 112 mmol/L    Co2 15 (L) 20 - 33 mmol/L    Anion Gap 20.0 (H) 7.0 - 16.0    Glucose 84 65 - 99  mg/dL    Bun 12 8 - 22 mg/dL    Creatinine 0.36 (L) 0.50 - 1.40 mg/dL    Calcium 10.5 8.5 - 10.5 mg/dL    AST(SGOT) 9 (L) 12 - 45 U/L    ALT(SGPT) 8 2 - 50 U/L    Alkaline Phosphatase 57 30 - 99 U/L    Total Bilirubin 0.7 0.1 - 1.5 mg/dL    Albumin 4.7 3.2 - 4.9 g/dL    Total Protein 7.6 6.0 - 8.2 g/dL    Globulin 2.9 1.9 - 3.5 g/dL    A-G Ratio 1.6 g/dL   Troponin    Collection Time: 09/03/20  5:54 PM   Result Value Ref Range    Troponin T <6 6 - 19 ng/L   ESTIMATED GFR    Collection Time: 09/03/20  5:54 PM   Result Value Ref Range    GFR If African American >60 >60 mL/min/1.73 m 2    GFR If Non African American >60 >60 mL/min/1.73 m 2   CBC with Differential    Collection Time: 09/04/20  5:41 AM   Result Value Ref Range    WBC 6.0 4.8 - 10.8 K/uL    RBC 4.69 4.20 - 5.40 M/uL    Hemoglobin 12.1 12.0 - 16.0 g/dL    Hematocrit 36.8 (L) 37.0 - 47.0 %    MCV 78.5 (L) 81.4 - 97.8 fL    MCH 25.8 (L) 27.0 - 33.0 pg    MCHC 32.9 (L) 33.6 - 35.0 g/dL    RDW 43.7 35.9 - 50.0 fL    Platelet Count 189 164 - 446 K/uL    MPV 10.8 9.0 - 12.9 fL    Neutrophils-Polys 73.30 (H) 44.00 - 72.00 %    Lymphocytes 18.50 (L) 22.00 - 41.00 %    Monocytes 7.00 0.00 - 13.40 %    Eosinophils 0.50 0.00 - 6.90 %    Basophils 0.50 0.00 - 1.80 %    Immature Granulocytes 0.20 0.00 - 0.90 %    Nucleated RBC 0.00 /100 WBC    Neutrophils (Absolute) 4.41 2.00 - 7.15 K/uL    Lymphs (Absolute) 1.11 1.00 - 4.80 K/uL    Monos (Absolute) 0.42 0.00 - 0.85 K/uL    Eos (Absolute) 0.03 0.00 - 0.51 K/uL    Baso (Absolute) 0.03 0.00 - 0.12 K/uL    Immature Granulocytes (abs) 0.01 0.00 - 0.11 K/uL    NRBC (Absolute) 0.00 K/uL   Comp Metabolic Panel (CMP)    Collection Time: 09/04/20  5:41 AM   Result Value Ref Range    Sodium 134 (L) 135 - 145 mmol/L    Potassium 3.7 3.6 - 5.5 mmol/L    Chloride 104 96 - 112 mmol/L    Co2 15 (L) 20 - 33 mmol/L    Anion Gap 15.0 7.0 - 16.0    Glucose 79 65 - 99 mg/dL    Bun 11 8 - 22 mg/dL    Creatinine 0.38 (L) 0.50 - 1.40 mg/dL     Calcium 9.2 8.5 - 10.5 mg/dL    AST(SGOT) 9 (L) 12 - 45 U/L    ALT(SGPT) 5 2 - 50 U/L    Alkaline Phosphatase 50 30 - 99 U/L    Total Bilirubin 0.6 0.1 - 1.5 mg/dL    Albumin 3.8 3.2 - 4.9 g/dL    Total Protein 6.2 6.0 - 8.2 g/dL    Globulin 2.4 1.9 - 3.5 g/dL    A-G Ratio 1.6 g/dL   Magnesium    Collection Time: 09/04/20  5:41 AM   Result Value Ref Range    Magnesium 1.7 1.5 - 2.5 mg/dL   ESTIMATED GFR    Collection Time: 09/04/20  5:41 AM   Result Value Ref Range    GFR If African American >60 >60 mL/min/1.73 m 2    GFR If Non African American >60 >60 mL/min/1.73 m 2   Basic Metabolic Panel    Collection Time: 09/05/20  6:06 AM   Result Value Ref Range    Sodium 133 (L) 135 - 145 mmol/L    Potassium 3.7 3.6 - 5.5 mmol/L    Chloride 104 96 - 112 mmol/L    Co2 18 (L) 20 - 33 mmol/L    Glucose 100 (H) 65 - 99 mg/dL    Bun 6 (L) 8 - 22 mg/dL    Creatinine 0.35 (L) 0.50 - 1.40 mg/dL    Calcium 8.9 8.5 - 10.5 mg/dL    Anion Gap 11.0 7.0 - 16.0   MAGNESIUM    Collection Time: 09/05/20  6:06 AM   Result Value Ref Range    Magnesium 1.9 1.5 - 2.5 mg/dL   PHOSPHORUS    Collection Time: 09/05/20  6:06 AM   Result Value Ref Range    Phosphorus 2.4 (L) 2.5 - 4.5 mg/dL   ESTIMATED GFR    Collection Time: 09/05/20  6:06 AM   Result Value Ref Range    GFR If African American >60 >60 mL/min/1.73 m 2    GFR If Non African American >60 >60 mL/min/1.73 m 2   POCT Pregnancy    Collection Time: 09/09/20  9:18 AM   Result Value Ref Range    POC Urine Pregnancy Test Positive Negative    Internal Control Positive Positive     Internal Control Negative Negative    CBC WITH DIFFERENTIAL    Collection Time: 09/14/20  8:23 AM   Result Value Ref Range    WBC 6.5 4.8 - 10.8 K/uL    RBC 5.51 (H) 4.20 - 5.40 M/uL    Hemoglobin 14.5 12.0 - 16.0 g/dL    Hematocrit 42.6 37.0 - 47.0 %    MCV 77.3 (L) 81.4 - 97.8 fL    MCH 26.3 (L) 27.0 - 33.0 pg    MCHC 34.0 33.6 - 35.0 g/dL    RDW 43.5 35.9 - 50.0 fL    Platelet Count 243 164 - 446 K/uL    MPV  10.4 9.0 - 12.9 fL    Neutrophils-Polys 74.60 (H) 44.00 - 72.00 %    Lymphocytes 15.00 (L) 22.00 - 41.00 %    Monocytes 8.70 0.00 - 13.40 %    Eosinophils 0.60 0.00 - 6.90 %    Basophils 0.60 0.00 - 1.80 %    Immature Granulocytes 0.50 0.00 - 0.90 %    Nucleated RBC 0.00 /100 WBC    Neutrophils (Absolute) 4.83 2.00 - 7.15 K/uL    Lymphs (Absolute) 0.97 (L) 1.00 - 4.80 K/uL    Monos (Absolute) 0.56 0.00 - 0.85 K/uL    Eos (Absolute) 0.04 0.00 - 0.51 K/uL    Baso (Absolute) 0.04 0.00 - 0.12 K/uL    Immature Granulocytes (abs) 0.03 0.00 - 0.11 K/uL    NRBC (Absolute) 0.00 K/uL   COMP METABOLIC PANEL    Collection Time: 09/14/20  8:23 AM   Result Value Ref Range    Sodium 132 (L) 135 - 145 mmol/L    Potassium 3.9 3.6 - 5.5 mmol/L    Chloride 97 96 - 112 mmol/L    Co2 17 (L) 20 - 33 mmol/L    Anion Gap 18.0 (H) 7.0 - 16.0    Glucose 89 65 - 99 mg/dL    Bun 13 8 - 22 mg/dL    Creatinine 0.39 (L) 0.50 - 1.40 mg/dL    Calcium 10.3 8.5 - 10.5 mg/dL    AST(SGOT) 20 12 - 45 U/L    ALT(SGPT) 30 2 - 50 U/L    Alkaline Phosphatase 56 30 - 99 U/L    Total Bilirubin 0.7 0.1 - 1.5 mg/dL    Albumin 4.4 3.2 - 4.9 g/dL    Total Protein 7.3 6.0 - 8.2 g/dL    Globulin 2.9 1.9 - 3.5 g/dL    A-G Ratio 1.5 g/dL   LIPASE    Collection Time: 09/14/20  8:23 AM   Result Value Ref Range    Lipase 95 (H) 11 - 82 U/L   ESTIMATED GFR    Collection Time: 09/14/20  8:23 AM   Result Value Ref Range    GFR If African American >60 >60 mL/min/1.73 m 2    GFR If Non African American >60 >60 mL/min/1.73 m 2   URINALYSIS CULTURE, IF INDICATED    Collection Time: 09/14/20  9:52 AM    Specimen: Urine   Result Value Ref Range    Color Yellow     Character Clear     Ph 6.0 5.0 - 8.0    Glucose Negative Negative mg/dL    Ketones >=80 (A) Negative mg/dL    Protein Negative Negative mg/dL    Bilirubin Small (A) Negative    Urobilinogen, Urine 0.2 Negative    Nitrite Negative Negative    Leukocyte Esterase Negative Negative    Occult Blood Negative Negative     Micro Urine Req see below    REFRACTOMETER SG    Collection Time: 20  9:52 AM   Result Value Ref Range    Specific Gravity 1.028        Vitals:    09/15/20 1039   BP: 106/68   BP Location: Left arm   Patient Position: Sitting   Weight: 54 kg (119 lb)   Height: 1.524 m (5')     Past Medical History:   Diagnosis Date   • Fetal pyelectasis and EIC. negative maternal quad screen. declined amniocentesis 2015   • Headache(784.0)     before and after pregnancy.   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     PGYN: None  Social History     Socioeconomic History   • Marital status: Single     Spouse name: Not on file   • Number of children: Not on file   • Years of education: Not on file   • Highest education level: Not on file   Occupational History   • Not on file   Social Needs   • Financial resource strain: Not on file   • Food insecurity     Worry: Patient refused     Inability: Patient refused   • Transportation needs     Medical: Patient refused     Non-medical: Patient refused   Tobacco Use   • Smoking status: Former Smoker     Packs/day: 0.50     Years: 0.50     Pack years: 0.25     Types: Cigarettes     Quit date: 2012     Years since quittin.6   • Smokeless tobacco: Former User     Quit date: 2012   • Tobacco comment: 1 cig every week or 2 weeks.   Substance and Sexual Activity   • Alcohol use: Not Currently     Comment: not presently   • Drug use: Not Currently     Types: Marijuana, Inhaled     Comment: Last smoked Marijuana    • Sexual activity: Yes     Partners: Male     Birth control/protection: None   Lifestyle   • Physical activity     Days per week: Not on file     Minutes per session: Not on file   • Stress: Not on file   Relationships   • Social connections     Talks on phone: Not on file     Gets together: Not on file     Attends Sikhism service: Not on file     Active member of club or  organization: Not on file     Attends meetings of clubs or organizations: Not on file     Relationship status: Not on file   • Intimate partner violence     Fear of current or ex partner: Not on file     Emotionally abused: Not on file     Physically abused: Not on file     Forced sexual activity: Not on file   Other Topics Concern   • Not on file   Social History Narrative   • Not on file     Family History   Problem Relation Age of Onset   • Arthritis Maternal Grandmother    • Cancer Maternal Grandmother    • Other Mother         NF1   • Migraines Mother    • No Known Problems Sister    • No Known Problems Brother      Past Surgical History:   Procedure Laterality Date   • OTHER      14 y/o endoscopy; GI bleed resulting         Exam:   General : Awake, alert and oriented x 3, Cranial nerves II-XII grossly intact  Abdominal : nontender, soft no rebound or guarding  Pelvic :  Deferred ;  Gravid uterus   FHT by Doppler 160 bpm   Pelvic Support system : normal      Ass: Early IUP           Hyperemesis           Weight lost     Spent 25 minutes with the patient ; Face to Face, with >50% of this time spent in counseling and coordination of care, surrounding the above mentioned issues as well as:Discuss options to [facility] infusion center with IV zofran , vs other RX   P. Phenergan suppositories  25 mg Q 6     Follow up : Return visit in 1 week(s)

## 2020-09-15 NOTE — PROGRESS NOTES
Pt here today to follow up on weight  LMP: 07/18/2020  WT: 119 lb  BP: 106/68  Pt states having a lot N&V, headaches and body weakness. States no other complaints.   Good # [phone]

## 2020-09-28 ENCOUNTER — INITIAL PRENATAL (OUTPATIENT)
Dept: OBGYN | Facility: CLINIC | Age: 30
End: 2020-09-28
Payer: MEDICAID

## 2020-09-28 ENCOUNTER — HOSPITAL ENCOUNTER (OUTPATIENT)
Facility: MEDICAL CENTER | Age: 30
End: 2020-09-28
Attending: ADVANCED PRACTICE MIDWIFE
Payer: MEDICAID

## 2020-09-28 VITALS
WEIGHT: 122 LBS | BODY MASS INDEX: 23.95 KG/M2 | DIASTOLIC BLOOD PRESSURE: 68 MMHG | SYSTOLIC BLOOD PRESSURE: 104 MMHG | HEIGHT: 60 IN

## 2020-09-28 DIAGNOSIS — Q85.01 NEUROFIBROMATOSIS, TYPE 1 (VON RECKLINGHAUSEN'S DISEASE) (HCC): ICD-10-CM

## 2020-09-28 DIAGNOSIS — Z34.91 PRENATAL CARE IN FIRST TRIMESTER: ICD-10-CM

## 2020-09-28 DIAGNOSIS — O21.0 HYPEREMESIS AFFECTING PREGNANCY, ANTEPARTUM: ICD-10-CM

## 2020-09-28 PROBLEM — Z3A.01 LESS THAN 8 WEEKS GESTATION OF PREGNANCY: Status: RESOLVED | Noted: 2020-09-09 | Resolved: 2020-09-28

## 2020-09-28 LAB
APPEARANCE UR: NORMAL
BILIRUB UR STRIP-MCNC: NORMAL MG/DL
COLOR UR AUTO: NORMAL
GLUCOSE UR STRIP.AUTO-MCNC: NEGATIVE MG/DL
KETONES UR STRIP.AUTO-MCNC: NORMAL MG/DL
LEUKOCYTE ESTERASE UR QL STRIP.AUTO: NORMAL
NITRITE UR QL STRIP.AUTO: NEGATIVE
PH UR STRIP.AUTO: 7 [PH] (ref 5–8)
PROT UR QL STRIP: NORMAL MG/DL
RBC UR QL AUTO: NEGATIVE
SP GR UR STRIP.AUTO: 1.02
UROBILINOGEN UR STRIP-MCNC: NORMAL MG/DL

## 2020-09-28 PROCEDURE — 87491 CHLMYD TRACH DNA AMP PROBE: CPT

## 2020-09-28 PROCEDURE — 59402 PR NEW OB HIGH RISK: CPT | Performed by: ADVANCED PRACTICE MIDWIFE

## 2020-09-28 PROCEDURE — 88175 CYTOPATH C/V AUTO FLUID REDO: CPT

## 2020-09-28 PROCEDURE — 87591 N.GONORRHOEAE DNA AMP PROB: CPT

## 2020-09-28 PROCEDURE — 81002 URINALYSIS NONAUTO W/O SCOPE: CPT | Performed by: ADVANCED PRACTICE MIDWIFE

## 2020-09-28 ASSESSMENT — FIBROSIS 4 INDEX: FIB4 SCORE: 0.47

## 2020-09-28 NOTE — PROGRESS NOTES
Risk factors:   Neurfibromatosis, hyperemesis  Referrals made today:   Herbie    Subjective:   Luba Lind is a 30 y.o.  who presents for her new OB exam.  She is 10w4d with an JAZMYNE of Estimated Date of Delivery: 21 by US.   She is feeling well and has no concerns at this time. She reports ER visits or previous care in this pregnancy. Reports absent fetal movement.    Vaginal bleeding no  Pain   no  Cramping  no    Past Medical History:   Diagnosis Date   • Fetal pyelectasis and EIC. negative maternal quad screen. declined amniocentesis 2015   • Headache(784.0)     before and after pregnancy.   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       Psych History   Depression:   no  Anxiety    no  Bipolar    no  Postpartum Mood Changes no    Past Surgical History:   Procedure Laterality Date   • OTHER      14 y/o endoscopy; GI bleed resulting        OB History    Para Term  AB Living   4 3 3     3   SAB TAB Ectopic Molar Multiple Live Births             3      # Outcome Date GA Lbr Anthony/2nd Weight Sex Delivery Anes PTL Lv   4 Current            3 Term 16 38w0d   F    JOSE      Birth Comments: Pt states no complications   2 Term 12 38w2d  3.345 kg (7 lb 6 oz) F Vag-Spont EPI N JOSE   1 Term 01/04/10 40w0d  3.969 kg (8 lb 12 oz) M Vag-Spont EPI  JOSE      Birth Comments: denies complications.        Gynecological History  STIs  no  HSV  no  Abnormal pap no      Family History   Problem Relation Age of Onset   • Arthritis Maternal Grandmother    • Cancer Maternal Grandmother    • Cancer Paternal Grandmother    • Other Mother         NF1   • Migraines Mother    • No Known Problems Sister    • No Known Problems Brother      Denies any genetic disorders in family history.     FOB is involved   Pregnancy is un planned but desired.    She is currently not working and planning to work in 2 weeks. She works at  [employer] .   Denies alcohol use, drug use, or tobacco use in pregnancy.     Denies any current or hx of sexual, emotional or physical abuse or trauma.     Current Medications: PNV, compazine, zofran    Allergies: NKDA      Objective:      Vitals:    09/28/20 1002   BP: 104/68   Weight: 55.3 kg (122 lb)   Height: 1.524 m (5')        See Prenatal Physical and Prenatal Vitals  UA WNL today      Assessment:      1.  IUP @ 10w4d per US      2.  S=D      3.  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  - Perinatology referral completed due to patient history.

## 2020-09-28 NOTE — PROGRESS NOTES
NOB today  LMP 7/18/20  Last pap today  Flu vaccine declined  Phone #[phone]  Pt has bad nausea and would like to address what to do so she can take her PNV  Pt has been in hospital for vomiting. She can not keep fluids done.  No VB no LOF no UC  Pharmacy confirmed  On PNV  Cystic Fibrosis test offered.  Pt needs note to go back to work

## 2020-09-28 NOTE — LETTER
September 28, 2020    To Whom It May Concern:         This is confirmation that Luba Lind attended her scheduled appointment with NITISH Goldsmith,  on 9/28/20. At this time, it is recommended she return to work without general restrictions outside of pregnancy on or after 10/12/20.  She has been referred to high risk doctor and will have multiple upcoming appointments.          If you have any questions please do not hesitate to call me at the phone number listed below.    Sincerely,          NITISH Goldsmith  [phone]

## 2020-09-29 DIAGNOSIS — Z34.91 PRENATAL CARE IN FIRST TRIMESTER: ICD-10-CM

## 2020-09-30 LAB
C TRACH DNA GENITAL QL NAA+PROBE: NEGATIVE
CYTOLOGY REG CYTOL: NORMAL
N GONORRHOEA DNA GENITAL QL NAA+PROBE: NEGATIVE
SPECIMEN SOURCE: NORMAL

## 2020-10-01 ENCOUNTER — HOSPITAL ENCOUNTER (EMERGENCY)
Facility: MEDICAL CENTER | Age: 30
End: 2020-10-01
Attending: EMERGENCY MEDICINE
Payer: MEDICAID

## 2020-10-01 ENCOUNTER — APPOINTMENT (OUTPATIENT)
Dept: RADIOLOGY | Facility: MEDICAL CENTER | Age: 30
End: 2020-10-01
Attending: EMERGENCY MEDICINE
Payer: MEDICAID

## 2020-10-01 VITALS
TEMPERATURE: 98.6 F | BODY MASS INDEX: 23.72 KG/M2 | WEIGHT: 120.81 LBS | OXYGEN SATURATION: 92 % | HEIGHT: 60 IN | DIASTOLIC BLOOD PRESSURE: 80 MMHG | RESPIRATION RATE: 12 BRPM | HEART RATE: 89 BPM | SYSTOLIC BLOOD PRESSURE: 127 MMHG

## 2020-10-01 DIAGNOSIS — N89.8 VAGINAL DISCHARGE: ICD-10-CM

## 2020-10-01 DIAGNOSIS — O20.0 THREATENED MISCARRIAGE: ICD-10-CM

## 2020-10-01 LAB
ACTION RH IMMUNE GLOB 8505RHG: NORMAL
ALBUMIN SERPL BCP-MCNC: 3.8 G/DL (ref 3.2–4.9)
ALBUMIN/GLOB SERPL: 1.4 G/DL
ALP SERPL-CCNC: 65 U/L (ref 30–99)
ALT SERPL-CCNC: 5 U/L (ref 2–50)
ANION GAP SERPL CALC-SCNC: 11 MMOL/L (ref 7–16)
APPEARANCE UR: ABNORMAL
AST SERPL-CCNC: 8 U/L (ref 12–45)
B-HCG SERPL-ACNC: ABNORMAL MIU/ML (ref 0–5)
BACTERIA #/AREA URNS HPF: ABNORMAL /HPF
BASOPHILS # BLD AUTO: 0.4 % (ref 0–1.8)
BASOPHILS # BLD: 0.03 K/UL (ref 0–0.12)
BILIRUB SERPL-MCNC: 0.2 MG/DL (ref 0.1–1.5)
BILIRUB UR QL STRIP.AUTO: NEGATIVE
BUN SERPL-MCNC: 5 MG/DL (ref 8–22)
CALCIUM SERPL-MCNC: 9.8 MG/DL (ref 8.5–10.5)
CANDIDA DNA VAG QL PROBE+SIG AMP: POSITIVE
CHLORIDE SERPL-SCNC: 100 MMOL/L (ref 96–112)
CO2 SERPL-SCNC: 22 MMOL/L (ref 20–33)
COLOR UR: YELLOW
CREAT SERPL-MCNC: 0.28 MG/DL (ref 0.5–1.4)
EOSINOPHIL # BLD AUTO: 0.1 K/UL (ref 0–0.51)
EOSINOPHIL NFR BLD: 1.4 % (ref 0–6.9)
EPI CELLS #/AREA URNS HPF: ABNORMAL /HPF
ERYTHROCYTE [DISTWIDTH] IN BLOOD BY AUTOMATED COUNT: 43.9 FL (ref 35.9–50)
G VAGINALIS DNA VAG QL PROBE+SIG AMP: NEGATIVE
GLOBULIN SER CALC-MCNC: 2.8 G/DL (ref 1.9–3.5)
GLUCOSE SERPL-MCNC: 88 MG/DL (ref 65–99)
GLUCOSE UR STRIP.AUTO-MCNC: NEGATIVE MG/DL
HCT VFR BLD AUTO: 37.5 % (ref 37–47)
HGB BLD-MCNC: 12.3 G/DL (ref 12–16)
HYALINE CASTS #/AREA URNS LPF: ABNORMAL /LPF
IMM GRANULOCYTES # BLD AUTO: 0.02 K/UL (ref 0–0.11)
IMM GRANULOCYTES NFR BLD AUTO: 0.3 % (ref 0–0.9)
KETONES UR STRIP.AUTO-MCNC: NEGATIVE MG/DL
LEUKOCYTE ESTERASE UR QL STRIP.AUTO: ABNORMAL
LYMPHOCYTES # BLD AUTO: 1.44 K/UL (ref 1–4.8)
LYMPHOCYTES NFR BLD: 20.7 % (ref 22–41)
MCH RBC QN AUTO: 26.2 PG (ref 27–33)
MCHC RBC AUTO-ENTMCNC: 32.8 G/DL (ref 33.6–35)
MCV RBC AUTO: 79.8 FL (ref 81.4–97.8)
MICRO URNS: ABNORMAL
MONOCYTES # BLD AUTO: 0.38 K/UL (ref 0–0.85)
MONOCYTES NFR BLD AUTO: 5.5 % (ref 0–13.4)
NEUTROPHILS # BLD AUTO: 4.97 K/UL (ref 2–7.15)
NEUTROPHILS NFR BLD: 71.7 % (ref 44–72)
NITRITE UR QL STRIP.AUTO: NEGATIVE
NRBC # BLD AUTO: 0 K/UL
NRBC BLD-RTO: 0 /100 WBC
NUMBER OF RH DOSES IND 8505RD: 1
PH UR STRIP.AUTO: >=9 [PH] (ref 5–8)
PLATELET # BLD AUTO: 266 K/UL (ref 164–446)
PMV BLD AUTO: 10 FL (ref 9–12.9)
POTASSIUM SERPL-SCNC: 3.9 MMOL/L (ref 3.6–5.5)
PROT SERPL-MCNC: 6.6 G/DL (ref 6–8.2)
PROT UR QL SSA: NEGATIVE MG/DL
RBC # BLD AUTO: 4.7 M/UL (ref 4.2–5.4)
RBC # URNS HPF: ABNORMAL /HPF
RBC UR QL AUTO: NEGATIVE
RH BLD: NORMAL
SODIUM SERPL-SCNC: 133 MMOL/L (ref 135–145)
SP GR UR STRIP.AUTO: 1.02
T VAGINALIS DNA VAG QL PROBE+SIG AMP: NEGATIVE
UROBILINOGEN UR STRIP.AUTO-MCNC: 1 MG/DL
WBC # BLD AUTO: 6.9 K/UL (ref 4.8–10.8)
WBC #/AREA URNS HPF: ABNORMAL /HPF

## 2020-10-01 PROCEDURE — 81001 URINALYSIS AUTO W/SCOPE: CPT

## 2020-10-01 PROCEDURE — 87491 CHLMYD TRACH DNA AMP PROBE: CPT

## 2020-10-01 PROCEDURE — 86901 BLOOD TYPING SEROLOGIC RH(D): CPT

## 2020-10-01 PROCEDURE — 87480 CANDIDA DNA DIR PROBE: CPT

## 2020-10-01 PROCEDURE — 76801 OB US < 14 WKS SINGLE FETUS: CPT

## 2020-10-01 PROCEDURE — 85025 COMPLETE CBC W/AUTO DIFF WBC: CPT

## 2020-10-01 PROCEDURE — 87510 GARDNER VAG DNA DIR PROBE: CPT

## 2020-10-01 PROCEDURE — 84702 CHORIONIC GONADOTROPIN TEST: CPT

## 2020-10-01 PROCEDURE — 87591 N.GONORRHOEAE DNA AMP PROB: CPT

## 2020-10-01 PROCEDURE — 96374 THER/PROPH/DIAG INJ IV PUSH: CPT

## 2020-10-01 PROCEDURE — 99284 EMERGENCY DEPT VISIT MOD MDM: CPT

## 2020-10-01 PROCEDURE — 80053 COMPREHEN METABOLIC PANEL: CPT

## 2020-10-01 PROCEDURE — 87660 TRICHOMONAS VAGIN DIR PROBE: CPT

## 2020-10-01 ASSESSMENT — FIBROSIS 4 INDEX: FIB4 SCORE: 0.47

## 2020-10-01 ASSESSMENT — ENCOUNTER SYMPTOMS: ABDOMINAL PAIN: 1

## 2020-10-01 NOTE — ED TRIAGE NOTES
Chief Complaint   Patient presents with   • Abdominal Cramping     x2 days   • Vaginal Discharge     x3 days; white and brown discharge; pt denies nando blood     Pt ambulatory to triage for above complaint. Pt states she saw OB/GYN on  and had pap smear done. Pt is  and she denies having these symptoms with previous pregnancies.    Pt is alert/oriented and follows commands. Pt speaking in full sentences and responds appropriately to questions. No acute distress noted in triage and respirations are even and unlabored.     Pt placed in lobby and educated on triage process. Pt encouraged to alert staff for any changes in condition.

## 2020-10-02 DIAGNOSIS — N76.0 ACUTE VAGINITIS: ICD-10-CM

## 2020-10-02 LAB
C TRACH DNA SPEC QL NAA+PROBE: NEGATIVE
N GONORRHOEA DNA SPEC QL NAA+PROBE: NEGATIVE
SPECIMEN SOURCE: NORMAL

## 2020-10-02 NOTE — ED NOTES
Pt medicated per MAR. Understanding of DC paperwork. Iv removed. Bleeding controlled. Steady gait out of ER. All belongings with pt

## 2020-10-02 NOTE — ED PROVIDER NOTES
ED Provider Note    Scribed for Jone Marítnez M.D. by Aiden Camilo. 10/1/2020, 5:19 PM.    Primary care provider: ALLEN Kay  Means of arrival: Walk-in  History obtained from: Patient  History limited by: None    CHIEF COMPLAINT  Chief Complaint   Patient presents with   • Abdominal Cramping     x2 days   • Vaginal Discharge     x3 days; white and brown discharge; pt denies nando blood       HPI  Luba Lind is a 30 y.o.  10-weeks pregnant female who presents to the Emergency Department for acute, intermittent abdominal cramping and abnormal vaginal discharge onset 3 days ago. Patient reports that 3 days ago she began to noticed brown and white vaginal discharge without blood. She additionally has experiencing intermittent abdominal cramping and low back pain. There are no known alleviating or exacerbating factors. Patient states that she saw her OB/GYN on  and had a pap smear done.     REVIEW OF SYSTEMS  Review of Systems   Gastrointestinal: Positive for abdominal pain.   Genitourinary:        Positive for abnormal vaginal discharge.  Negative for abnormal vaginal bleeding.    All other systems reviewed and are negative.      PAST MEDICAL HISTORY   has a past medical history of Fetal pyelectasis and EIC. negative maternal quad screen. declined amniocentesis (2015), Headache(784.0), Hyperemesis affecting pregnancy, antepartum (2020), Migraine, and NF2 (neurofibromatosis 2) (HCC).    SURGICAL HISTORY   has a past surgical history that includes other.    SOCIAL HISTORY  Social History     Tobacco Use   • Smoking status: Former Smoker     Packs/day: 0.00     Years: 0.00     Pack years: 0.00     Types: Cigarettes     Quit date: 2012     Years since quittin.7   • Smokeless tobacco: Never Used   • Tobacco comment: 1 cig every week or 2 weeks.   Substance Use Topics   • Alcohol use: Not Currently     Comment: 1-2x/month prior to current pregnancy   • Drug use: Not  Currently     Types: Marijuana, Inhaled     Comment: Last smoked Marijuana 08/20      Social History     Substance and Sexual Activity   Drug Use Not Currently   • Types: Marijuana, Inhaled    Comment: Last smoked Marijuana 08/20       FAMILY HISTORY  Family History   Problem Relation Age of Onset   • Arthritis Maternal Grandmother    • Cancer Maternal Grandmother    • Cancer Paternal Grandmother    • Other Mother         NF1   • Migraines Mother    • No Known Problems Sister    • No Known Problems Brother        CURRENT MEDICATIONS  Current Outpatient Medications   Medication Instructions   • ondansetron (ZOFRAN ODT) 4 mg, Oral, EVERY 8 HOURS PRN   • prochlorperazine (COMPAZINE) 5 mg, Oral, EVERY 6 HOURS PRN         ALLERGIES  No Known Allergies    PHYSICAL EXAM  VITAL SIGNS: /71   Pulse 89   Temp 37 °C (98.6 °F) (Temporal)   Resp 12   Ht 1.524 m (5')   Wt 54.8 kg (120 lb 13 oz)   LMP 07/18/2020 (Exact Date)   SpO2 92%   BMI 23.59 kg/m²     Constitutional:   No acute distress  HENT:  Moist mucous membranes  Eyes:  No conjunctivitis or icterus  Neck:  trachea is midline, no palpable thyroid  Lymphatic:  No cervical lymphadenopathy  Cardiovascular:  Regular rate and rhythm, no murmurs  Thorax & Lungs:  Normal breath sounds, no rhonchi  Abdomen:  Soft, Non-tender  Pelvic: Normal external female genitalia she has whitish thick discharge in the vault there is no cervical motion tenderness GC chlamydia DNA probe were obtained  Skin:.  no rash  Back:  Non-tender, no CVA tenderness  Extremities:   no edema  Vascular:  symmetric radial pulse  Neurologic:  Normal gross motor    LABS  Labs Reviewed   CBC WITH DIFFERENTIAL - Abnormal; Notable for the following components:       Result Value    MCV 79.8 (*)     MCH 26.2 (*)     MCHC 32.8 (*)     Lymphocytes 20.70 (*)     All other components within normal limits   COMP METABOLIC PANEL - Abnormal; Notable for the following components:    Sodium 133 (*)     Bun 5  (*)     Creatinine 0.28 (*)     AST(SGOT) 8 (*)     All other components within normal limits   HCG QUANTITATIVE - Abnormal; Notable for the following components:    Bhcg 17512.0 (*)     All other components within normal limits   URINALYSIS,CULTURE IF INDICATED - Abnormal; Notable for the following components:    Character Cloudy (*)     Ph >=9.0 (*)     Leukocyte Esterase Small (*)     All other components within normal limits   URINE MICROSCOPIC (W/UA) - Abnormal; Notable for the following components:    WBC 5-10 (*)     Bacteria Few (*)     Epithelial Cells Moderate (*)     Hyaline Cast 6-10 (*)     All other components within normal limits   VAGINAL PATHOGENS DNA PANEL - Abnormal; Notable for the following components:    Candida species DNA Probe POSITIVE (*)     All other components within normal limits   RH TYPE FOR RHOGAM FROM E.D.    Narrative:     Print Consent?->No   UR PROTEIN SSA   ACTION: RH IMMUNE GLOBULIN    Narrative:     Print Consent?->No   ESTIMATED GFR   RH TYPE FOR RHOGAM FROM E.D.    Narrative:     Print Consent?->No   CHLAMYDIA/GC PCR URINE OR SWAB     All labs reviewed by me.    RADIOLOGY  US-OB 1ST TRIMESTER SINGLE GEST Is the patient pregnant? Yes   Final Result      1.  Viable single intrauterine gestation of an estimated gestational age of 10 weeks, 3 days.   2.  No explanation for vaginal bleeding.        The radiologist's interpretation of all radiological studies have been reviewed by me.    COURSE & MEDICAL DECISION MAKING  Pertinent Labs & Imaging studies reviewed. (See chart for details)    5:19 PM - Patient seen and examined at bedside. Ordered US-OB 1st Trimester, action: RH immun globulin, estimated GFR, CBC w/ diff, CMP, Rh type for Rhogam, HCG quant, UA w/ culture, and UR protein SSA to evaluate her symptoms. The differential diagnoses include but are not limited to: Ectopic pregnancy threatened AB    6:35 PM - Patient treated with Rhophylac. Vaginal swab obtained.     7:42 PM   - Patient was reevaluated at bedside. Discussed lab and radiology results with the patient and informed them they are reassuring. Return precautions were discussed with the patient, and they were cleared for discharge at this time. Patient was understanding and agreeable to discharge.     Medical Decision Making:   The patient's lab comes back showing normal CBC CMP contaminated urine.  She has a ultrasound confirming a viable intrauterine pregnancy.    Pelvic exam did not show any bleeding showed some whitish discharge and GC chlamydia DNA probe were obtained along with the vaginal pathogens DNA panel.  Patient did require RhoGam because she is Rh-.  She did not want a wait for the vaginal pathogen panel.  This is not a critical results I did discharge the patient and she will follow-up with her OB doctor.  It did turn out to have candidiasis.  She was discharged with return precautions and follow-up    The patient will return for new or worsening symptoms and is stable at the time of discharge.    The patient is referred to a primary physician for blood pressure management, diabetic screening, and for all other preventative health concerns.    DISPOSITION:  Patient will be discharged home in stable condition.    FOLLOW UP:  ALLEN Kay  [facility] [address] [address]  [phone]            OUTPATIENT MEDICATIONS:  Discharge Medication List as of 10/1/2020  7:41 PM          FINAL IMPRESSION  1. Vaginal discharge    2. Threatened miscarriage          Aiden HARRIS (Deandre), am scribing for, and in the presence of, Jone Martínez M.D..    Electronically signed by: Aiden Camilo (Deandre), 10/1/2020    Jone HARRIS M.D. personally performed the services described in this documentation, as scribed by Aiden Camilo in my presence, and it is both accurate and complete. C.    The note accurately reflects work and decisions made by me.  Jone Martínez M.D.  10/1/2020  10:54  PM

## 2020-10-05 ENCOUNTER — ROUTINE PRENATAL (OUTPATIENT)
Dept: OBGYN | Facility: CLINIC | Age: 30
End: 2020-10-05
Payer: MEDICAID

## 2020-10-05 VITALS — WEIGHT: 124.3 LBS | SYSTOLIC BLOOD PRESSURE: 110 MMHG | DIASTOLIC BLOOD PRESSURE: 62 MMHG | BODY MASS INDEX: 24.28 KG/M2

## 2020-10-05 DIAGNOSIS — B37.31 YEAST INFECTION INVOLVING THE VAGINA AND SURROUNDING AREA: ICD-10-CM

## 2020-10-05 PROCEDURE — 99213 OFFICE O/P EST LOW 20 MIN: CPT | Performed by: OBSTETRICS & GYNECOLOGY

## 2020-10-05 ASSESSMENT — FIBROSIS 4 INDEX: FIB4 SCORE: 0.42

## 2020-10-05 NOTE — PROGRESS NOTES
DOCUMENTATION ONLY:  [employer] Assistance Approved  [employer] [facility]  Approval Dates: 8/16/17-12/31/17  Case ID#: [id number]  Ph: [phone]  Fx: [phone]     Pt. Here for OB/FU.   Boris # [phone]  Pt. Denies VB, LOF, or UC's.   Pharmacy verified.   Chaperone offered and not indicated  Pt was seen at the ER on 10/01/2020 for cramping and vaginal dishcarge

## 2020-10-05 NOTE — PROGRESS NOTES
Chief complaint: Follow-up ER visit      History of present illness: 30 y.o.  4 para 3-0-0-3 11 weeks 4-day gestational age presents to office for follow-up ER visit.  Patient was seen the emergency room for vaginal bleeding, cramping and pelvic pain.  Diagnosed with yeast infection and started on fluconazole cream.    Patient reports be doing much better.  No further vaginal bleeding.  Cramping has greatly improved.  No loss of fluid.    Patient was affected by nausea vomiting early pregnancy, this has now greatly improved and she is able to tolerate oral intake      Review of systems:  Pertinent positives documented in HPI and a comprehensive review of system is negative    All PMH, PSH, allergies, social history and FH reviewed and updated today:  Past Medical History:   Diagnosis Date   • Fetal pyelectasis and EIC. negative maternal quad screen. declined amniocentesis 2015   • Headache(784.0)     before and after pregnancy.   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       Past Surgical History:   Procedure Laterality Date   • OTHER      14 y/o endoscopy; GI bleed resulting       Allergies: No Known Allergies    Social History     Socioeconomic History   • Marital status: Single     Spouse name: Not on file   • Number of children: Not on file   • Years of education: Not on file   • Highest education level: Not on file   Occupational History   • Not on file   Social Needs   • Financial resource strain: Not on file   • Food insecurity     Worry: Patient refused     Inability: Patient refused   • Transportation needs     Medical: Patient refused     Non-medical: Patient refused   Tobacco Use   • Smoking status: Former Smoker     Packs/day: 0.00     Years: 0.00     Pack years: 0.00     Types: Cigarettes     Quit date: 2012     Years since quittin.7   • Smokeless tobacco: Never Used   • Tobacco comment: 1 cig every  week or 2 weeks.   Substance and Sexual Activity   • Alcohol use: Not Currently     Comment: 1-2x/month prior to current pregnancy   • Drug use: Not Currently     Types: Marijuana, Inhaled     Comment: Last smoked Marijuana 08/20   • Sexual activity: Yes     Partners: Male     Birth control/protection: None   Lifestyle   • Physical activity     Days per week: Not on file     Minutes per session: Not on file   • Stress: Not on file   Relationships   • Social connections     Talks on phone: Not on file     Gets together: Not on file     Attends Denominational service: Not on file     Active member of club or organization: Not on file     Attends meetings of clubs or organizations: Not on file     Relationship status: Not on file   • Intimate partner violence     Fear of current or ex partner: Not on file     Emotionally abused: Not on file     Physically abused: Not on file     Forced sexual activity: Not on file   Other Topics Concern   • Not on file   Social History Narrative   • Not on file       Family History   Problem Relation Age of Onset   • Arthritis Maternal Grandmother    • Cancer Maternal Grandmother    • Cancer Paternal Grandmother    • Other Mother         NF1   • Migraines Mother    • No Known Problems Sister    • No Known Problems Brother        Physical exam:  /62   Wt 56.4 kg (124 lb 4.8 oz)     GENERAL APPEARANCE: healthy, alert, no distress  ABDOMEN Abdomen soft, non-tender. BS normal. No masses,  No organomegaly  FEMALE GYN: Deferred   NEURO Awake, alert and oriented x 3, Normal gait, no sensory deficits  SKIN No rashes, or ulcers or lesions seen  PSYCHIATRIC: Patient shows appropriate affect, is alert and oriented x3, intact judgment and insight.      Assessment:  1. Yeast infection involving the vagina and surrounding area         Plan:    Fetal heart tones approximate 160 bpm.  Cervix closed on ultrasound examination.    Reassurance given.  Continue with fluconazole treatment.    She has new  OB visit scheduled for later this month      Questions answered    Spent  15 minutes in face-to-face patient contact in which greater than 50% of that visit was spent in counseling/coordination of care including medical and surgical options of care.

## 2020-10-09 ENCOUNTER — TELEPHONE (OUTPATIENT)
Dept: OBGYN | Facility: CLINIC | Age: 30
End: 2020-10-09

## 2020-10-09 NOTE — TELEPHONE ENCOUNTER
Returned pt's phone call. No answer. Left VM for pt to call us back.  (pt left MV on 10/07/20 stating having headaches and wants to know if it's normal and what she can take)

## 2020-10-14 ENCOUNTER — TELEPHONE (OUTPATIENT)
Dept: OBGYN | Facility: CLINIC | Age: 30
End: 2020-10-14

## 2020-10-14 ENCOUNTER — TELEPHONE (OUTPATIENT)
Dept: OBGYN | Facility: MEDICAL CENTER | Age: 30
End: 2020-10-14

## 2020-10-14 DIAGNOSIS — G43.001 MIGRAINE WITHOUT AURA AND WITH STATUS MIGRAINOSUS, NOT INTRACTABLE: ICD-10-CM

## 2020-10-14 DIAGNOSIS — G43.009 MIGRAINE WITHOUT AURA AND WITHOUT STATUS MIGRAINOSUS, NOT INTRACTABLE: ICD-10-CM

## 2020-10-14 RX ORDER — BUTALBITAL, ACETAMINOPHEN AND CAFFEINE 300; 40; 50 MG/1; MG/1; MG/1
1 CAPSULE ORAL EVERY 4 HOURS PRN
Qty: 30 CAP | Refills: 1 | Status: SHIPPED | OUTPATIENT
Start: 2020-10-14 | End: 2020-11-13

## 2020-10-14 NOTE — TELEPHONE ENCOUNTER
Patient called in stating that she has been having very bad migraines for the last 2 weeks and nothing seems to be helping. Migraines focused on the right side. I consulted with a provider who states to check how much caffeine she is taking and her fluid intake as well. Patient states that even before she was pregnant she didn't use caffeine products, and that she had upped her fluid intake but could still be doing better. Educated patient on recommended fluid intake. I consulted with provider again who states that she will put in a Rx for Fioricet, and that she needs a referral to neurology. Patient instructed on Rx and referral and will come in today to pick her Rx up at the . No further questions.

## 2020-10-15 NOTE — TELEPHONE ENCOUNTER
Called by Dr. Stoner after evaluating patient today.  Concerned that patient's headache could be due to neurofibromatosis causing vascular changes in the brain.  Also concerned about possible renal artery stenosis.  He is recommending the patient go to the emergency department to have an MRI, MRA of the brain without contrast.  He also would like a renal artery ultrasound.    Emergency department physician was contacted and told to expect patient and the recommended tests to be performed for the patient.    Tracy Mclean D.O.

## 2020-10-27 ENCOUNTER — ROUTINE PRENATAL (OUTPATIENT)
Dept: OBGYN | Facility: CLINIC | Age: 30
End: 2020-10-27
Payer: MEDICAID

## 2020-10-27 VITALS — BODY MASS INDEX: 25.49 KG/M2 | DIASTOLIC BLOOD PRESSURE: 56 MMHG | SYSTOLIC BLOOD PRESSURE: 104 MMHG | WEIGHT: 130.5 LBS

## 2020-10-27 DIAGNOSIS — Z3A.14 14 WEEKS GESTATION OF PREGNANCY: ICD-10-CM

## 2020-10-27 DIAGNOSIS — Q85.01 NEUROFIBROMATOSIS, TYPE 1 (VON RECKLINGHAUSEN'S DISEASE) (HCC): ICD-10-CM

## 2020-10-27 PROBLEM — Z3A.17 17 WEEKS GESTATION OF PREGNANCY: Status: ACTIVE | Noted: 2020-10-27

## 2020-10-27 PROBLEM — Z3A.17 17 WEEKS GESTATION OF PREGNANCY: Status: RESOLVED | Noted: 2020-10-27 | Resolved: 2020-10-27

## 2020-10-27 PROCEDURE — 90040 PR PRENATAL FOLLOW UP: CPT | Performed by: OBSTETRICS & GYNECOLOGY

## 2020-10-27 ASSESSMENT — FIBROSIS 4 INDEX: FIB4 SCORE: 0.42

## 2020-10-27 NOTE — PROGRESS NOTES
Pt. Here for OB/FU.   Good # [phone]  Pt. Denies VB, LOF, or UC's.   Pharmacy verified.   Chaperone offered and not indicated  Pt states no complaints or concerns today. Reprinted labs for patient, pt will get those done as soon as possible. Next appointment with Dr. Stoner is December 2, 2020

## 2020-10-27 NOTE — PROGRESS NOTES
Chief complaint: Return visit    S: Pt presents for routine OB follow up.  No contractions, vaginal bleeding, or leaking fluids. Good fetal movement.    Was seen by perinatology.  Consult in media    Patient had NIPT performed, results not available yet.  Per patient report, no abnormalities    Questions answered.    O: /56   Wt 59.2 kg (130 lb 8 oz)   LMP 2020 (Exact Date)   BMI 25.49 kg/m²   Patients' weight gain, fluid intake and exercise level discussed.  Vitals, fundal height , fetal position, and FHR reviewed on flowsheet    Lab:No results found for this or any previous visit (from the past 336 hour(s)).    A/P:  30 y.o.  at 14w5d presents for routine obstetric follow-up.  Size equals dates and/or scan    1.  Continue prenatal vitamins.  2.  Begin kick counts at 28 weeks.  3.  Exercise at least 30 minutes daily.  4.  Drink at least 2 Liters of water daily  5.  Follow-up in 4 weeks.    MS-AFP ordered today.    Follow-up with perinatology scheduled in approximately 5 to 6 weeks for anatomical ultrasound    Patient has not had prenatal labs performed, reports she will have them drawn as soon as possible.    All questions answered

## 2020-11-05 ENCOUNTER — HOSPITAL ENCOUNTER (OUTPATIENT)
Dept: LAB | Facility: MEDICAL CENTER | Age: 30
End: 2020-11-05
Attending: OBSTETRICS & GYNECOLOGY
Payer: MEDICAID

## 2020-11-05 ENCOUNTER — HOSPITAL ENCOUNTER (OUTPATIENT)
Dept: LAB | Facility: MEDICAL CENTER | Age: 30
End: 2020-11-05
Attending: ADVANCED PRACTICE MIDWIFE
Payer: MEDICAID

## 2020-11-05 DIAGNOSIS — Z34.91 PRENATAL CARE IN FIRST TRIMESTER: ICD-10-CM

## 2020-11-05 DIAGNOSIS — Z3A.14 14 WEEKS GESTATION OF PREGNANCY: ICD-10-CM

## 2020-11-05 LAB
ABO GROUP BLD: ABNORMAL
AMORPH CRY #/AREA URNS HPF: PRESENT /HPF
BACTERIA #/AREA URNS HPF: NEGATIVE /HPF
BLD GP AB SCN SERPL QL: ABNORMAL
EPI CELLS #/AREA URNS HPF: NORMAL /HPF
HYALINE CASTS #/AREA URNS LPF: NORMAL /LPF
RBC # URNS HPF: NORMAL /HPF
RH BLD: ABNORMAL
WBC #/AREA URNS HPF: NORMAL /HPF

## 2020-11-05 PROCEDURE — 86900 BLOOD TYPING SEROLOGIC ABO: CPT

## 2020-11-05 PROCEDURE — 86850 RBC ANTIBODY SCREEN: CPT

## 2020-11-05 PROCEDURE — 86803 HEPATITIS C AB TEST: CPT

## 2020-11-05 PROCEDURE — 82105 ALPHA-FETOPROTEIN SERUM: CPT

## 2020-11-05 PROCEDURE — 87389 HIV-1 AG W/HIV-1&-2 AB AG IA: CPT

## 2020-11-05 PROCEDURE — G0480 DRUG TEST DEF 1-7 CLASSES: HCPCS

## 2020-11-05 PROCEDURE — 86901 BLOOD TYPING SEROLOGIC RH(D): CPT

## 2020-11-05 PROCEDURE — 81001 URINALYSIS AUTO W/SCOPE: CPT

## 2020-11-05 PROCEDURE — 86870 RBC ANTIBODY IDENTIFICATION: CPT

## 2020-11-05 PROCEDURE — 87340 HEPATITIS B SURFACE AG IA: CPT

## 2020-11-05 PROCEDURE — 86780 TREPONEMA PALLIDUM: CPT

## 2020-11-05 PROCEDURE — 80307 DRUG TEST PRSMV CHEM ANLYZR: CPT

## 2020-11-05 PROCEDURE — 85025 COMPLETE CBC W/AUTO DIFF WBC: CPT

## 2020-11-05 PROCEDURE — 86762 RUBELLA ANTIBODY: CPT

## 2020-11-05 PROCEDURE — 36415 COLL VENOUS BLD VENIPUNCTURE: CPT

## 2020-11-06 LAB
APPEARANCE UR: ABNORMAL
BASOPHILS # BLD AUTO: 0.6 % (ref 0–1.8)
BASOPHILS # BLD: 0.04 K/UL (ref 0–0.12)
BILIRUB UR QL STRIP.AUTO: NEGATIVE
BLD GP AB INVEST PLASRBC-IMP: ABNORMAL
COLOR UR: YELLOW
EOSINOPHIL # BLD AUTO: 0.04 K/UL (ref 0–0.51)
EOSINOPHIL NFR BLD: 0.6 % (ref 0–6.9)
ERYTHROCYTE [DISTWIDTH] IN BLOOD BY AUTOMATED COUNT: 44.9 FL (ref 35.9–50)
GLUCOSE UR STRIP.AUTO-MCNC: NEGATIVE MG/DL
HBV SURFACE AG SER QL: ABNORMAL
HCT VFR BLD AUTO: 35.8 % (ref 37–47)
HCV AB SER QL: NORMAL
HGB BLD-MCNC: 11.6 G/DL (ref 12–16)
HIV 1+2 AB+HIV1 P24 AG SERPL QL IA: NORMAL
IMM GRANULOCYTES # BLD AUTO: 0.06 K/UL (ref 0–0.11)
IMM GRANULOCYTES NFR BLD AUTO: 0.9 % (ref 0–0.9)
KETONES UR STRIP.AUTO-MCNC: 15 MG/DL
LEUKOCYTE ESTERASE UR QL STRIP.AUTO: NEGATIVE
LYMPHOCYTES # BLD AUTO: 1.06 K/UL (ref 1–4.8)
LYMPHOCYTES NFR BLD: 15.1 % (ref 22–41)
MCH RBC QN AUTO: 26.5 PG (ref 27–33)
MCHC RBC AUTO-ENTMCNC: 32.4 G/DL (ref 33.6–35)
MCV RBC AUTO: 81.7 FL (ref 81.4–97.8)
MICRO URNS: ABNORMAL
MONOCYTES # BLD AUTO: 0.43 K/UL (ref 0–0.85)
MONOCYTES NFR BLD AUTO: 6.1 % (ref 0–13.4)
NEUTROPHILS # BLD AUTO: 5.39 K/UL (ref 2–7.15)
NEUTROPHILS NFR BLD: 76.7 % (ref 44–72)
NITRITE UR QL STRIP.AUTO: NEGATIVE
NRBC # BLD AUTO: 0 K/UL
NRBC BLD-RTO: 0 /100 WBC
PH UR STRIP.AUTO: 8 [PH] (ref 5–8)
PLATELET # BLD AUTO: 243 K/UL (ref 164–446)
PMV BLD AUTO: 10.5 FL (ref 9–12.9)
PROT UR QL STRIP: NEGATIVE MG/DL
RBC # BLD AUTO: 4.38 M/UL (ref 4.2–5.4)
RBC UR QL AUTO: NEGATIVE
RUBV AB SER QL: 133 IU/ML
SP GR UR STRIP.AUTO: 1.02
TREPONEMA PALLIDUM IGG+IGM AB [PRESENCE] IN SERUM OR PLASMA BY IMMUNOASSAY: ABNORMAL
UROBILINOGEN UR STRIP.AUTO-MCNC: 0.2 MG/DL
WBC # BLD AUTO: 7 K/UL (ref 4.8–10.8)

## 2020-11-06 PROCEDURE — 86870 RBC ANTIBODY IDENTIFICATION: CPT

## 2020-11-07 LAB
AMPHET CTO UR CFM-MCNC: NEGATIVE NG/ML
BARBITURATES CTO UR CFM-MCNC: POSITIVE NG/ML
BENZODIAZ CTO UR CFM-MCNC: NEGATIVE NG/ML
CANNABINOIDS CTO UR CFM-MCNC: NEGATIVE NG/ML
COCAINE CTO UR CFM-MCNC: NEGATIVE NG/ML
DRUG COMMENT 753798: NORMAL
METHADONE CTO UR CFM-MCNC: NEGATIVE NG/ML
OPIATES CTO UR CFM-MCNC: NEGATIVE NG/ML
PCP CTO UR CFM-MCNC: NEGATIVE NG/ML
PROPOXYPH CTO UR CFM-MCNC: NEGATIVE NG/ML

## 2020-11-08 PROBLEM — R82.5 POSITIVE URINE DRUG SCREEN: Status: ACTIVE | Noted: 2020-11-08

## 2020-11-10 LAB
AMOBARBITAL UR-MCNC: <50 NG/ML
BUTALBITAL UR-MCNC: 1094 NG/ML
PENTOBARB UR-MCNC: <50 NG/ML
PHENOBARB UR-MCNC: <50 NG/ML
SECOBARBITAL UR-MCNC: <50 NG/ML

## 2020-11-11 LAB
# FETUSES US: NORMAL
AFP MOM SERPL: 1.42
AFP SERPL-MCNC: 51 NG/ML
AGE - REPORTED: 31.7 YR
CURRENT SMOKER: NO
FAMILY MEMBER DISEASES HX: NO
GA METHOD: NORMAL
GA: NORMAL WK
IDDM PATIENT QL: NO
INTEGRATED SCN PATIENT-IMP: NORMAL
SPECIMEN DRAWN SERPL: NORMAL

## 2020-11-16 ENCOUNTER — HOSPITAL ENCOUNTER (EMERGENCY)
Facility: MEDICAL CENTER | Age: 30
End: 2020-11-16
Payer: MEDICAID

## 2020-11-16 ENCOUNTER — ROUTINE PRENATAL (OUTPATIENT)
Dept: OBGYN | Facility: CLINIC | Age: 30
End: 2020-11-16
Payer: MEDICAID

## 2020-11-16 VITALS
WEIGHT: 134.26 LBS | RESPIRATION RATE: 16 BRPM | SYSTOLIC BLOOD PRESSURE: 111 MMHG | OXYGEN SATURATION: 97 % | DIASTOLIC BLOOD PRESSURE: 80 MMHG | TEMPERATURE: 97.4 F | BODY MASS INDEX: 26.36 KG/M2 | HEART RATE: 105 BPM | HEIGHT: 60 IN

## 2020-11-16 VITALS — SYSTOLIC BLOOD PRESSURE: 94 MMHG | BODY MASS INDEX: 26.37 KG/M2 | DIASTOLIC BLOOD PRESSURE: 58 MMHG | WEIGHT: 135 LBS

## 2020-11-16 DIAGNOSIS — Z34.82 ENCOUNTER FOR SUPERVISION OF OTHER NORMAL PREGNANCY IN SECOND TRIMESTER: ICD-10-CM

## 2020-11-16 PROCEDURE — 90040 PR PRENATAL FOLLOW UP: CPT | Performed by: OBSTETRICS & GYNECOLOGY

## 2020-11-16 PROCEDURE — 302449 STATCHG TRIAGE ONLY (STATISTIC)

## 2020-11-16 ASSESSMENT — FIBROSIS 4 INDEX
FIB4 SCORE: 0.46
FIB4 SCORE: 0.46

## 2020-11-16 NOTE — PROGRESS NOTES
30 y.o.  17w4d presents with complaints of left-sided pelvic pain.  She states the pain started on Saturday, and has been constant, 7 out of 10 intensity.  She states it is worse when she sits, walks, or lays down.  Pain is worse with position change.  She states sometimes it radiates to her back.  She has not tried anything for the pain.  She is not feeling movement yet.  She denies cramping or vaginal bleeding.  Denies vomiting.  Last bowel movement was this morning and normal in nature.  She denies fevers, chills, dysuria, hematuria, hematochezia.  Denies headache.  She was at the emergency room earlier this morning, but left due to a long wait.    The patient's pregnancy is complicated by   Patient Active Problem List    Diagnosis Date Noted   • Hyperemesis affecting pregnancy, antepartum 2020     Priority: High   • Positive urine drug screen 2020   • Migraine without aura or status migrainosus 10/14/2020   • Yeast infection involving the vagina and surrounding area 10/05/2020   • Hyponatremia 2020   • Neurofibromatosis, type 1 (von Recklinghausen's disease) ([facility]) 2015   • Rh negative, Rhogham received 10/1/20 +antibody screen 2012   General -alert, in no apparent distress, nontoxic-appearing.  Back -no CVA tenderness  Abdomen -soft, nontender, nondistended.  There is no rebound or guarding.    Left lower quadrant pain -unclear etiology.  Discussed the possibility of round ligament pain versus potential stone.  I advised the patient to increase p.o. hydration, take Tylenol 1000 mg p.o. every 6 hours as needed, and rest.  If symptoms persist or worsen, I recommended she follow-up in the emergency room for imaging studies, urine analysis, CBC.      Followup in 4 weeks

## 2020-11-16 NOTE — PROGRESS NOTES
Pt here today for OB follow up  Pt states no VB or LOF, but went to ER today for Sharp LLQ Pain, has noticed pain since Saturday.  Reports +  Good # [phone]  Pharmacy Confirmed.

## 2020-11-16 NOTE — ED TRIAGE NOTES
Chief Complaint   Patient presents with   • Pregnancy   • LLQ Pain     sharp, increases with movement     Pt reports sudden sharp pains in LLQ of abdomen that radiate to left hip.  Denies dysuria, denies vaginal bleeding or discharge.  Triage process explained to patient.  Pt instructed to inform RN if any changes or questions arise.  Pt back to waiting room.

## 2020-11-16 NOTE — LETTER
November 16, 2020    To Whom It May Concern:         This is confirmation that Luba Metcalf Lind attended her scheduled appointment with Stephanie Suarez M.D. on 11/16/20.         If you have any questions please do not hesitate to call me at the phone number listed below.    Sincerely,          Stephanie Suarez M.D   [phone]

## 2020-12-16 ENCOUNTER — ROUTINE PRENATAL (OUTPATIENT)
Dept: OBGYN | Facility: CLINIC | Age: 30
End: 2020-12-16
Payer: MEDICAID

## 2020-12-16 VITALS — DIASTOLIC BLOOD PRESSURE: 60 MMHG | SYSTOLIC BLOOD PRESSURE: 100 MMHG | BODY MASS INDEX: 27.34 KG/M2 | WEIGHT: 140 LBS

## 2020-12-16 DIAGNOSIS — Q85.01 NEUROFIBROMATOSIS, TYPE 1 (VON RECKLINGHAUSEN'S DISEASE) (HCC): ICD-10-CM

## 2020-12-16 PROCEDURE — 90040 PR PRENATAL FOLLOW UP: CPT | Performed by: OBSTETRICS & GYNECOLOGY

## 2020-12-16 ASSESSMENT — FIBROSIS 4 INDEX: FIB4 SCORE: 0.46

## 2020-12-16 NOTE — PROGRESS NOTES
OB follow up   + fetal movement. Active  No VB, LOF or UC's.  Flu vaccine offered Declined   Phone # [phone]  Preferred pharmacy confirmed.  C/o  Fatigue  12/02/2020 was seen at the high risk center  Next appt 12/30/2020

## 2020-12-16 NOTE — PROGRESS NOTES
OB Follow Up--- High Risk  Neurofibromatosis      + Barbs on drug screen       30 y.o. is a 21w6d presents in prenatal follow up.    Subjective:no complaints  . Fetal Movement  positive    Problem List:has Rh negative, Caio received 10/1/20 +antibody screen; Neurofibromatosis, type 1 (von Recklinghausen's disease) (HCC); Hyponatremia; Hyperemesis affecting pregnancy, antepartum; Yeast infection involving the vagina and surrounding area; Migraine without aura or status migrainosus; and Positive urine drug screen on their problem list.     Objective:   /60   Wt 63.6 kg (140 lb 3.2 oz)   LMP 07/18/2020 (Exact Date)   BMI 27.38 kg/m²     Abdomen:  S=D  Extremities:Normal        Lab:No results found for this or any previous visit (from the past 336 hour(s)).      Assessment:    21w6d   High Risk  Neurofibromatosis      + Barbs on drug screen         No pain, bleeding, unusual discharge or leeking    Plan:  4 weeks  Continue water intake  Ambulate nightly after 37 weeks  Continue Kick counts

## 2021-01-13 ENCOUNTER — ROUTINE PRENATAL (OUTPATIENT)
Dept: OBGYN | Facility: CLINIC | Age: 31
End: 2021-01-13
Payer: MEDICAID

## 2021-01-13 VITALS — SYSTOLIC BLOOD PRESSURE: 100 MMHG | BODY MASS INDEX: 29.29 KG/M2 | DIASTOLIC BLOOD PRESSURE: 58 MMHG | WEIGHT: 150 LBS

## 2021-01-13 DIAGNOSIS — R82.5 POSITIVE URINE DRUG SCREEN: ICD-10-CM

## 2021-01-13 DIAGNOSIS — G43.001 MIGRAINE WITHOUT AURA AND WITH STATUS MIGRAINOSUS, NOT INTRACTABLE: ICD-10-CM

## 2021-01-13 DIAGNOSIS — Z34.82 ENCOUNTER FOR SUPERVISION OF OTHER NORMAL PREGNANCY IN SECOND TRIMESTER: ICD-10-CM

## 2021-01-13 PROCEDURE — 90040 PR PRENATAL FOLLOW UP: CPT | Performed by: OBSTETRICS & GYNECOLOGY

## 2021-01-13 RX ORDER — BUTALBITAL, ACETAMINOPHEN AND CAFFEINE 300; 40; 50 MG/1; MG/1; MG/1
1 CAPSULE ORAL EVERY 4 HOURS PRN
Qty: 30 CAP | Refills: 0 | Status: SHIPPED | OUTPATIENT
Start: 2021-01-13 | End: 2021-01-28

## 2021-01-13 ASSESSMENT — FIBROSIS 4 INDEX: FIB4 SCORE: 0.46

## 2021-01-13 NOTE — PROGRESS NOTES
Pt here today for OB follow up @ 25w6d  Pt states denies VB and LOF  Reports +FM  Good # [phone]  Pharmacy Confirmed.

## 2021-01-13 NOTE — PROGRESS NOTES
OB Follow Up--- High Risk  Neurofibromatosis     Migraines       30 y.o. is a 25w6d presents in prenatal follow up.    Subjective:headache   . Fetal Movement  positive    Problem List:has Rh negative, Caio received 10/1/20 +antibody screen; Neurofibromatosis, type 1 (von Recklinghausen's disease) (HCC); Hyponatremia; Hyperemesis affecting pregnancy, antepartum; Yeast infection involving the vagina and surrounding area; Migraine without aura or status migrainosus; and Positive urine drug screen on their problem list.     Objective:   /58   Wt 68 kg (150 lb)   LMP 07/18/2020 (Exact Date)   BMI 29.29 kg/m²     Abdomen:  S=D  Extremities:Normal        Lab:No results found for this or any previous visit (from the past 336 hour(s)).      Assessment:    25w6d      High Risk  Neurofibromatosis     Migraines    No pain, bleeding, unusual discharge or leeking    Plan:  4 weeks  Fioricet for migraines   Continue water intake  Ambulate nightly after 37 weeks  Continue Kick counts

## 2021-02-10 ENCOUNTER — ROUTINE PRENATAL (OUTPATIENT)
Dept: OBGYN | Facility: CLINIC | Age: 31
End: 2021-02-10
Payer: MEDICAID

## 2021-02-10 VITALS — DIASTOLIC BLOOD PRESSURE: 60 MMHG | WEIGHT: 151.8 LBS | SYSTOLIC BLOOD PRESSURE: 110 MMHG | BODY MASS INDEX: 29.65 KG/M2

## 2021-02-10 DIAGNOSIS — Z67.91 RH NEGATIVE, ANTEPARTUM: ICD-10-CM

## 2021-02-10 DIAGNOSIS — O09.899 SUPERVISION OF OTHER HIGH RISK PREGNANCY, ANTEPARTUM: Primary | ICD-10-CM

## 2021-02-10 DIAGNOSIS — O26.899 RH NEGATIVE, ANTEPARTUM: ICD-10-CM

## 2021-02-10 PROBLEM — B37.31 YEAST INFECTION INVOLVING THE VAGINA AND SURROUNDING AREA: Status: RESOLVED | Noted: 2020-10-05 | Resolved: 2021-02-10

## 2021-02-10 PROCEDURE — 90040 PR PRENATAL FOLLOW UP: CPT | Performed by: NURSE PRACTITIONER

## 2021-02-10 ASSESSMENT — FIBROSIS 4 INDEX: FIB4 SCORE: 0.46

## 2021-02-10 NOTE — PROGRESS NOTES
Pt. Here for OB/FU. Reports Good FM.   Good # [phone]  Pt. Denies VB, LOF, or UC's.   Pharmacy verified.   Chaperone offered and not indicated   Pt states having some julieth chand   Rowdy sheet given today along with instructions  Dr. Stoner 2021  BTL signed today  Tdap declined   Rhogam today  NDC: 36381-891-75  LOT#: [id number]  Expiration Date: 2023  Dose: 1500 units  Site: Right Upper Outer Quadrant Gluteal  Patient educated on use and side effects of medication. Name and  verified prior to injection. Pt tolerated? yes   Verified by Erma ROMERO  Administered by Valentín Epps Ass'moi at 10:31 AM.  Patient Provided Medication: no

## 2021-02-10 NOTE — PROGRESS NOTES
S) Pt is a 30 y.o.   at 29w6d  gestation. Routine prenatal care today. No complaints today. Rhogam today. Tdap declined. Desires BTL, forms signed today. Has not done 3rd trimester labs yet, will do ASAP. Has follow up with Dr Stoner 3/3/2021.  labor precautions reviewed and all questions answered.    Fetal movement Normal  Cramping no  VB no  LOF no   Denies dysuria. Generally feels well today. Good self-care activities identified. Denies headaches, swelling, visual changes, or epigastric pain .     O) /60   Wt 68.9 kg (151 lb 12.8 oz)         Labs:       PNL: WNL. Rh negative- Rhogam today       GCT: Not done yet        AFP: normal       GBS: N/A       Pertinent ultrasound -        20- Survey with Dr Stoner- All WNL, MILTON WNL, c/w prev dating. Recommend growth at 32 weeks.    Scheduled follow up 3/3/21    A) IUP at 29w6d       S=D         Patient Active Problem List    Diagnosis Date Noted   • Hyperemesis affecting pregnancy, antepartum 2020   • Neurofibromatosis, type 1 (von Recklinghausen's disease) (HCC) 2015   • Rh negative, Rhogham received 10/1/20 +antibody screen 2012   • Supervision of other high risk pregnancy, antepartum 02/10/2021   • Positive urine drug screen 2020   • Migraine without aura or status migrainosus 10/14/2020   • Hyponatremia 2020          SVE: deferred       Chaperone offered: n/a         TDAP: no       FLU: no        BTL: yes       : n/a       C/S Consent: n/a       IOL or C/S scheduled: no       LAST PAP: 20- negative         P) s/s ptl vs general discomforts. Fetal movements reviewed. General ed and anticipatory guidance. Nutrition/exercise/vitamin. Plans breast Plans pp contraception- unsure  Continue PNV.

## 2021-02-10 NOTE — LETTER
"Count Your Baby's Movements  Another step to a healthy delivery    Luba Lind             Dept: [phone]    How Many Weeks Pregnant?     Date to Begin Countin/10/2021              How to use this chart    One way for your physician to keep track of your baby's health is by knowing how often the baby moves (or \"kicks\") in your womb.  You can help your physician to do this by using this chart every day.    Every day, you should see how many hours it takes for your baby to move 10 times.  Start in the morning, as soon as you get up.    · First, write down the time your baby moves until you get to 10.  · Check off one box every time your baby moves until you get to 10.  · Write down the time you finished counting in the last column.  · Total how long it took to count up all 10 movements.  · Finally, fill in the box that shows how long this took.  After counting 10 movements, you no longer have to count any more that day.  The next morning, just start counting again as soon as you get up.    What should you call a \"movement\"?  It is hard to say, because it will feel different from one mother to another and from one pregnancy to the next.  The important thing is that you count the movements the same way throughout your pregnancy.  If you have more questions, you should ask your physician.    Count carefully every day!  SAMPLE:  Week 28    How many hours did it take to feel 10 movements?       Start  Time     1     2     3     4     5     6     7     8     9     10   Finish Time   Mon 8:20 ·  ·  ·  ·  ·  ·  ·  ·  ·  ·  11:40                  Fri               Sat               Sun                 IMPORTANT: You should contact your physician if it takes more than two hours for you to feel 10 movements.  Each morning, write down the time and start to count the movements of your baby.  Keep track by checking off one box every time you feel one movement.  When you have felt " "10 \"kicks\", write down the time you finished counting in the last column.  Then fill in the   box (over the check keaton) for the number of hours it took.  Be sure to read the complete instructions on the previous page.            "

## 2021-02-11 ENCOUNTER — HOSPITAL ENCOUNTER (OUTPATIENT)
Dept: LAB | Facility: MEDICAL CENTER | Age: 31
End: 2021-02-11
Attending: OBSTETRICS & GYNECOLOGY
Payer: MEDICAID

## 2021-02-11 DIAGNOSIS — Z34.82 ENCOUNTER FOR SUPERVISION OF OTHER NORMAL PREGNANCY IN SECOND TRIMESTER: ICD-10-CM

## 2021-02-11 PROBLEM — O99.019 ANEMIA AFFECTING PREGNANCY: Status: ACTIVE | Noted: 2021-02-11

## 2021-02-11 LAB
ERYTHROCYTE [DISTWIDTH] IN BLOOD BY AUTOMATED COUNT: 44 FL (ref 35.9–50)
GLUCOSE 1H P 50 G GLC PO SERPL-MCNC: 164 MG/DL (ref 70–139)
HCT VFR BLD AUTO: 32.3 % (ref 37–47)
HGB BLD-MCNC: 9.8 G/DL (ref 12–16)
MCH RBC QN AUTO: 25.3 PG (ref 27–33)
MCHC RBC AUTO-ENTMCNC: 30.3 G/DL (ref 33.6–35)
MCV RBC AUTO: 83.2 FL (ref 81.4–97.8)
PLATELET # BLD AUTO: 175 K/UL (ref 164–446)
PMV BLD AUTO: 11.5 FL (ref 9–12.9)
RBC # BLD AUTO: 3.88 M/UL (ref 4.2–5.4)
TREPONEMA PALLIDUM IGG+IGM AB [PRESENCE] IN SERUM OR PLASMA BY IMMUNOASSAY: NORMAL
WBC # BLD AUTO: 9 K/UL (ref 4.8–10.8)

## 2021-02-11 PROCEDURE — 86780 TREPONEMA PALLIDUM: CPT

## 2021-02-11 PROCEDURE — 85027 COMPLETE CBC AUTOMATED: CPT

## 2021-02-11 PROCEDURE — 36415 COLL VENOUS BLD VENIPUNCTURE: CPT

## 2021-02-11 PROCEDURE — 82950 GLUCOSE TEST: CPT

## 2021-02-12 ENCOUNTER — TELEPHONE (OUTPATIENT)
Dept: OBGYN | Facility: CLINIC | Age: 31
End: 2021-02-12

## 2021-02-12 DIAGNOSIS — R73.09 ELEVATED GLUCOSE TOLERANCE TEST: ICD-10-CM

## 2021-02-12 NOTE — TELEPHONE ENCOUNTER
----- Message from ALLEN Bruno sent at 2/11/2021  1:07 PM PST -----  Call pt and let her know she is significantly more anemic than three months ago, she needs to take iron three times a day as tolerated and look up iron rich foods in pregnancy to incorporate into her diet. Please ask if she is having any symptoms of anemia including weakness, overly tired, trouble breathing beyond normal pregnancy change.         Pt notified of need to start on Iron supplementation to take 325 mg TID. Recommended to take it with Vit C, to avoid dairy products 1hr before and 2hr after. Not to take with PNV. To increase water intake to decrease side effects of constipation. Also advised to look for iron rich foods to include in her diet. Pt verbalized understanding.      Consulted with Jess Saunders CNM to review pt's 1hr gtt since is high. Provider agreed pt needs to do a 3hr gtt and stated she will place order.       Pt notified of abnormal 1hr gtt and need to do 3hr gtt this time. Pt instructed to fast 10-12hr prior to testing. Pt informed she is only allow to drink plain water during fasting time. Pt will call lab to schedule an appt. Advised to bring a snack for after the test is done. Pt notified will be staying in the labs for the 3hr. Pt agreed to do it Monday or Tuesday 2/15/2021 or 2/16/2021. Pt verbalized understanding.

## 2021-02-14 DIAGNOSIS — Z3A.30 30 WEEKS GESTATION OF PREGNANCY: ICD-10-CM

## 2021-02-16 ENCOUNTER — HOSPITAL ENCOUNTER (OUTPATIENT)
Dept: LAB | Facility: MEDICAL CENTER | Age: 31
End: 2021-02-16
Attending: NURSE PRACTITIONER
Payer: MEDICAID

## 2021-02-16 DIAGNOSIS — Z3A.30 30 WEEKS GESTATION OF PREGNANCY: ICD-10-CM

## 2021-02-16 LAB
GLUCOSE 1H P CHAL SERPL-MCNC: 164 MG/DL (ref 65–180)
GLUCOSE 2H P CHAL SERPL-MCNC: 122 MG/DL (ref 65–155)
GLUCOSE 3H P CHAL SERPL-MCNC: 89 MG/DL (ref 65–140)
GLUCOSE BS SERPL-MCNC: 74 MG/DL (ref 65–95)

## 2021-02-16 PROCEDURE — 82952 GTT-ADDED SAMPLES: CPT

## 2021-02-16 PROCEDURE — 36415 COLL VENOUS BLD VENIPUNCTURE: CPT

## 2021-02-16 PROCEDURE — 82951 GLUCOSE TOLERANCE TEST (GTT): CPT

## 2021-02-23 ENCOUNTER — ROUTINE PRENATAL (OUTPATIENT)
Dept: OBGYN | Facility: CLINIC | Age: 31
End: 2021-02-23
Payer: MEDICAID

## 2021-02-23 VITALS — DIASTOLIC BLOOD PRESSURE: 60 MMHG | BODY MASS INDEX: 29.96 KG/M2 | SYSTOLIC BLOOD PRESSURE: 100 MMHG | WEIGHT: 153.4 LBS

## 2021-02-23 DIAGNOSIS — O09.899 SUPERVISION OF OTHER HIGH RISK PREGNANCY, ANTEPARTUM: Primary | ICD-10-CM

## 2021-02-23 PROCEDURE — 90040 PR PRENATAL FOLLOW UP: CPT | Performed by: NURSE PRACTITIONER

## 2021-02-23 ASSESSMENT — FIBROSIS 4 INDEX: FIB4 SCORE: 0.63

## 2021-02-23 NOTE — PROGRESS NOTES
Pt. Here for OB/FU. Reports Good FM.   Good # [phone]  Pt. Denies VB, LOF, or UC's.   Pharmacy verified.   Chaperone offered and not indicated  Pt states that she has been getting a lot of pelvic pressure and some headaches

## 2021-02-23 NOTE — PROGRESS NOTES
S) Pt is a 30 y.o.   at 31w5d  gestation. Routine prenatal care today. Complains of headaches, but are relieved by normal measured. Reviewed recent lab results.  labor precautions reviewed, all questions answered.    Fetal movement Normal  Cramping no  VB no  LOF no   Denies dysuria. Generally feels well today. Good self-care activities identified. Denies headaches, swelling, visual changes, or epigastric pain .     O) /60   Wt 69.6 kg (153 lb 6.4 oz)         Labs:       PNL: WNL       GCT: 164, but 3 hour WNL        AFP: normal       GBS: N/A       Pertinent ultrasound -        20- Survey with Dr Stoner- All WNL, MILTON WNL, c/w prev dating. Recommend growth at 32 weeks.    Has follow up 3/3/2021    A) IUP at 31w5d       S=D         Patient Active Problem List    Diagnosis Date Noted   • Hyperemesis affecting pregnancy, antepartum 2020   • Neurofibromatosis, type 1 (von Recklinghausen's disease) ([facility]) 2015   • Rh negative, Rhogham received 10/1/20 +antibody screen 2012   • Anemia affecting pregnancy 2021   • Supervision of other high risk pregnancy, antepartum 02/10/2021   • Positive urine drug screen 2020   • Migraine without aura or status migrainosus 10/14/2020   • Hyponatremia 2020          SVE: deferred       Chaperone offered: n/a         TDAP: no       FLU: no        BTL: yes       : n/a       C/S Consent: n/a       IOL or C/S scheduled: no       LAST PAP: 20- negative         P) s/s ptl vs general discomforts. Fetal movements reviewed. General ed and anticipatory guidance. Nutrition/exercise/vitamin. Plans breast Plans pp contraception- BTL if c/s.  Continue PNV.

## 2021-03-09 ENCOUNTER — ROUTINE PRENATAL (OUTPATIENT)
Dept: OBGYN | Facility: CLINIC | Age: 31
End: 2021-03-09
Payer: MEDICAID

## 2021-03-09 VITALS — WEIGHT: 153 LBS | DIASTOLIC BLOOD PRESSURE: 60 MMHG | SYSTOLIC BLOOD PRESSURE: 100 MMHG | BODY MASS INDEX: 29.88 KG/M2

## 2021-03-09 DIAGNOSIS — O09.93 ENCOUNTER FOR SUPERVISION OF HIGH RISK PREGNANCY IN THIRD TRIMESTER, ANTEPARTUM: Primary | ICD-10-CM

## 2021-03-09 PROCEDURE — 90040 PR PRENATAL FOLLOW UP: CPT | Performed by: NURSE PRACTITIONER

## 2021-03-09 ASSESSMENT — FIBROSIS 4 INDEX: FIB4 SCORE: 0.63

## 2021-03-09 NOTE — PROGRESS NOTES
S:  Pt is  at 33w5d for routine OB follow up.  No concerns today. She had an appt with Dr. Stoner on 3/3/21, no report in media, says she was told MILTON was normal and that the baby was measuring 1 week behind. Will call for result ED or hospital visits since last seen. Reports good FM.  Denies VB, LOF, RUCs or vaginal DC. She ahs been unable to take iron supplements without becoming nauseated, so she has increase iron radha foods    O:  Please see above vitals.        FHTs: 145        Fundal ht: 35 cm.        S>D        3 hr GTT wnl.   A:  IUP at 33w5d  Patient Active Problem List    Diagnosis Date Noted   • Hyperemesis affecting pregnancy, antepartum 2020     Priority: High   • Neurofibromatosis, type 1 (von Recklinghausen's disease) (HCC) 2015     Priority: High   • Rh negative, Rhogham received 10/1/20 +antibody screen 2012     Priority: High   • Anemia affecting pregnancy 2021   • Supervision of other high risk pregnancy, antepartum 02/10/2021   • Positive urine drug screen 2020   • Migraine without aura or status migrainosus 10/14/2020   • Hyponatremia 2020        P:  1.  GBS @ 36 wks.          2.  Continue FKCs.          3.  Questions answered.          4.  Encouraged pt to tour L&D.          5.  Encourage adequate water intake.        6.  F/u 2 wks.        7.  .

## 2021-03-09 NOTE — LETTER
March 9, 2021         Patient: Luba Lind   YOB: 1990   Date of Visit: 3/9/2021           To Whom it May Concern:    Luba Lind was seen in my clinic on 3/9/2021. She may return back to work on 03/10/2021.    If you have any questions or concerns, please don't hesitate to call.        Sincerely,           GERI Morales.  Electronically Signed

## 2021-03-09 NOTE — PROGRESS NOTES
OB follow up   + fetal movement. Active   No VB, LOF or UC's.  Flu vaccine offered Declined   Phone # [phone]  Preferred pharmacy confirmed.  No complaints as of today

## 2021-03-23 ENCOUNTER — ROUTINE PRENATAL (OUTPATIENT)
Dept: OBGYN | Facility: CLINIC | Age: 31
End: 2021-03-23
Payer: MEDICAID

## 2021-03-23 ENCOUNTER — HOSPITAL ENCOUNTER (OUTPATIENT)
Facility: MEDICAL CENTER | Age: 31
End: 2021-03-23
Attending: NURSE PRACTITIONER
Payer: MEDICAID

## 2021-03-23 VITALS — WEIGHT: 156 LBS | DIASTOLIC BLOOD PRESSURE: 70 MMHG | SYSTOLIC BLOOD PRESSURE: 102 MMHG | BODY MASS INDEX: 30.47 KG/M2

## 2021-03-23 DIAGNOSIS — O09.899 SUPERVISION OF OTHER HIGH RISK PREGNANCY, ANTEPARTUM: Primary | ICD-10-CM

## 2021-03-23 DIAGNOSIS — O09.899 SUPERVISION OF OTHER HIGH RISK PREGNANCY, ANTEPARTUM: ICD-10-CM

## 2021-03-23 PROCEDURE — 87081 CULTURE SCREEN ONLY: CPT

## 2021-03-23 PROCEDURE — 87150 DNA/RNA AMPLIFIED PROBE: CPT

## 2021-03-23 PROCEDURE — 90040 PR PRENATAL FOLLOW UP: CPT | Performed by: NURSE PRACTITIONER

## 2021-03-23 ASSESSMENT — FIBROSIS 4 INDEX: FIB4 SCORE: 0.63

## 2021-03-23 NOTE — PROGRESS NOTES
OB follow up   + fetal movement.  No VB, LOF or UC's.    Phone # [phone]  Preferred pharmacy confirmed.

## 2021-03-23 NOTE — PROGRESS NOTES
S:  Pt is  at 35w5d here for routine OB follow up.  Reports a lot of pelvic pain and pressure.  Reports good FM.  Denies VB, LOF, RUCs, or vaginal DC. Denies cough, SOB, sore throat or fever.  Denies exposure to anyone with COVID 19.  Pt reports no f/u appts w Dr. Stoner.    O:    Vitals:    21 0944   BP: 102/70   Weight: 70.8 kg (156 lb)           FHTs: 143        Fundal ht: 36 cm.        Fetal position: vertex.    A:  IUP at 35w5d  Patient Active Problem List    Diagnosis Date Noted   • Hyperemesis affecting pregnancy, antepartum 2020     Priority: High   • Neurofibromatosis, type 1 (von Recklinghausen's disease) ([facility]) 2015     Priority: High   • Rh negative, Rhogham received 10/1/20 +antibody screen 2012     Priority: High   • Anemia affecting pregnancy 2021   • Supervision of other high risk pregnancy, antepartum 02/10/2021   • Positive urine drug screen 2020   • Migraine without aura or status migrainosus 10/14/2020   • Hyponatremia 2020       P:  1.  GBS obtained.          2.  Labor precautions given.  Instructions given on where to go.  Pt receptive to              education.          3.  Questions answered.          4.  Continue FKCs.          5.  Encouraged adequate water intake        6.  F/u 1 wk.        7.  D/w pt helps for pelvic pain.    Chaperone offered and provided by Chitra Gama MA.

## 2021-03-23 NOTE — PATIENT INSTRUCTIONS
P:  1.  GBS obtained.          2.  Labor precautions given.  Instructions given on where to go.  Pt receptive to              education.          3.  Questions answered.          4.  Continue FKCs.          5.  Encouraged adequate water intake        6.  F/u 1 wk.        7.  D/w pt helps for pelvic pain.

## 2021-03-26 LAB — GP B STREP DNA SPEC QL NAA+PROBE: POSITIVE

## 2021-03-29 ENCOUNTER — ROUTINE PRENATAL (OUTPATIENT)
Dept: OBGYN | Facility: CLINIC | Age: 31
End: 2021-03-29
Payer: MEDICAID

## 2021-03-29 VITALS — WEIGHT: 157 LBS | DIASTOLIC BLOOD PRESSURE: 58 MMHG | SYSTOLIC BLOOD PRESSURE: 104 MMHG | BODY MASS INDEX: 30.66 KG/M2

## 2021-03-29 DIAGNOSIS — O09.899 SUPERVISION OF OTHER HIGH RISK PREGNANCY, ANTEPARTUM: Primary | ICD-10-CM

## 2021-03-29 PROCEDURE — 90040 PR PRENATAL FOLLOW UP: CPT | Performed by: NURSE PRACTITIONER

## 2021-03-29 RX ORDER — BUTALBITAL AND ACETAMINOPHEN 50; 300 MG/1; MG/1
CAPSULE ORAL
COMMUNITY
End: 2021-06-26

## 2021-03-29 ASSESSMENT — FIBROSIS 4 INDEX: FIB4 SCORE: 0.63

## 2021-03-29 NOTE — PROGRESS NOTES
Pt here today for OB follow up  Positive GBS, pt aware  Reports +FM  WT: 157 lb  BP: 104/58  Preferred pharmacy verified with pt.  Pt states has been having leakage of fluid since 03/21 off and on. States she has clear fluid this morning again. Pt also reports having the  julieth chand with pelvic pressure.   Boris # [phone]     yes

## 2021-03-29 NOTE — PROGRESS NOTES
S) Pt is a 30 y.o.   at 36w4d  gestation. Routine prenatal care today. Continues to have some increase in discharge, clearish and mucousy, usually every other day or so with BH contractions irregularly.    Fetal movement Normal  Cramping no  VB no  LOF no   Denies dysuria. Generally feels well today. Good self-care activities identified. Denies headaches, swelling, visual changes, or epigastric pain .     O) See flow sheet for vital signs and fetal measurements.          Labs: WNL       PNL:+barbituates (fioricet)       GCT: elevated 1 hr, normal 3 hour       AFP: low risk NIPT       GBS: positive       Pertinent ultrasound - 3/3/21 O[place], growth 44%       20 normal anatomy scan, [place]    Red raised bumps covering most of body    A) IUP at 36w4d       S=D         Patient Active Problem List    Diagnosis Date Noted   • Hyperemesis affecting pregnancy, antepartum 2020   • Neurofibromatosis, type 1 (von Recklinghausen's disease) ([facility]) 2015   • Rh negative, Rhogham received 10/1/20 +antibody screen 2012   • Anemia affecting pregnancy 2021   • Supervision of other high risk pregnancy, antepartum 02/10/2021   • Positive urine drug screen 2020   • Migraine without aura or status migrainosus 10/14/2020   • Hyponatremia 2020          SVE: deferred         TDAP: no       FLU: no        BTL: yes       : no       C/S Consent: no       IOL or C/S scheduled: no       LAST PAP: 20 negative         P) s/s ptl vs general discomforts. Fetal movements reviewed. General ed and anticipatory guidance. Nutrition/exercise/vitamin. Plans breast Plans pp contraception- unsure  Continue PNV.   Reviewed GBS status.   Discussed IOL after 39 weeks and up to 41 weeks.   RTC 1 week or PRN.

## 2021-04-06 ENCOUNTER — ROUTINE PRENATAL (OUTPATIENT)
Dept: OBGYN | Facility: CLINIC | Age: 31
End: 2021-04-06
Payer: MEDICAID

## 2021-04-06 VITALS — BODY MASS INDEX: 30.9 KG/M2 | WEIGHT: 158.2 LBS | SYSTOLIC BLOOD PRESSURE: 98 MMHG | DIASTOLIC BLOOD PRESSURE: 50 MMHG

## 2021-04-06 DIAGNOSIS — O99.820 GBS (GROUP B STREPTOCOCCUS CARRIER), +RV CULTURE, CURRENTLY PREGNANT: ICD-10-CM

## 2021-04-06 DIAGNOSIS — G43.001 MIGRAINE WITHOUT AURA AND WITH STATUS MIGRAINOSUS, NOT INTRACTABLE: ICD-10-CM

## 2021-04-06 PROCEDURE — 90040 PR PRENATAL FOLLOW UP: CPT | Performed by: NURSE PRACTITIONER

## 2021-04-06 RX ORDER — BUTALBITAL, ACETAMINOPHEN AND CAFFEINE 300; 40; 50 MG/1; MG/1; MG/1
1 CAPSULE ORAL EVERY 4 HOURS PRN
Qty: 15 CAPSULE | Refills: 0 | Status: SHIPPED | OUTPATIENT
Start: 2021-04-06 | End: 2021-04-06

## 2021-04-06 RX ORDER — BUTALBITAL, ACETAMINOPHEN AND CAFFEINE 300; 40; 50 MG/1; MG/1; MG/1
1 CAPSULE ORAL EVERY 4 HOURS PRN
Qty: 15 CAPSULE | Refills: 0 | Status: SHIPPED | OUTPATIENT
Start: 2021-04-06 | End: 2021-04-12

## 2021-04-06 ASSESSMENT — FIBROSIS 4 INDEX: FIB4 SCORE: 0.63

## 2021-04-06 NOTE — PROGRESS NOTES
S:  Pt is  at 37w5d here for routine OB follow up.  Reports migraine headache. No ED or hospital visits since last seen. Reports good FM.  Denies VB, LOF, RUCs, or vaginal DC.     O:  Please see above vitals.        FHTs: 145        Fundal ht: 37 cm        Fetal position: vertex        SVE: deferred        GBS positive      A:  IUP at 37w5d  Patient Active Problem List    Diagnosis Date Noted   • Hyperemesis affecting pregnancy, antepartum 2020     Priority: High   • Neurofibromatosis, type 1 (von Recklinghausen's disease) ([facility]) 2015     Priority: High   • Rh negative, Rhogham received 10/1/20 +antibody screen 2012     Priority: High   • Anemia affecting pregnancy 2021   • Supervision of other high risk pregnancy, antepartum 02/10/2021   • Positive urine drug screen 2020   • Migraine without aura or status migrainosus 10/14/2020   • Hyponatremia 2020       P:  1.  Anticipatory guidance given         2.  Labor precautions given.  Instructions given on where to go.  Pt receptive to education.         3.  D/w pt plan for VE and IOL referral at 38 wk visit.        4.  Questions answered.         5.  Encouraged adequate water intake, walking 30-60 min daily, pelvic rocking, birthing ball       6.  F/u 1wk       7.  Rx for Fioricet .

## 2021-04-06 NOTE — PROGRESS NOTES
OB follow up   + fetal movement. Active  No VB, LOF or UC's.  Phone # [phone]  Preferred pharmacy confirmed.  GBS positive  No complaints as of today

## 2021-04-12 ENCOUNTER — ROUTINE PRENATAL (OUTPATIENT)
Dept: OBGYN | Facility: CLINIC | Age: 31
End: 2021-04-12
Payer: MEDICAID

## 2021-04-12 VITALS — WEIGHT: 159 LBS | BODY MASS INDEX: 31.05 KG/M2 | DIASTOLIC BLOOD PRESSURE: 70 MMHG | SYSTOLIC BLOOD PRESSURE: 114 MMHG

## 2021-04-12 DIAGNOSIS — Q85.01 NEUROFIBROMATOSIS, TYPE 1 (VON RECKLINGHAUSEN'S DISEASE) (HCC): ICD-10-CM

## 2021-04-12 DIAGNOSIS — Z67.91 RH NEGATIVE, ANTEPARTUM: ICD-10-CM

## 2021-04-12 DIAGNOSIS — O09.899 SUPERVISION OF OTHER HIGH RISK PREGNANCY, ANTEPARTUM: Primary | ICD-10-CM

## 2021-04-12 DIAGNOSIS — O26.899 RH NEGATIVE, ANTEPARTUM: ICD-10-CM

## 2021-04-12 DIAGNOSIS — O99.820 GBS (GROUP B STREPTOCOCCUS CARRIER), +RV CULTURE, CURRENTLY PREGNANT: ICD-10-CM

## 2021-04-12 PROCEDURE — 90040 PR PRENATAL FOLLOW UP: CPT | Performed by: NURSE PRACTITIONER

## 2021-04-12 ASSESSMENT — FIBROSIS 4 INDEX: FIB4 SCORE: 0.63

## 2021-04-12 NOTE — PROGRESS NOTES
OB follow up   + fetal movement.  No VB, LOF or UC's.  Phone # [phone]  Preferred pharmacy confirmed.  GBS positive  Dr Stoner seen 3/3/21. Report in media. No me follow ups

## 2021-04-12 NOTE — PROGRESS NOTES
S:  Pt is  at 38w4d here for routine OB follow up.  Reports some incr in vaginal DC, denies itching or odor.  Also notes some small contractions.  Reports good FM.  Denies VB, LOF, RUCs. Denies cough, SOB, sore throat or fever.  Denies exposure to anyone with COVID 19.  Desires SVE today.    O:    Vitals:    21 0934   BP: 114/70   Weight: 72.1 kg (159 lb)           FHTs: 147        Fundal ht: 37        Fetal position: vertex        SVE: 2-3/50/-2        GBS positive on 3/23/21 -- reviewed w pt.      A:  IUP at 38w4d  Patient Active Problem List    Diagnosis Date Noted   • Hyperemesis affecting pregnancy, antepartum 2020     Priority: High   • Neurofibromatosis, type 1 (von Recklinghausen's disease) ([facility]) 2015     Priority: High   • Rh negative, Rhogham received 10/1/20 +antibody screen 2012     Priority: High   • GBS (group B Streptococcus carrier), +RV culture, currently pregnant 2021   • Anemia affecting pregnancy 2021   • Supervision of other high risk pregnancy, antepartum 02/10/2021   • Positive urine drug screen 2020   • Migraine without aura or status migrainosus 10/14/2020   • Hyponatremia 2020       P:  1.  Continue FKCs.         2.  Labor precautions given.  Instructions given on where to go.  Pt receptive to education.         3.  D/w pt IOL policy.  IOL referral placed for JAZMYNE per pt request.        4.  Questions answered.         5.  Encouraged adequate water intake       6.  F/u 1wk       7.  L&D policies reviewed.    Chaperone offered and provided by Chitra Gama MA.    Consulted w Dr. De Leon regarding pt and POC.

## 2021-04-12 NOTE — PATIENT INSTRUCTIONS
P:  1.  Continue FKCs.         2.  Labor precautions given.  Instructions given on where to go.  Pt receptive to education.         3.  D/w pt IOL policy.  IOL referral placed for JAZMYNE per pt request.        4.  Questions answered.         5.  Encouraged adequate water intake       6.  F/u 1wk       7.  L&D policies reviewed.

## 2021-04-19 ENCOUNTER — HOSPITAL ENCOUNTER (OUTPATIENT)
Dept: OBGYN | Facility: MEDICAL CENTER | Age: 31
End: 2021-04-19
Attending: OBSTETRICS & GYNECOLOGY
Payer: MEDICAID

## 2021-04-19 ENCOUNTER — ROUTINE PRENATAL (OUTPATIENT)
Dept: OBGYN | Facility: CLINIC | Age: 31
End: 2021-04-19
Payer: MEDICAID

## 2021-04-19 VITALS — BODY MASS INDEX: 31.21 KG/M2 | SYSTOLIC BLOOD PRESSURE: 122 MMHG | WEIGHT: 159.8 LBS | DIASTOLIC BLOOD PRESSURE: 70 MMHG

## 2021-04-19 DIAGNOSIS — O09.93 ENCOUNTER FOR SUPERVISION OF HIGH RISK PREGNANCY IN THIRD TRIMESTER, ANTEPARTUM: Primary | ICD-10-CM

## 2021-04-19 LAB
SARS-COV-2 RNA RESP QL NAA+PROBE: NOTDETECTED
SPECIMEN SOURCE: NORMAL

## 2021-04-19 PROCEDURE — U0003 INFECTIOUS AGENT DETECTION BY NUCLEIC ACID (DNA OR RNA); SEVERE ACUTE RESPIRATORY SYNDROME CORONAVIRUS 2 (SARS-COV-2) (CORONAVIRUS DISEASE [COVID-19]), AMPLIFIED PROBE TECHNIQUE, MAKING USE OF HIGH THROUGHPUT TECHNOLOGIES AS DESCRIBED BY CMS-2020-01-R: HCPCS

## 2021-04-19 PROCEDURE — 90040 PR PRENATAL FOLLOW UP: CPT | Performed by: NURSE PRACTITIONER

## 2021-04-19 PROCEDURE — U0005 INFEC AGEN DETEC AMPLI PROBE: HCPCS

## 2021-04-19 ASSESSMENT — FIBROSIS 4 INDEX: FIB4 SCORE: 0.63

## 2021-04-19 NOTE — PROGRESS NOTES
Pt is here for OB follow-up  No VB, UC's or LOF.  Good phone #:[phone]  Pharmacy verified.  Pt states she had lost her mucus plug 5 days ago.  Pt states no other complaints for today.  Pt states would like to have a cervical check today.  Patient is scheduled for IOL on 04/22/21 at 9am, info given to Pt.

## 2021-04-19 NOTE — PROGRESS NOTES
S:  Pt is  at 39w4d here for routine OB follow up.  Reports loss of mucus plug.  NO ED or hospital visits since last seen. Reports good FM.  Denies VB, LOF, RUCs, or vaginal DC.     O:  Please see above vitals.        FHTs: 145        Fundal ht: 38 cm        Fetal position: vertex        SVE: /-2        GBS positive  -- reviewed w pt.      A:  IUP at 39w4d  Patient Active Problem List    Diagnosis Date Noted   • Hyperemesis affecting pregnancy, antepartum 2020     Priority: High   • Neurofibromatosis, type 1 (von Recklinghausen's disease) ([facility]) 2015     Priority: High   • Rh negative, Rhogham received 10/1/20 +antibody screen 2012     Priority: High   • GBS (group B Streptococcus carrier), +RV culture, currently pregnant 2021   • Anemia affecting pregnancy 2021   • Supervision of other high risk pregnancy, antepartum 02/10/2021   • Positive urine drug screen 2020   • Migraine without aura or status migrainosus 10/14/2020   • Hyponatremia 2020       P:  1.  Anticipatory guidance given         2.  Labor precautions given.  Instructions given on where to go.  Pt receptive to education.         3.  D/w pt IOL policy.  IOL scheduled.        4.  Questions answered.         5.  Encouraged adequate water intake, walking 30-60 min daily, pelvic rocking, birthing ball       6.  F/u for IOL.

## 2021-04-19 NOTE — PROGRESS NOTES
1147-Patient here for pre admit COVID Screen. Self Isolation instructions reviewed and given to pt. COVID collection explained to pt. Pt states understanding and denies any questions. COVID test collected and sent to lab. Pt sent home to self isolate

## 2021-04-22 ENCOUNTER — ANESTHESIA (OUTPATIENT)
Dept: ANESTHESIOLOGY | Facility: MEDICAL CENTER | Age: 31
End: 2021-04-22
Payer: MEDICAID

## 2021-04-22 ENCOUNTER — ANESTHESIA EVENT (OUTPATIENT)
Dept: ANESTHESIOLOGY | Facility: MEDICAL CENTER | Age: 31
End: 2021-04-22
Payer: MEDICAID

## 2021-04-22 ENCOUNTER — HOSPITAL ENCOUNTER (INPATIENT)
Facility: MEDICAL CENTER | Age: 31
LOS: 3 days | End: 2021-04-25
Attending: OBSTETRICS & GYNECOLOGY | Admitting: OBSTETRICS & GYNECOLOGY
Payer: MEDICAID

## 2021-04-22 ENCOUNTER — APPOINTMENT (OUTPATIENT)
Dept: OBGYN | Facility: MEDICAL CENTER | Age: 31
End: 2021-04-22
Attending: OBSTETRICS & GYNECOLOGY
Payer: MEDICAID

## 2021-04-22 LAB
BASOPHILS # BLD AUTO: 0.3 % (ref 0–1.8)
BASOPHILS # BLD: 0.02 K/UL (ref 0–0.12)
EOSINOPHIL # BLD AUTO: 0.05 K/UL (ref 0–0.51)
EOSINOPHIL NFR BLD: 0.7 % (ref 0–6.9)
ERYTHROCYTE [DISTWIDTH] IN BLOOD BY AUTOMATED COUNT: 49.7 FL (ref 35.9–50)
HCT VFR BLD AUTO: 37.9 % (ref 37–47)
HGB BLD-MCNC: 12.5 G/DL (ref 12–16)
HOLDING TUBE BB 8507: NORMAL
IMM GRANULOCYTES # BLD AUTO: 0.04 K/UL (ref 0–0.11)
IMM GRANULOCYTES NFR BLD AUTO: 0.5 % (ref 0–0.9)
LYMPHOCYTES # BLD AUTO: 1 K/UL (ref 1–4.8)
LYMPHOCYTES NFR BLD: 13.7 % (ref 22–41)
MCH RBC QN AUTO: 27.7 PG (ref 27–33)
MCHC RBC AUTO-ENTMCNC: 33 G/DL (ref 33.6–35)
MCV RBC AUTO: 84 FL (ref 81.4–97.8)
MONOCYTES # BLD AUTO: 0.45 K/UL (ref 0–0.85)
MONOCYTES NFR BLD AUTO: 6.2 % (ref 0–13.4)
NEUTROPHILS # BLD AUTO: 5.75 K/UL (ref 2–7.15)
NEUTROPHILS NFR BLD: 78.6 % (ref 44–72)
NRBC # BLD AUTO: 0 K/UL
NRBC BLD-RTO: 0 /100 WBC
PLATELET # BLD AUTO: 159 K/UL (ref 164–446)
PMV BLD AUTO: 10.5 FL (ref 9–12.9)
RBC # BLD AUTO: 4.51 M/UL (ref 4.2–5.4)
WBC # BLD AUTO: 7.3 K/UL (ref 4.8–10.8)

## 2021-04-22 PROCEDURE — 700105 HCHG RX REV CODE 258: Performed by: OBSTETRICS & GYNECOLOGY

## 2021-04-22 PROCEDURE — 3E0S3BZ INTRODUCTION OF ANESTHETIC AGENT INTO EPIDURAL SPACE, PERCUTANEOUS APPROACH: ICD-10-PCS | Performed by: ANESTHESIOLOGY

## 2021-04-22 PROCEDURE — 700111 HCHG RX REV CODE 636 W/ 250 OVERRIDE (IP): Performed by: OBSTETRICS & GYNECOLOGY

## 2021-04-22 PROCEDURE — 10907ZC DRAINAGE OF AMNIOTIC FLUID, THERAPEUTIC FROM PRODUCTS OF CONCEPTION, VIA NATURAL OR ARTIFICIAL OPENING: ICD-10-PCS | Performed by: OBSTETRICS & GYNECOLOGY

## 2021-04-22 PROCEDURE — 700111 HCHG RX REV CODE 636 W/ 250 OVERRIDE (IP)

## 2021-04-22 PROCEDURE — 36415 COLL VENOUS BLD VENIPUNCTURE: CPT

## 2021-04-22 PROCEDURE — 3E033VJ INTRODUCTION OF OTHER HORMONE INTO PERIPHERAL VEIN, PERCUTANEOUS APPROACH: ICD-10-PCS | Performed by: OBSTETRICS & GYNECOLOGY

## 2021-04-22 PROCEDURE — A9270 NON-COVERED ITEM OR SERVICE: HCPCS | Performed by: OBSTETRICS & GYNECOLOGY

## 2021-04-22 PROCEDURE — 700102 HCHG RX REV CODE 250 W/ 637 OVERRIDE(OP): Performed by: OBSTETRICS & GYNECOLOGY

## 2021-04-22 PROCEDURE — 700111 HCHG RX REV CODE 636 W/ 250 OVERRIDE (IP): Performed by: ANESTHESIOLOGY

## 2021-04-22 PROCEDURE — 85025 COMPLETE CBC W/AUTO DIFF WBC: CPT

## 2021-04-22 PROCEDURE — 770002 HCHG ROOM/CARE - OB PRIVATE (112)

## 2021-04-22 RX ORDER — ONDANSETRON 4 MG/1
4 TABLET, ORALLY DISINTEGRATING ORAL EVERY 6 HOURS PRN
Status: DISCONTINUED | OUTPATIENT
Start: 2021-04-22 | End: 2021-04-25 | Stop reason: HOSPADM

## 2021-04-22 RX ORDER — BUPIVACAINE HYDROCHLORIDE 2.5 MG/ML
INJECTION, SOLUTION EPIDURAL; INFILTRATION; INTRACAUDAL PRN
Status: DISCONTINUED | OUTPATIENT
Start: 2021-04-22 | End: 2021-04-23 | Stop reason: SURG

## 2021-04-22 RX ORDER — ACETAMINOPHEN 325 MG/1
650 TABLET ORAL EVERY 4 HOURS PRN
Status: DISCONTINUED | OUTPATIENT
Start: 2021-04-22 | End: 2021-04-23

## 2021-04-22 RX ORDER — SODIUM CHLORIDE, SODIUM LACTATE, POTASSIUM CHLORIDE, AND CALCIUM CHLORIDE .6; .31; .03; .02 G/100ML; G/100ML; G/100ML; G/100ML
1000 INJECTION, SOLUTION INTRAVENOUS
Status: DISCONTINUED | OUTPATIENT
Start: 2021-04-22 | End: 2021-04-23 | Stop reason: HOSPADM

## 2021-04-22 RX ORDER — ONDANSETRON 2 MG/ML
4 INJECTION INTRAMUSCULAR; INTRAVENOUS EVERY 6 HOURS PRN
Status: DISCONTINUED | OUTPATIENT
Start: 2021-04-22 | End: 2021-04-25 | Stop reason: HOSPADM

## 2021-04-22 RX ORDER — METHYLERGONOVINE MALEATE 0.2 MG/ML
0.2 INJECTION INTRAVENOUS
Status: DISCONTINUED | OUTPATIENT
Start: 2021-04-22 | End: 2021-04-23 | Stop reason: HOSPADM

## 2021-04-22 RX ORDER — ROPIVACAINE HYDROCHLORIDE 2 MG/ML
INJECTION, SOLUTION EPIDURAL; INFILTRATION; PERINEURAL CONTINUOUS
Status: DISCONTINUED | OUTPATIENT
Start: 2021-04-22 | End: 2021-04-22

## 2021-04-22 RX ORDER — ROPIVACAINE HYDROCHLORIDE 2 MG/ML
INJECTION, SOLUTION EPIDURAL; INFILTRATION; PERINEURAL CONTINUOUS
Status: DISCONTINUED | OUTPATIENT
Start: 2021-04-22 | End: 2021-04-23

## 2021-04-22 RX ORDER — SODIUM CHLORIDE 9 MG/ML
INJECTION, SOLUTION INTRAVENOUS
Status: ACTIVE
Start: 2021-04-22 | End: 2021-04-22

## 2021-04-22 RX ORDER — IBUPROFEN 800 MG/1
800 TABLET ORAL EVERY 8 HOURS PRN
Status: DISCONTINUED | OUTPATIENT
Start: 2021-04-22 | End: 2021-04-23

## 2021-04-22 RX ORDER — SODIUM CHLORIDE, SODIUM LACTATE, POTASSIUM CHLORIDE, AND CALCIUM CHLORIDE .6; .31; .03; .02 G/100ML; G/100ML; G/100ML; G/100ML
250 INJECTION, SOLUTION INTRAVENOUS PRN
Status: DISCONTINUED | OUTPATIENT
Start: 2021-04-22 | End: 2021-04-23 | Stop reason: HOSPADM

## 2021-04-22 RX ORDER — SODIUM CHLORIDE, SODIUM LACTATE, POTASSIUM CHLORIDE, CALCIUM CHLORIDE 600; 310; 30; 20 MG/100ML; MG/100ML; MG/100ML; MG/100ML
INJECTION, SOLUTION INTRAVENOUS CONTINUOUS
Status: ACTIVE | OUTPATIENT
Start: 2021-04-22 | End: 2021-04-22

## 2021-04-22 RX ORDER — MISOPROSTOL 200 UG/1
800 TABLET ORAL
Status: DISCONTINUED | OUTPATIENT
Start: 2021-04-22 | End: 2021-04-23 | Stop reason: HOSPADM

## 2021-04-22 RX ORDER — PENICILLIN G POTASSIUM 5000000 [IU]/1
INJECTION, POWDER, FOR SOLUTION INTRAMUSCULAR; INTRAVENOUS
Status: ACTIVE
Start: 2021-04-22 | End: 2021-04-22

## 2021-04-22 RX ORDER — ACETAMINOPHEN 325 MG/1
650 TABLET ORAL EVERY 4 HOURS PRN
Status: CANCELLED | OUTPATIENT
Start: 2021-04-22

## 2021-04-22 RX ORDER — ROPIVACAINE HYDROCHLORIDE 2 MG/ML
INJECTION, SOLUTION EPIDURAL; INFILTRATION; PERINEURAL
Status: COMPLETED
Start: 2021-04-22 | End: 2021-04-22

## 2021-04-22 RX ADMIN — SODIUM CHLORIDE 2.5 MILLION UNITS: 9 INJECTION, SOLUTION INTRAVENOUS at 15:38

## 2021-04-22 RX ADMIN — SODIUM CHLORIDE 2.5 MILLION UNITS: 9 INJECTION, SOLUTION INTRAVENOUS at 19:27

## 2021-04-22 RX ADMIN — OXYTOCIN 2 MILLI-UNITS/MIN: 10 INJECTION, SOLUTION INTRAMUSCULAR; INTRAVENOUS at 11:27

## 2021-04-22 RX ADMIN — ROPIVACAINE HYDROCHLORIDE 200 MG: 2 INJECTION, SOLUTION EPIDURAL; INFILTRATION at 17:41

## 2021-04-22 RX ADMIN — ROPIVACAINE HYDROCHLORIDE 200 MG: 2 INJECTION, SOLUTION EPIDURAL; INFILTRATION; PERINEURAL at 17:41

## 2021-04-22 RX ADMIN — SODIUM CHLORIDE, POTASSIUM CHLORIDE, SODIUM LACTATE AND CALCIUM CHLORIDE: 600; 310; 30; 20 INJECTION, SOLUTION INTRAVENOUS at 17:42

## 2021-04-22 RX ADMIN — BUPIVACAINE HYDROCHLORIDE 4 ML: 2.5 INJECTION, SOLUTION EPIDURAL; INFILTRATION; INTRACAUDAL; PERINEURAL at 17:42

## 2021-04-22 RX ADMIN — SODIUM CHLORIDE 5 MILLION UNITS: 900 INJECTION INTRAVENOUS at 11:24

## 2021-04-22 RX ADMIN — SODIUM CHLORIDE 2.5 MILLION UNITS: 9 INJECTION, SOLUTION INTRAVENOUS at 23:51

## 2021-04-22 RX ADMIN — ACETAMINOPHEN 650 MG: 325 TABLET ORAL at 15:04

## 2021-04-22 RX ADMIN — SODIUM CHLORIDE, POTASSIUM CHLORIDE, SODIUM LACTATE AND CALCIUM CHLORIDE: 600; 310; 30; 20 INJECTION, SOLUTION INTRAVENOUS at 11:25

## 2021-04-22 ASSESSMENT — PATIENT HEALTH QUESTIONNAIRE - PHQ9
2. FEELING DOWN, DEPRESSED, IRRITABLE, OR HOPELESS: NOT AT ALL
SUM OF ALL RESPONSES TO PHQ9 QUESTIONS 1 AND 2: 0
SUM OF ALL RESPONSES TO PHQ9 QUESTIONS 1 AND 2: 0
1. LITTLE INTEREST OR PLEASURE IN DOING THINGS: NOT AT ALL
1. LITTLE INTEREST OR PLEASURE IN DOING THINGS: NOT AT ALL
2. FEELING DOWN, DEPRESSED, IRRITABLE, OR HOPELESS: NOT AT ALL

## 2021-04-22 ASSESSMENT — LIFESTYLE VARIABLES
HAVE YOU EVER FELT YOU SHOULD CUT DOWN ON YOUR DRINKING: NO
HOW MANY TIMES IN THE PAST YEAR HAVE YOU HAD 5 OR MORE DRINKS IN A DAY: 0
AVERAGE NUMBER OF DAYS PER WEEK YOU HAVE A DRINK CONTAINING ALCOHOL: 0
ON A TYPICAL DAY WHEN YOU DRINK ALCOHOL HOW MANY DRINKS DO YOU HAVE: 0
ALCOHOL_USE: NO
EVER FELT BAD OR GUILTY ABOUT YOUR DRINKING: NO
EVER HAD A DRINK FIRST THING IN THE MORNING TO STEADY YOUR NERVES TO GET RID OF A HANGOVER: NO
DOES PATIENT WANT TO STOP DRINKING: NO
CONSUMPTION TOTAL: NEGATIVE
EVER_SMOKED: NEVER
HAVE PEOPLE ANNOYED YOU BY CRITICIZING YOUR DRINKING: NO
TOTAL SCORE: 0

## 2021-04-22 ASSESSMENT — PAIN DESCRIPTION - PAIN TYPE: TYPE: ACUTE PAIN

## 2021-04-22 ASSESSMENT — FIBROSIS 4 INDEX: FIB4 SCORE: 0.63

## 2021-04-22 NOTE — H&P
[facility] DELIV[facility] HISTORY AND PHYSICAL    PATIENT ID:  NAME:  Luba Lind  MRN:               [id number]  YOB: 1990    CC: elective IOL    HPI:  Luba Lind is a 30 y.o. female  at 40w0d by a 8 week ultrasound performed on 20 consistent with LMP on Patient's last menstrual period was 2020 (exact date).. Estimated Date of Delivery: 21  Patient presents complaining of occasional uterine contractions, with no loss of fluid.  Normal fetal movement.  no vaginal bleeding.  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    ROS: Patient denies any fever chills, nausea, vomiting, headache, chest pain, shortness of breath, or dysuria or unusual swelling of hands or feet.     Prenatal Care: Obtained at [facility], 1st visit 20 with 15 total visits.  Third trimester BPs were approximately 110/70.  Total weight gain 17 kg during the pregnancy.    Prenatal Labs:   HepBsAg: NR HIV: NR Rubella: Immune   RPR: NR PAP: neg GBS: POS   GC/CT: neg O-/ Ab POS Quad Screen: none   No results for input(s): WBC, RBC, HEMOGLOBIN, HEMATOCRIT, MCV, MCH, RDW, PLATELETCT, MPV, NEUTSPOLYS, LYMPHOCYTES, MONOCYTES, EOSINOPHILS, BASOPHILS, RBCMORPHOLO in the last 72 hours.  No results for input(s): SODIUM, POTASSIUM, CHLORIDE, CO2, GLUCOSE, BUN, CPKTOTAL in the last 72 hours.      POB Hx:  OB History    Para Term  AB Living   4 3 3     3   SAB TAB Ectopic Molar Multiple Live Births             3      # Outcome Date GA Lbr Anthony/2nd Weight Sex Delivery Anes PTL Lv   4 Current            3 Term 16 38w0d   F    JOSE      Birth Comments: Pt states no complications   2 Term 12 38w2d  3.345 kg (7 lb 6 oz) F Vag-Spont EPI N JOSE   1 Term 01/04/10 40w0d  3.969 kg (8 lb 12 oz) M Vag-Spont EPI  JOSE      Birth Comments: denies complications.       PMH/Problem List:    Past Medical History:   Diagnosis Date   • Anemia affecting pregnancy 2021   • Fetal pyelectasis and EIC.  negative maternal quad screen. declined amniocentesis 2015   • Headache(784.0)     before and after pregnancy.   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     No current outpatient medications on file prior to encounter.       PSH:    Past Surgical History:   Procedure Laterality Date   • OTHER      14 y/o endoscopy; GI bleed resulting       Allergies:   No Known Allergies    SH:  Social History     Socioeconomic History   • Marital status: Single     Spouse name: Not on file   • Number of children: Not on file   • Years of education: Not on file   • Highest education level: Not on file   Occupational History   • Not on file   Tobacco Use   • Smoking status: Former Smoker     Packs/day: 0.00     Years: 0.00     Pack years: 0.00     Types: Cigarettes     Quit date: 2012     Years since quittin.2   • Smokeless tobacco: Never Used   • Tobacco comment: 1 cig every week or 2 weeks.   Substance and Sexual Activity   • Alcohol use: Not Currently     Comment: 1-2x/month prior to current pregnancy   • Drug use: Not Currently     Types: Marijuana, Inhaled     Comment: Last smoked Marijuana  08/20   • Sexual activity: Yes     Partners: Male     Birth control/protection: None   Other Topics Concern   • Not on file   Social History Narrative   • Not on file     Social Determinants of Health     Financial Resource Strain:    • Difficulty of Paying Living Expenses:    Food Insecurity: Unknown   • Worried About Running Out of Food in the Last Year: Patient refused   • Ran Out of Food in the Last Year: Patient refused   Transportation Needs: Unknown   • Lack of Transportation (Medical): Patient refused   • Lack of Transportation (Non-Medical): Patient refused   Physical Activity:    • Days of Exercise per Week:    • Minutes of Exercise per Session:    Stress:    • Feeling of Stress :    Social Connections:    • Frequency of Communication with Friends and Family:    • Frequency of Social Gatherings with Friends and Family:    • Attends [employer] Services:    • Active Member of Clubs or Organizations:    • Attends Club or Organization Meetings:    • Marital Status:    Intimate Partner Violence:    • Fear of Current or Ex-Partner:    • Emotionally Abused:    • Physically Abused:    • Sexually Abused:          PHYSICAL EXAM:  There were no vitals filed for this visit.  No data recorded.    General: No acute distress, resting comfortably in bed.  HEENT: normocephalic, nontraumatic, PERRLA, EOMI  Cardiovascular: Heart RRR with no murmurs, rubs or gallops. Distal Pulses 2+  Respiratory: symmetric chest expansion, lungs CTA bilaterally with no wheezes rales or rhonci  Abdomen: gravid, nontender  Musculoskeletal: strength 5/5 in four extremities  Neuro: non focal with no numbness, tingling or changes in sensation    SVE: 1-2/50%/-2  [place]: Q6-7 minutes; EFM: baseline 150 with accels to 180, no decels. Cat I tracing    A/P: Intrauterine pregancy at 40w0d weeks in latent labor here for IOL.    1. IUP at term  2. Patient is GBS pos, will start penicillin now  3. Contractions are spaced about 6-7 minutes apart, patient is  not feeling them very well. Induction of labor with cytotec.  4. Anticipating

## 2021-04-22 NOTE — PROGRESS NOTES
"0925- Patient admitted for planned IOL. Patient denying any leakage of fluid, pain, and vaginal bleeding. Patient reports + FM. Patient is GBS+. EFM and TOCO applied and reading well. VSS  0935- Cervical check performed 2/50/-2  0940- Dr. Theodore made aware of patient arrival and status. He will discuss POC with Dr. Calhoun.  1000- Dr. Theodore at bedside, vertex presentation confirmed via U/S  1124- PCN started per order for GBS+  1127- Pitocin initiated per order  1610- Dr. Calhoun made aware of patient having the feeling of being \"wet\".   1615-  at bedside for cervical check, 3/70/-2. Small amount of clear fluid noted to pad. Dr. Calhoun will readdress AROM after epidural placement.   1730- Dr. Hurt at bedside to place epidural   1820- Decker placed. Patient tolerated well.  1827- 250 bolus initiated for BP of 84/54, patient asymptotic and fetal heart tones stable at this time.   1850- Patient BP improved. Patient nauseous with 1 bout of emesis. Patient declining intervention at this time.   1900- Report given to Melba DONOHUE.   "

## 2021-04-22 NOTE — PROGRESS NOTES
1440- Pt c/o ARREDONDO. Dr Theodore made aware and orders received for 650 mg of PO Tylenol.     1820- Decker cath placed and pt tolerated well, draining clear yellow urine to gravity.     1827- 250 LR bolus started for pt BP 84/54. Pt is asymptomatic and fetal hear tracing is stable.

## 2021-04-22 NOTE — CARE PLAN
Problem: Infection  Goal: Will remain free from infection  Outcome: PROGRESSING AS EXPECTED  Note: Patient afebrile. Patient has no signs or symptoms of infection. Antibiotics initiated per order for GBS+.      Problem: Pain  Goal: Alleviation of Pain or a reduction in pain to the patient's comfort goal  Outcome: PROGRESSING AS EXPECTED  Note: Patient comfortable at this time. Patient educated on pain management options. Patient encouraged to call when pain worsens.

## 2021-04-22 NOTE — PROGRESS NOTES
[facility]IV[facility] PROGRESS NOTE    PATIENT ID:  NAME:  Luba Lind  MRN:               [id number]  YOB: 1990     30 y.o. female  at 40w0d.    Subjective: Patient resting comfortably in bed. Feeling contractions, however they are not very painful. States that she felt some leaking of fluid earlier, thinks her membranes may have ruptured.    Objective:    Vitals:    21 1059 21 1101 21 1127 21 1222   BP: 125/60  107/66 103/60   Pulse: 81 81 86 81   Resp: 20      Temp: 36.6 °C (97.9 °F)      TempSrc: Temporal      SpO2: 94% 93%     Weight:       Height:           Cervix:  3cm/70%/-2. Bag intact on fetal head.  [place]: Uterine Contractions Q2 minutes.   FHRM: Baseline 140, mod variability, Accels to 180, no decels  Pitocin: 10mU/min  Pain control: None    Assessment: 30 y.o. female    at 40w0d.    Plan:   1. Labor: Latent phase  2. IUP: Category I tracing, vertex presentation.  GBS positive s/p 2 doses PCN  3. Anticipate   4. Likely place epidural soon, try to rupture membranes after placed

## 2021-04-23 LAB
ERYTHROCYTE [DISTWIDTH] IN BLOOD BY AUTOMATED COUNT: 50.1 FL (ref 35.9–50)
HCT VFR BLD AUTO: 33.6 % (ref 37–47)
HGB BLD-MCNC: 11 G/DL (ref 12–16)
MCH RBC QN AUTO: 27.8 PG (ref 27–33)
MCHC RBC AUTO-ENTMCNC: 32.7 G/DL (ref 33.6–35)
MCV RBC AUTO: 85.1 FL (ref 81.4–97.8)
NUMBER OF RH DOSES IND 8505RD: NORMAL
PLATELET # BLD AUTO: 159 K/UL (ref 164–446)
PMV BLD AUTO: 11 FL (ref 9–12.9)
RBC # BLD AUTO: 3.95 M/UL (ref 4.2–5.4)
WBC # BLD AUTO: 11.1 K/UL (ref 4.8–10.8)

## 2021-04-23 PROCEDURE — A9270 NON-COVERED ITEM OR SERVICE: HCPCS | Performed by: NURSE PRACTITIONER

## 2021-04-23 PROCEDURE — 36415 COLL VENOUS BLD VENIPUNCTURE: CPT

## 2021-04-23 PROCEDURE — 770002 HCHG ROOM/CARE - OB PRIVATE (112)

## 2021-04-23 PROCEDURE — 59409 OBSTETRICAL CARE: CPT

## 2021-04-23 PROCEDURE — 85027 COMPLETE CBC AUTOMATED: CPT

## 2021-04-23 PROCEDURE — 304965 HCHG RECOVERY SERVICES

## 2021-04-23 PROCEDURE — 700102 HCHG RX REV CODE 250 W/ 637 OVERRIDE(OP): Performed by: NURSE PRACTITIONER

## 2021-04-23 PROCEDURE — 303615 HCHG EPIDURAL/SPINAL ANESTHESIA FOR LABOR

## 2021-04-23 PROCEDURE — 59400 OBSTETRICAL CARE: CPT | Performed by: NURSE PRACTITIONER

## 2021-04-23 PROCEDURE — 700111 HCHG RX REV CODE 636 W/ 250 OVERRIDE (IP): Performed by: OBSTETRICS & GYNECOLOGY

## 2021-04-23 RX ORDER — DOCUSATE SODIUM 100 MG/1
100 CAPSULE, LIQUID FILLED ORAL 2 TIMES DAILY PRN
Status: DISCONTINUED | OUTPATIENT
Start: 2021-04-23 | End: 2021-04-25 | Stop reason: HOSPADM

## 2021-04-23 RX ORDER — IBUPROFEN 600 MG/1
600 TABLET ORAL EVERY 6 HOURS PRN
Status: DISCONTINUED | OUTPATIENT
Start: 2021-04-23 | End: 2021-04-25 | Stop reason: HOSPADM

## 2021-04-23 RX ORDER — SODIUM CHLORIDE, SODIUM LACTATE, POTASSIUM CHLORIDE, CALCIUM CHLORIDE 600; 310; 30; 20 MG/100ML; MG/100ML; MG/100ML; MG/100ML
INJECTION, SOLUTION INTRAVENOUS PRN
Status: DISCONTINUED | OUTPATIENT
Start: 2021-04-23 | End: 2021-04-25 | Stop reason: HOSPADM

## 2021-04-23 RX ORDER — BISACODYL 10 MG
10 SUPPOSITORY, RECTAL RECTAL PRN
Status: DISCONTINUED | OUTPATIENT
Start: 2021-04-23 | End: 2021-04-25 | Stop reason: HOSPADM

## 2021-04-23 RX ORDER — ACETAMINOPHEN 325 MG/1
650 TABLET ORAL EVERY 4 HOURS PRN
Status: DISCONTINUED | OUTPATIENT
Start: 2021-04-23 | End: 2021-04-25 | Stop reason: HOSPADM

## 2021-04-23 RX ORDER — VITAMIN A ACETATE, BETA CAROTENE, ASCORBIC ACID, CHOLECALCIFEROL, .ALPHA.-TOCOPHEROL ACETATE, DL-, THIAMINE MONONITRATE, RIBOFLAVIN, NIACINAMIDE, PYRIDOXINE HYDROCHLORIDE, FOLIC ACID, CYANOCOBALAMIN, CALCIUM CARBONATE, FERROUS FUMARATE, ZINC OXIDE, CUPRIC OXIDE 3080; 12; 120; 400; 1; 1.84; 3; 20; 22; 920; 25; 200; 27; 10; 2 [IU]/1; UG/1; MG/1; [IU]/1; MG/1; MG/1; MG/1; MG/1; MG/1; [IU]/1; MG/1; MG/1; MG/1; MG/1; MG/1
1 TABLET, FILM COATED ORAL
Status: DISCONTINUED | OUTPATIENT
Start: 2021-04-23 | End: 2021-04-25 | Stop reason: HOSPADM

## 2021-04-23 RX ADMIN — IBUPROFEN 600 MG: 600 TABLET ORAL at 08:59

## 2021-04-23 RX ADMIN — IBUPROFEN 600 MG: 600 TABLET ORAL at 22:16

## 2021-04-23 RX ADMIN — ACETAMINOPHEN 650 MG: 325 TABLET ORAL at 20:20

## 2021-04-23 RX ADMIN — PRENATAL WITH FERROUS FUM AND FOLIC ACID 1 TABLET: 3080; 920; 120; 400; 22; 1.84; 3; 20; 10; 1; 12; 200; 27; 25; 2 TABLET ORAL at 08:59

## 2021-04-23 RX ADMIN — DOCUSATE SODIUM 100 MG: 100 CAPSULE, LIQUID FILLED ORAL at 08:59

## 2021-04-23 RX ADMIN — OXYTOCIN 125 ML/HR: 10 INJECTION, SOLUTION INTRAMUSCULAR; INTRAVENOUS at 03:47

## 2021-04-23 RX ADMIN — IBUPROFEN 600 MG: 600 TABLET ORAL at 15:01

## 2021-04-23 ASSESSMENT — EDINBURGH POSTNATAL DEPRESSION SCALE (EPDS)
THINGS HAVE BEEN GETTING ON TOP OF ME: NO, I HAVE BEEN COPING AS WELL AS EVER
THE THOUGHT OF HARMING MYSELF HAS OCCURRED TO ME: NEVER
I HAVE BEEN ANXIOUS OR WORRIED FOR NO GOOD REASON: NO, NOT AT ALL
I HAVE BEEN SO UNHAPPY THAT I HAVE HAD DIFFICULTY SLEEPING: NOT AT ALL
I HAVE BEEN SO UNHAPPY THAT I HAVE BEEN CRYING: NO, NEVER
I HAVE BLAMED MYSELF UNNECESSARILY WHEN THINGS WENT WRONG: NO, NEVER
I HAVE BEEN ABLE TO LAUGH AND SEE THE FUNNY SIDE OF THINGS: AS MUCH AS I ALWAYS COULD
I HAVE LOOKED FORWARD WITH ENJOYMENT TO THINGS: AS MUCH AS I EVER DID
I HAVE FELT SCARED OR PANICKY FOR NO GOOD REASON: NO, NOT AT ALL
I HAVE FELT SAD OR MISERABLE: NO, NOT AT ALL

## 2021-04-23 ASSESSMENT — PAIN DESCRIPTION - PAIN TYPE
TYPE: ACUTE PAIN

## 2021-04-23 ASSESSMENT — PAIN SCALES - GENERAL: PAIN_LEVEL: 0

## 2021-04-23 NOTE — CARE PLAN
Problem: Pain  Goal: Alleviation of Pain or a reduction in pain to the patient's comfort goal  Outcome: PROGRESSING AS EXPECTED  Note: Pt resting comfortably with epidural in place     Problem: Risk for Infection, Impaired Wound Healing  Goal: Remain free from signs and symptoms of infection  Outcome: PROGRESSING AS EXPECTED  Note: No s/s of infection noted, pt remains afebrile

## 2021-04-23 NOTE — ANESTHESIA POSTPROCEDURE EVALUATION
Patient: Luba Lind    Procedure Summary     Date: 04/22/21 Room / Location:     Anesthesia Start: 1734 Anesthesia Stop: 04/23/21 0140    Procedure: Labor Epidural Diagnosis:     Scheduled Providers:  Responsible Provider: Adrian Hurt D.O.    Anesthesia Type: epidural ASA Status: 2          Final Anesthesia Type: epidural  Last vitals  BP   Blood Pressure: 119/58    Temp   36.6 °C (97.9 °F)    Pulse   (!) 108   Resp   20    SpO2   95 %      Anesthesia Post Evaluation    Patient location during evaluation: floor  Patient participation: complete - patient participated  Level of consciousness: awake and alert  Pain score: 0    Airway patency: patent  Anesthetic complications: no  Cardiovascular status: hemodynamically stable  Respiratory status: acceptable  Hydration status: euvolemic    PONV: none          No complications documented.     Nurse Pain Score: 0 (NPRS)

## 2021-04-23 NOTE — ANESTHESIA TIME REPORT
Anesthesia Start and Stop Event Times     Date Time Event    4/22/2021 1732 Ready for Procedure     1734 Anesthesia Start    4/23/2021 0140 Anesthesia Stop        Responsible Staff  04/22/21 to 04/23/21    Name Role Begin End    Adrian Hurt D.O. Anesth 1734 0140        Preop Diagnosis (Free Text):  Pre-op Diagnosis             Preop Diagnosis (Codes):    Post op Diagnosis  Normal delivery      Premium Reason  A. 3PM - 7AM    Comments:

## 2021-04-23 NOTE — PROGRESS NOTES
0700: Report received from JAYDE Kuhn and assumed care of patient.     0900: Patient assessment completed. Discussed pain management plan and patient requests medications be offered as available. Patient denies dizziness and headaches; states she is voiding w/o difficulty. Reviewed plan of care, all questions answered, and rounding in place.

## 2021-04-23 NOTE — PROGRESS NOTES
2215) Bedside report received from AZALIA Olivarez RN, POC discussed, assumed care of patient at this time  2320) AZALIA ORTIZ at bedside, SVE by provider - /1, patient repositioned, FHT down to 80s after repositioned, patient repositioned from right wedged to left wedged and then far left wedged and FHT back up to 125 at 2327 (FHT down for a total of 6 minutes)  0020) Patient feeling breakthrough pain - no relief with epidural bolus button, report to Dr. Licha MD at bedside, epidural bolus administered by MD at this time  0050) Patient feeling increased pelvic pressure, AZALIA ORTIZ at bedside - SVE: C/0  0109) AZALIA ORTIZ at bedside, pushing with patient with provider present  0140)  viable female infant, APGARS 8/9  0147) Spontaneous delivery of intact placenta  0305) Bedside report to SHIRLENE Mae RN, POC discussed

## 2021-04-23 NOTE — PROGRESS NOTES
0305 Bedside report received from Jessica DONOHUE, assuming pt care at this time.    0415 Pt assisted to bathroom and voided    0425 Pt transferred to PP via wheelchair with infant in arms, pt and infant stable.    0435 Report given to Hattie DONOHUE at bedside, bands verified and cuddles active.

## 2021-04-23 NOTE — LACTATION NOTE
ALISA met with SAPNA for initial visit, she states she was not successful when she attempted to breastfeed her older children, SAPNA states this baby breastfeeds well, she denies pain when she breastfeeds and declines offer for assistance at this time, she reports 30 minute breastfeeding lengths, reminded of the importance of a deep latch and the damage caused by a shallow latch and instructed to call for latch assistance as needed

## 2021-04-23 NOTE — ANESTHESIA PREPROCEDURE EVALUATION
@ 40w, IUP, [place]  Relevant Problems   CARDIAC   (+) Migraine without aura or status migrainosus      Other   (+) Hyperemesis affecting pregnancy, antepartum   (+) Neurofibromatosis, type 1 (von Recklinghausen's disease) ([facility])       Physical Exam    Airway   Mallampati: II  TM distance: >3 FB  Neck ROM: full       Cardiovascular - normal exam  Rhythm: regular  Rate: normal  (-) murmur     Dental - normal exam           Pulmonary - normal exam  Breath sounds clear to auscultation     Abdominal    Neurological - normal exam                 Anesthesia Plan    ASA 2       Plan - epidural   Neuraxial block will be labor analgesia                  Pertinent diagnostic labs and testing reviewed    Informed Consent:    Anesthetic plan and risks discussed with patient.

## 2021-04-23 NOTE — L&D DELIVERY NOTE
OB Vaginal Delivery Note    2021  Luba Lind  30 y.o.    Patient was admitted to Labor and Delivery at 40w1d for induction of labor due to elective    Review the Delivery Report for details.     Gestational Age: 40w1d  /Para:   Labor Complications: None   Blood Loss: 250ml   IO Blood Loss  21 1340 - 21 0158    Est. Blood Loss Hospital Encounter 250 mL    Total  250 mL        Delivery Type: Vaginal, Spontaneous   ROM to Delivery Time: 5h 48m  [place] Sex: Female   Weight:  pending   1 Minute 5 Minute 10 Minute   Apgar Totals: 8   9          Delivery Details:  yolis Lewis 30 y.o.  female delivered a viable Female infant at 0140  with Apgars of 8  and 9 . Patient progressed to fully dilated with pitocin and AROM and feeling vaginal pressure.   The patient was put in the dorsal lithotomy position. Delivery was via Vaginal, Spontaneous  to a sterile field under Epidural  anesthesia. Infant delivered in Vertex   Right  Occiput  Anterior compound position with the posterior hand presenting with the face. Anterior and posterior shoulders delivered without difficulty. The infant was placed on the maternal abdomen and dried and stimulated by the RN.     The cord was double clamped after 4 minutes, cut by the FOB and 3 Vessels  were noted. No cord complications. Cord blood was not obtained.  Intact  placenta delivered at 2021  1:47 AM . Placental disposition: discarded . 20 units pitocin in 1000ml LR was initiated rapid IV.  Fundal massage performed and fundus found to be firm. Perineum, vagina, cervix were inspected, and no lacerations were noted.    Infant and patient in delivery room in good and stable condition.   GERI [place].   Dr Calhoun attending.

## 2021-04-23 NOTE — DISCHARGE PLANNING
Discharge Planning Assessment Post Partum    Reason for Referral: Hx of marijuana use   Address: [address]    Type of Living Situation: Lives with s/o and three children   Mom Diagnosis: Vaginal  Baby Diagnosis: Apgar 8/9  Primary Language: English    Name of Baby: Brennan Still  Father of the Baby: Franko Still  Involved in baby’s care? Yes, FOB at bedside  Contact Information: [phone]    Prenatal Care: Yes  Mom's PCP: Sudha Cornelius  PCP for new baby: Dr. 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   Other children in the home/names & ages: Three children ages 11, 8, and 5    Financial Hardship/Income: None reported  Mom's Mental status: A&O  Services used prior to admit: SNAP, WIC, Medicaid    CPS History: None  Psychiatric History: None  Domestic Violence History: No  Drug/ETOH History: MOB reports marijuana use prior to pregnancy. MOB states she didn't use throughout pregnancy and doesn't plan on using. Infants UDS is pending.     Resources Provided: None  Referrals Made: None     Clearance for Discharge: Pending UDS results- If negative, infant is cleared to d/c with MOB

## 2021-04-23 NOTE — PROGRESS NOTES
Patient admitted to unit to room 325 from L&D. Received report from Merle DONOHUE. Assumed care of patient. Assessment complete. Fundus is firm, at the umbilicus, with light lochia rubra. Denies any pain at this time, patient verbalized that she will call for medication on an as needed basis. Patient and FOB oriented to room and procedures, emergency light, call bell, infant I&O sheet, infant sleep safety, identification badges, and to call for assistance to bathroom. ID bands verified with Merle DONOHUE, cuddles on an blinking. Patient and FOB verbalized understanding, all questions addressed and answered. Bed is locked and in low position. Call light left within reach and encouraged to call for any needs if necessary.

## 2021-04-23 NOTE — CARE PLAN
Problem: Altered physiologic condition related to immediate post-delivery state and potential for bleeding/hemorrhage  Goal: Patient physiologically stable as evidenced by normal lochia, palpable uterine involution and vital signs within normal limits  Outcome: PROGRESSING AS EXPECTED  Note: Fundus firm, lochia light and rubra. No clots. Vitals stable.      Problem: Potential for postpartum infection related to presence of episiotomy/vaginal tear and/or uterine contamination  Goal: Patient will be absent from signs and symptoms of infection  Outcome: PROGRESSING AS EXPECTED  Note: At this time, no s/s of infection present on assessment. Patient afebrile. Will continue to monitor.

## 2021-04-23 NOTE — PROGRESS NOTES
EDC - 21 EGA - 40.0    190 - Report received from MARZENA Pace RN and CHANA Hodges RN. Pt resting comfortably with epidural.   AZALIA Luke CNM at bedside AROM clear fluid, SVE 3/80/-2. Pt repositioned onto left side with peanut ball   Report given to HEATHER Palacio RN

## 2021-04-23 NOTE — PROGRESS NOTES
Luba Lind   40w0d    Subjective: Pt sleeping with epidural in place.      uterine contractions:yes, pain: .no  nausea/vomiting: .noepigastric pain:.no:      fetal movement: normal, vaginal bleeding: .no    Objective:   Vitals:    04/22/21 1758 04/22/21 1800 04/22/21 1801 04/22/21 1805   BP:   108/59    Pulse: (!) 111 87 96 82   Resp:       Temp:       TempSrc:       SpO2:  95%  95%   Weight:       Height:         Fetal heart variability: moderate  Fetal Heart Rate decelerations: none  Fetal Heart Rate accelerations: yes  Baseline FHR: 125 per minute  Contractions: every 1-3 minutes apart, some coupling  Cervical Exam 3/80/-2  Fetal position: Cephalic  Membranes: ruptured: .yes  AROM: .yes, Meconium: .no  IUPC: .no, FSE .no    Meds:     Epidural : .yes  Magnesium sulfate: .N\A  Pitocin: .yes at 12 mu/min    Labs:    Lab:   Recent Results (from the past 48 hour(s))   Hold Blood Bank Specimen (Not Tested)    Collection Time: 04/22/21 11:15 AM   Result Value Ref Range    Holding Tube - Bb DONE    CBC WITH DIFFERENTIAL    Collection Time: 04/22/21 11:15 AM   Result Value Ref Range    WBC 7.3 4.8 - 10.8 K/uL    RBC 4.51 4.20 - 5.40 M/uL    Hemoglobin 12.5 12.0 - 16.0 g/dL    Hematocrit 37.9 37.0 - 47.0 %    MCV 84.0 81.4 - 97.8 fL    MCH 27.7 27.0 - 33.0 pg    MCHC 33.0 (L) 33.6 - 35.0 g/dL    RDW 49.7 35.9 - 50.0 fL    Platelet Count 159 (L) 164 - 446 K/uL    MPV 10.5 9.0 - 12.9 fL    Neutrophils-Polys 78.60 (H) 44.00 - 72.00 %    Lymphocytes 13.70 (L) 22.00 - 41.00 %    Monocytes 6.20 0.00 - 13.40 %    Eosinophils 0.70 0.00 - 6.90 %    Basophils 0.30 0.00 - 1.80 %    Immature Granulocytes 0.50 0.00 - 0.90 %    Nucleated RBC 0.00 /100 WBC    Neutrophils (Absolute) 5.75 2.00 - 7.15 K/uL    Lymphs (Absolute) 1.00 1.00 - 4.80 K/uL    Monos (Absolute) 0.45 0.00 - 0.85 K/uL    Eos (Absolute) 0.05 0.00 - 0.51 K/uL    Baso (Absolute) 0.02 0.00 - 0.12 K/uL    Immature Granulocytes (abs) 0.04 0.00 - 0.11 K/uL    NRBC  (Absolute) 0.00 K/uL       Ass:   40w0d elective IOL on pitocin  Labor State: Early latent labor. and Satisfactory labor progress.  GBS positive x 3 doses Pen G  Category 1 FHT    P.   1. Anticipate   2. Arom performed easily for clear fluid  3. Continue with pitocin per protocol and position changes  4. Will reassess 3-4 hours or PRN.

## 2021-04-23 NOTE — ANESTHESIA PROCEDURE NOTES
Epidural Block    Date/Time: 4/22/2021 5:38 PM  Performed by: Adrian Hurt D.O.  Authorized by: Adrian Hurt D.O.     Patient Location:  OB  Start Time:  4/22/2021 5:38 PM  End Time:  4/22/2021 5:42 PM  Reason for Block: labor analgesia    patient identified, IV checked, site marked, risks and benefits discussed, surgical consent, monitors and equipment checked, pre-op evaluation and timeout performed    Patient Position:  Sitting  Prep: ChloraPrep, patient draped and sterile technique    Monitoring:  Blood pressure, continuous pulse oximetry and heart rate  Approach:  Midline  Location:  L3-L4  Injection Technique:  ANA air  Skin infiltration:  Lidocaine  Strength:  1%  Dose:  3ml  Needle Type:  Tuohy  Needle Gauge:  17 G  Needle Length:  3.5 in  Loss of resistance::  4  Catheter Size:  19 G  Catheter at Skin Depth:  8  Test Dose Result:  Negative

## 2021-04-24 PROCEDURE — A9270 NON-COVERED ITEM OR SERVICE: HCPCS | Performed by: NURSE PRACTITIONER

## 2021-04-24 PROCEDURE — 700102 HCHG RX REV CODE 250 W/ 637 OVERRIDE(OP): Performed by: NURSE PRACTITIONER

## 2021-04-24 PROCEDURE — 770002 HCHG ROOM/CARE - OB PRIVATE (112)

## 2021-04-24 RX ADMIN — IBUPROFEN 600 MG: 600 TABLET ORAL at 09:01

## 2021-04-24 RX ADMIN — IBUPROFEN 600 MG: 600 TABLET ORAL at 15:12

## 2021-04-24 RX ADMIN — PRENATAL WITH FERROUS FUM AND FOLIC ACID 1 TABLET: 3080; 920; 120; 400; 22; 1.84; 3; 20; 10; 1; 12; 200; 27; 25; 2 TABLET ORAL at 09:01

## 2021-04-24 RX ADMIN — IBUPROFEN 600 MG: 600 TABLET ORAL at 20:45

## 2021-04-24 ASSESSMENT — PAIN DESCRIPTION - PAIN TYPE
TYPE: ACUTE PAIN

## 2021-04-24 NOTE — CARE PLAN
Problem: Knowledge Deficit  Goal: Knowledge of disease process/condition, treatment plan, diagnostic tests, and medications will improve  Outcome: PROGRESSING AS EXPECTED  Note: POC discussed, all questions answered. Call light within reach and instruction given on use, including emergency call feature.       Problem: Potential for postpartum infection related to presence of episiotomy/vaginal tear and/or uterine contamination  Goal: Patient will be absent from signs and symptoms of infection  Outcome: PROGRESSING AS EXPECTED  Note: Patient is afebrile and absent for other signs/symptoms of infection. Vital signs WDL.  Will continue to monitor patient condition.

## 2021-04-24 NOTE — PROGRESS NOTES
Post Partum Progress Note    Name:   Luba Lind   Date/Time:  4/24/2021 - 6:28 AM  Chief Admitting Dx:  Pregnancy with 38 completed weeks gestation [Z3A.38]  Delivery Type:  vaginal, spontaneous  Post-Op/Post Partum Days #:  1    Subjective:  Abdominal pain: no  Ambulating:   yes  Tolerating liquids:  yes  Tolerating food:  yes common adult  Flatus:   yes  BM:    no  Bleeding:   with a small amount of bleeding  Voiding:   yes  Dizziness:   no  Feeding:   breast    Vitals:    04/23/21 1800 04/23/21 2210 04/24/21 0159 04/24/21 0536   BP: 115/78 112/72 134/88 (!) 98/64   Pulse: 95 95 92 87   Resp: 20 17 17 17   Temp: 36.9 °C (98.4 °F) 36.7 °C (98.1 °F) 36.6 °C (97.9 °F) 37.1 °C (98.8 °F)   TempSrc: Temporal Temporal Temporal Temporal   SpO2:  97% 97% 95%   Weight:       Height:           Exam:  Breast: Tenderness no  Abdomen: Abdomen soft, non-tender. BS normal. No masses,  No organomegaly  Fundal Tenderness:  no  Fundus Firm: yes  Incision: none  Below umbilicus: yes  Perineum: perineum intact  Lochia: moderate  Extremities: Normal extremities, peripheral pulses and reflexes normal, no edema, redness or tenderness in the calves or thighs    Meds:  Current Facility-Administered Medications   Medication Dose   • LR infusion     • docusate sodium (COLACE) capsule 100 mg  100 mg   • bisacodyl (DULCOLAX) suppository 10 mg  10 mg   • magnesium hydroxide (MILK OF MAGNESIA) suspension 30 mL  30 mL   • prenatal plus vitamin (STUARTNATAL 1+1) 27-1 MG tablet 1 tablet  1 tablet   • ibuprofen (MOTRIN) tablet 600 mg  600 mg   • acetaminophen (Tylenol) tablet 650 mg  650 mg   • ondansetron (ZOFRAN ODT) dispertab 4 mg  4 mg    Or   • ondansetron (ZOFRAN) syringe/vial injection 4 mg  4 mg   • oxytocin (PITOCIN) infusion (for postpartum)   mL/hr       Labs:   Recent Labs     04/22/21  1115 04/23/21  0931   WBC 7.3 11.1*   RBC 4.51 3.95*   HEMOGLOBIN 12.5 11.0*   HEMATOCRIT 37.9 33.6*   MCV 84.0 85.1   MCH 27.7 27.8   MCHC  33.0* 32.7*   RDW 49.7 50.1*   PLATELETCT 159* 159*   MPV 10.5 11.0       Assessment:  Chief Admitting Dx:  Pregnancy with 38 completed weeks gestation [Z3A.38]  Delivery Type:  vaginal, spontaneous  Tubal Ligation:  no    Plan:  Continue routine post partum care.  GBS positive     GERI Bruno.

## 2021-04-24 NOTE — PROGRESS NOTES
2015  Assumed care of patient. Assessment complete. Fundus is firm, with scant lochia rubra. Denies passing of any clots at this time. Pt states she is in some pain at this time, medicated per MAR. PRN medications and frequency of administrations reviewed and discussed, pt verbalized understanding. Bed is locked and in low position. Call light left within reach and encouraged to call for any needs if necessary.     2215  RN called to room to talk about feedings. MOB states her breast are feeling very sore and painful as infant wants to be at the breast constantly due to cluster feeding. MOB states pain has significantly increased and would like to try some formula - Similac, to top infant off at this time. RN discussed cluster feeding and latch technique to get infant on deeper. MOB okay with starting formula for right now. Feeding guidelines for breast and bottle feeding discussed and reviewed, parents verbalized understanding.

## 2021-04-24 NOTE — CONSULTS
Met with MOB for an initial lactation visit.  MOB stated she attempted to breastfeed infant, but reported having extreme pain at her breasts with breastfeeding and has decided to provide infant with formula only at this time.  Latch assistance was offered, but MOB declined.  MOB denied tissue damage to her breasts with latch and believed infant was latched appropriately onto the breast during breastfeeds.    Provided drying measures for milk supply.    If SAPNA had any lactation questions and/or concerns post discharge, she was encouraged to follow up with the lactation peer counselor at [facility].  She stated she is seen at the [facility] office on [address] [place] [address] (Health Department) in [place], [place].    SAPNA was provided with a copy of the hospital supplementation guidelines by staff RN.

## 2021-04-24 NOTE — CARE PLAN
"  Problem: Alteration in comfort related to episiotomy, vaginal repair and/or after birth pains  Goal: Patient is able to ambulate, care for self and infant  Outcome: PROGRESSING AS EXPECTED  Note: Pt ambulating without difficulty. Gait steady. Denies dizziness and/or lightheadedness. Involved in infant cares.   Goal: Patient verbalizes acceptable pain level  Outcome: PROGRESSING AS EXPECTED  Note: Pt states pain \"comes and goes\", intermittent. Pain controlled with PRN meds and rest. Encouraged to call for further needs.      "

## 2021-04-24 NOTE — DISCHARGE PLANNING
Infants UDS positive for barbiturates. Per RN, positive result is due to medication MOB is on.     Infant is cleared to d/c with MOB when cleared

## 2021-04-25 VITALS
BODY MASS INDEX: 31.22 KG/M2 | OXYGEN SATURATION: 96 % | TEMPERATURE: 98.5 F | HEART RATE: 77 BPM | WEIGHT: 159 LBS | RESPIRATION RATE: 16 BRPM | HEIGHT: 60 IN | DIASTOLIC BLOOD PRESSURE: 61 MMHG | SYSTOLIC BLOOD PRESSURE: 94 MMHG

## 2021-04-25 PROBLEM — O21.0 HYPEREMESIS AFFECTING PREGNANCY, ANTEPARTUM: Status: RESOLVED | Noted: 2020-09-09 | Resolved: 2021-04-25

## 2021-04-25 PROBLEM — O09.899 SUPERVISION OF OTHER HIGH RISK PREGNANCY, ANTEPARTUM: Status: RESOLVED | Noted: 2021-02-10 | Resolved: 2021-04-25

## 2021-04-25 PROCEDURE — A9270 NON-COVERED ITEM OR SERVICE: HCPCS | Performed by: NURSE PRACTITIONER

## 2021-04-25 PROCEDURE — 700102 HCHG RX REV CODE 250 W/ 637 OVERRIDE(OP): Performed by: NURSE PRACTITIONER

## 2021-04-25 RX ORDER — IBUPROFEN 600 MG/1
600 TABLET ORAL EVERY 6 HOURS PRN
Qty: 30 TABLET | Refills: 0 | Status: SHIPPED | OUTPATIENT
Start: 2021-04-25 | End: 2021-05-06

## 2021-04-25 RX ORDER — ACETAMINOPHEN 325 MG/1
650 TABLET ORAL EVERY 4 HOURS PRN
Qty: 30 TABLET | Refills: 0 | COMMUNITY
Start: 2021-04-25

## 2021-04-25 RX ADMIN — PRENATAL WITH FERROUS FUM AND FOLIC ACID 1 TABLET: 3080; 920; 120; 400; 22; 1.84; 3; 20; 10; 1; 12; 200; 27; 25; 2 TABLET ORAL at 08:15

## 2021-04-25 NOTE — DISCHARGE SUMMARY
Discharge Summary:      Luba Lind    Admit Date:   2021  Discharge Date:  2021     Admitting diagnosis:  Pregnancy with 38 completed weeks gestation [Z3A.38]  Discharge Diagnosis: Status post vaginal, spontaneous.  Pregnancy Complications: none  Tubal Ligation:  no        History:  Past Medical History:   Diagnosis Date   • Anemia affecting pregnancy 2021   • Fetal pyelectasis and EIC. negative maternal quad screen. declined amniocentesis 2015   • Headache(784.0)     before and after pregnancy.   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     OB History    Para Term  AB Living   4 4 4     4   SAB TAB Ectopic Molar Multiple Live Births           0 4      # Outcome Date GA Lbr Anthony/2nd Weight Sex Delivery Anes PTL Lv   4 Term 21 40w1d / 00:50 3.495 kg (7 lb 11.3 oz) F Vag-Spont EPI N JOSE   3 Term 16 38w0d   F    JOSE      Birth Comments: Pt states no complications   2 Term 12 38w2d  3.345 kg (7 lb 6 oz) F Vag-Spont EPI N JOSE   1 Term 01/04/10 40w0d  3.969 kg (8 lb 12 oz) M Vag-Spont EPI  JOSE      Birth Comments: denies complications.        Patient has no known allergies.  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 , now para 4, was admitted with the above mentioned diagnosis, underwent Induction of Labor, vaginal, spontaneous. Patient postpartum course was unremarkable, with progressive advancement in diet , ambulation and  toleration of oral analgesia. Patient without complaints today and desires discharge.      Vitals:    04/23/21 2210 04/24/21 0159 04/24/21 0536 04/24/21 1800   BP: 112/72 134/88 (!) 98/64 117/78   Pulse: 95 92 87 96   Resp: 17 17 17 18   Temp: 36.7 °C (98.1 °F) 36.6 °C (97.9 °F) 37.1 °C (98.8 °F) 36.6 °C (97.8 °F)   TempSrc: Temporal Temporal Temporal Temporal   SpO2: 97% 97% 95% 98%   Weight:       Height:           Current Facility-Administered Medications   Medication Dose   • LR infusion     • docusate sodium (COLACE) capsule 100 mg  100 mg   • bisacodyl (DULCOLAX) suppository 10 mg  10 mg   • magnesium hydroxide (MILK OF MAGNESIA) suspension 30 mL  30 mL   • prenatal plus vitamin (STUARTNATAL 1+1) 27-1 MG tablet 1 tablet  1 tablet   • ibuprofen (MOTRIN) tablet 600 mg  600 mg   • acetaminophen (Tylenol) tablet 650 mg  650 mg   • ondansetron (ZOFRAN ODT) dispertab 4 mg  4 mg    Or   • ondansetron (ZOFRAN) syringe/vial injection 4 mg  4 mg   • oxytocin (PITOCIN) infusion (for postpartum)   mL/hr       Exam:  Breast Exam: negative  Abdomen: Abdomen soft, non-tender. BS normal. No masses,  No organomegaly  Fundus Non Tender: yes  Incision: none  Perineum: perineum intact  Extremity: extremities, peripheral pulses and reflexes normal     Labs:  Recent Labs     04/22/21  1115 04/23/21  0931   WBC 7.3 11.1*   RBC 4.51 3.95*   HEMOGLOBIN 12.5 11.0*   HEMATOCRIT 37.9 33.6*   MCV 84.0 85.1   MCH 27.7 27.8   MCHC 33.0* 32.7*   RDW 49.7 50.1*   PLATELETCT 159* 159*   MPV 10.5 11.0        Activity:   Discharge to home  Pelvic Rest x 6 weeks    Assessment:  normal postpartum course  Discharge Assessment: No heavy bleeding or foul vaginal discharge      Follow up: [facility] in 5 weeks for vaginal     Discharge Meds:   Current Outpatient Medications   Medication Sig Dispense Refill   • acetaminophen (TYLENOL) 325 MG Tab Take 2 Tablets by mouth every four hours as needed. 30 tablet 0   • ibuprofen (MOTRIN) 600  MG Tab Take 1 tablet by mouth every 6 hours as needed. 30 tablet 0     I reviewed in detail with patient  all indications that would necessitate emergency care. We reviewed bleeding expectations as well as expected healing with perineal repairs. We discussed birth control options and she is aware that can order this at her 6 week PP appointment. Finally, we reviewed postpartum depression and anxiety. She verbalizes understanding.  I have encouraged pelvic rest and use of condom if she does desire to have intercourse.      NITISH Bean

## 2021-04-25 NOTE — DISCHARGE INSTRUCTIONS
PATIENT DISCHARGE EDUCATION INSTRUCTION SHEET  REASONS TO CALL YOUR OBSTETRICIAN  · Persistent fever, shaking, chills (Temperature higher than 100.4) may indicate you have an infection  · Heavy bleeding: soaking more than 1 pad per hour; Passing clots an egg-sized clot or bigger may mean you have an postpartum hemorrhage  · Foul odor from vagina or bad smelling or discolored discharge or blood  · Breast infection (Mastitis symptoms); breast pain, chills, fever, redness or red streaks, may feel flu like symptoms  · Urinary pain, burning or frequency  · Incision that is not healing, increased redness, swelling, tenderness or pain, or any pus from episiotomy or  site may mean you have an infection  · Redness, swelling, warmth, or painful to touch in the calf area of your leg may mean you have a blood clot  · Severe or intensified depression, thoughts or feelings of wanting to hurt yourself or someone else   · Pain in chest, obstructed breathing or shortness of breath (trouble catching your breath) may mean you are having a postpartum complication. Call your provider immediately   · Headache that does not get better, even after taking medicine, a bad headache with vision changes or pain in the upper right area of your belly may mean you have high blood pressure or post birth preeclampsia. Call your provider immediately    HAND WASHING  All family and friends should wash their hands:  · Before and after holding the baby  · Before feeding the baby  · After using the restroom or changing the baby's diaper    WOUND CARE  Ask your physician for additional care instructions. In general:  ·  Incision:  · May shower and pat incision dry   · Keep the incision clean and dry  · There should not be any opening or pus from the incision  · Continue to walk at home 3 times a day   · Do NOT lift anything heavier than your baby (over 10 pounds)  · Encourage family to help participate in care of the  to allow  "rest and mom time to heal  · Episiotomy/Laceration  · May use angle-spray bottle, witch hazel pads and dermaplast spray for comfort  · Use angle-spray bottle after urinating to cleanse perineal area  · To prevent burning during urination spray angle-water bottle on labial area   · Pat perineal area dry until episiotomy/laceration is healed  · Continue to use angle-bottle until bleeding stops as needed  · If have a 2nd degree laceration or greater, a Sitz bath can offer relief from soreness, burning, and inflammation   · Sitz Bath   · Sit in 6 inches of warm water and soak laceration as needed until the laceration heals    VAGINAL CARE AND BLEEDING  · Nothing inside vagina for 6 weeks:   · No sexual intercourse, tampons or douching  · Bleeding may continue for 2-4 weeks. Amount and color may vary  · Soaking 1 pad or more in an hour for several hours is considered heavy bleeding  · Passing large egg sized blood clots can be concerning  · If you feel like you have heavy bleeding or are having increasing amount of blood clots call your Obstetrician immediately  · If you begin feeling faint upon standing, feeling sick to your stomach, have clammy skin, a really fast heartbeat, have chills, start feeling confused, dizzy, sleepy or weak, or feeling like you're going to faint call your Obstetrician immediately      URINATION AND BOWEL MOVEMENTS  · Eating more fiber (bran cereal, fruits, and vegetables) and drinking plenty of fluids will help to avoid constipation  · Urinary frequency and urgency after childbirth is normal  · If you experience any urinary pain, burning or frequency call your provider    BIRTH CONTROL  · It is possible to become pregnant at any time after delivery and while breastfeeding  · Plan to discuss a method of birth control with your physician at your post delivery follow up visit    POSTPARTUM BLUES  During the first few days after birth, you may experience a sense of the \"blues\" which may include " "impatience, irritability or even crying. These feelings come and go quickly. However, as many as 1 in 10 women experience emotional symptoms known as postpartum depression.     POSTPARTUM DEPRESSION    May start as early as the second or third day after delivery or take several weeks or months to develop. Symptoms of \"blues\" are present, but are more intense: Crying spells; loss of appetite; feelings of hopelessness or loss of control; fear of touching the baby; over concern or no concern at all about the baby; little or no concern about your own appearance/caring for yourself; and/or inability to sleep or excessive sleeping. Contact your Obstetrician if you are experiencing any of these symptoms     PREVENTING SHAKEN BABY  If you are angry or stressed, PUT THE BABY IN THE CRIB, step away, take some deep breaths, and wait until you are calm to care for the baby. DO NOT SHAKE THE BABY. You are not alone, call a supporter for help.  · [facility] 24/7 crisis call line (193-527-2497) or (1-929.540.8526)  · You can also text them, text \"ANSWER\" (998957)      "

## 2021-04-25 NOTE — CARE PLAN
Problem: Safety  Goal: Will remain free from falls  Outcome: PROGRESSING AS EXPECTED  Note: Patient ambulating without additional assistance. Patient wearing non-slip socks and room is cluttered free. Call light and personal items all within reach. Patient reminded on call light if assistance is needed. Will continue to monitor for safety.       Problem: Potential knowledge deficit related to lack of understanding of self and  care  Goal: Patient will demonstrate ability to care for self and infant  Outcome: PROGRESSING AS EXPECTED  Note: Patient is able to get up and move around. Patient demonstrates ability to provide care for herself and for infant. Patient feeding at scheduled times and assisting with infant care. Patient is progressing as expected.

## 2021-04-25 NOTE — PROGRESS NOTES
1900-Received report from JAYDE Lenz. Assumed patient care. POC for evening discussed with patient. No additional needs at this time.

## 2021-05-06 ENCOUNTER — GYNECOLOGY VISIT (OUTPATIENT)
Dept: OBGYN | Facility: CLINIC | Age: 31
End: 2021-05-06
Payer: MEDICAID

## 2021-05-06 VITALS — BODY MASS INDEX: 28.71 KG/M2 | DIASTOLIC BLOOD PRESSURE: 70 MMHG | WEIGHT: 147 LBS | SYSTOLIC BLOOD PRESSURE: 120 MMHG

## 2021-05-06 PROCEDURE — 99215 OFFICE O/P EST HI 40 MIN: CPT | Mod: 24 | Performed by: ADVANCED PRACTICE MIDWIFE

## 2021-05-06 ASSESSMENT — FIBROSIS 4 INDEX: FIB4 SCORE: 0.7

## 2021-05-10 ENCOUNTER — HOSPITAL ENCOUNTER (OUTPATIENT)
Dept: LAB | Facility: MEDICAL CENTER | Age: 31
End: 2021-05-10
Attending: ADVANCED PRACTICE MIDWIFE
Payer: MEDICAID

## 2021-05-10 LAB
BASOPHILS # BLD AUTO: 1.5 % (ref 0–1.8)
BASOPHILS # BLD: 0.06 K/UL (ref 0–0.12)
EOSINOPHIL # BLD AUTO: 0.31 K/UL (ref 0–0.51)
EOSINOPHIL NFR BLD: 7.8 % (ref 0–6.9)
ERYTHROCYTE [DISTWIDTH] IN BLOOD BY AUTOMATED COUNT: 41.2 FL (ref 35.9–50)
HCT VFR BLD AUTO: 42 % (ref 37–47)
HGB BLD-MCNC: 13.5 G/DL (ref 12–16)
IMM GRANULOCYTES # BLD AUTO: 0.02 K/UL (ref 0–0.11)
IMM GRANULOCYTES NFR BLD AUTO: 0.5 % (ref 0–0.9)
LYMPHOCYTES # BLD AUTO: 1.09 K/UL (ref 1–4.8)
LYMPHOCYTES NFR BLD: 27.3 % (ref 22–41)
MCH RBC QN AUTO: 27.8 PG (ref 27–33)
MCHC RBC AUTO-ENTMCNC: 32.1 G/DL (ref 33.6–35)
MCV RBC AUTO: 86.4 FL (ref 81.4–97.8)
MONOCYTES # BLD AUTO: 0.34 K/UL (ref 0–0.85)
MONOCYTES NFR BLD AUTO: 8.5 % (ref 0–13.4)
NEUTROPHILS # BLD AUTO: 2.17 K/UL (ref 2–7.15)
NEUTROPHILS NFR BLD: 54.4 % (ref 44–72)
NRBC # BLD AUTO: 0 K/UL
NRBC BLD-RTO: 0 /100 WBC
PLATELET # BLD AUTO: 245 K/UL (ref 164–446)
PMV BLD AUTO: 10.9 FL (ref 9–12.9)
RBC # BLD AUTO: 4.86 M/UL (ref 4.2–5.4)
WBC # BLD AUTO: 4 K/UL (ref 4.8–10.8)

## 2021-05-10 PROCEDURE — 80053 COMPREHEN METABOLIC PANEL: CPT

## 2021-05-10 PROCEDURE — 84443 ASSAY THYROID STIM HORMONE: CPT

## 2021-05-10 PROCEDURE — 85025 COMPLETE CBC W/AUTO DIFF WBC: CPT

## 2021-05-10 PROCEDURE — 36415 COLL VENOUS BLD VENIPUNCTURE: CPT

## 2021-05-10 PROCEDURE — 82306 VITAMIN D 25 HYDROXY: CPT

## 2021-05-11 LAB
ALBUMIN SERPL BCP-MCNC: 3.9 G/DL (ref 3.2–4.9)
ALBUMIN/GLOB SERPL: 1.3 G/DL
ALP SERPL-CCNC: 100 U/L (ref 30–99)
ALT SERPL-CCNC: 20 U/L (ref 2–50)
ANION GAP SERPL CALC-SCNC: 7 MMOL/L (ref 7–16)
AST SERPL-CCNC: 20 U/L (ref 12–45)
BILIRUB SERPL-MCNC: 0.3 MG/DL (ref 0.1–1.5)
BUN SERPL-MCNC: 9 MG/DL (ref 8–22)
CALCIUM SERPL-MCNC: 9.5 MG/DL (ref 8.5–10.5)
CHLORIDE SERPL-SCNC: 104 MMOL/L (ref 96–112)
CO2 SERPL-SCNC: 24 MMOL/L (ref 20–33)
CREAT SERPL-MCNC: 0.54 MG/DL (ref 0.5–1.4)
GLOBULIN SER CALC-MCNC: 2.9 G/DL (ref 1.9–3.5)
GLUCOSE SERPL-MCNC: 87 MG/DL (ref 65–99)
POTASSIUM SERPL-SCNC: 4 MMOL/L (ref 3.6–5.5)
PROT SERPL-MCNC: 6.8 G/DL (ref 6–8.2)
SODIUM SERPL-SCNC: 135 MMOL/L (ref 135–145)
TSH SERPL DL<=0.005 MIU/L-ACNC: 0.39 UIU/ML (ref 0.38–5.33)

## 2021-05-12 LAB — 25(OH)D3 SERPL-MCNC: 30 NG/ML (ref 30–80)

## 2021-05-14 ENCOUNTER — TELEMEDICINE (OUTPATIENT)
Dept: OBGYN | Facility: CLINIC | Age: 31
End: 2021-05-14
Payer: MEDICAID

## 2021-05-14 DIAGNOSIS — F43.21 GRIEF AT LOSS OF CHILD: ICD-10-CM

## 2021-05-14 DIAGNOSIS — Z63.4 GRIEF AT LOSS OF CHILD: ICD-10-CM

## 2021-05-14 PROCEDURE — 99213 OFFICE O/P EST LOW 20 MIN: CPT | Mod: CR | Performed by: ADVANCED PRACTICE MIDWIFE

## 2021-05-14 RX ORDER — HYDROXYZINE HYDROCHLORIDE 10 MG/1
TABLET, FILM COATED ORAL
Qty: 40 TABLET | Refills: 0 | Status: SHIPPED | OUTPATIENT
Start: 2021-05-14 | End: 2021-06-26

## 2021-05-14 SDOH — SOCIAL STABILITY - SOCIAL INSECURITY: DISSAPEARANCE AND DEATH OF FAMILY MEMBER: Z63.4

## 2021-05-14 NOTE — PROGRESS NOTES
"  Telephone Appointment Visit   As a means of avoiding spread of COVID-19, this visit is being conducted by telephone. This telephone visit was initiated by the patient and they verbally consented.    Time at start of call: 401    Reason for Call:  Symptom Follow-up    HPI:    Pt presents for follow up. As previously noted, patient was being followed for postpartum mood changes. At the time of her appointment one week ago, it was noted that her partner's older song was missing in [place]. Since that time, it has been discovered that the child was murdered by that mother's then boyfriend.   Luba in current in [place] with her partner addressing this ongoing tragic event.    Luba is not sleeping well and has had recurrent worry regarding the death of this child. She also admits to anxiety that is on and off. It is not preventing her from bonding with her .   She reports that she is trying to stay positive and supportive to her partner and their children but that she is \"hurt by it all.\"    Labs / Images Reviewed:   CBC, TSH, CMP, Vitamin D     Assessment and Plan:     1. Postpartum mood disturbance    2. Grief at loss of child    Other orders  - hydrOXYzine HCl (ATARAX) 10 MG Tab; Take 1-2 tabs PO QHS PRN for insomnia or panic; may take 1 tab PO BID for panic  Dispense: 40 tablet; Refill: 0    Discussed PRN use of atarax with patient. She is not breastfeeding and thus concern of decreased breast milk supply is limited. However, irritability and anxiety can increase from lack of sleep. I have encouraged her to trial this at night at first.     We discussed that grief is variable for each individual. While her feelings may be mild at this time, this can change especially as she is in the immediate postpartum period. She voices understanding and is open to starting a daily medication if appropriate for her.       Follow-up: 1 week    Time at end of call: 416  Total Time Spent: 11-20 minutes    Neel" NITISH Ulrich

## 2021-05-21 ENCOUNTER — TELEMEDICINE (OUTPATIENT)
Dept: OBGYN | Facility: CLINIC | Age: 31
End: 2021-05-21
Payer: MEDICAID

## 2021-05-21 DIAGNOSIS — Z63.4 GRIEF AT LOSS OF CHILD: ICD-10-CM

## 2021-05-21 DIAGNOSIS — F43.21 GRIEF AT LOSS OF CHILD: ICD-10-CM

## 2021-05-21 PROCEDURE — 99213 OFFICE O/P EST LOW 20 MIN: CPT | Mod: CR,24 | Performed by: ADVANCED PRACTICE MIDWIFE

## 2021-05-21 SDOH — SOCIAL STABILITY - SOCIAL INSECURITY: DISSAPEARANCE AND DEATH OF FAMILY MEMBER: Z63.4

## 2021-05-21 NOTE — PROGRESS NOTES
"  Telephone Appointment Visit   As a means of avoiding spread of COVID-19, this visit is being conducted by telephone. This telephone visit was initiated by the patient and they verbally consented.    Time at start of call:     Reason for Call:  Medication Follow-up    HPI:    Patient reports that she did  atarax and has started using it as needed. She reports that it is helping \"somewhat.\" At current,  arrangements for her step son are not finalized. Her partner continues to struggle with his mood. The children in her household are aware of events and doing well at this time. Patient reports that baby is growing well and eating well.      Labs / Images Reviewed:   none     Assessment and Plan:     1. Grief at loss of child    2. Postpartum mood disturbance  Continue PRN use of atarax. Patient aware that she can contact me if she needs referral to therapy services or requires medication intervention. No questions at current.     Follow-up: 2-4 weeks    Time at end of call: [phone]  Total Time Spent: 5-10 minutes    NITISH Bean  "

## 2021-06-01 ENCOUNTER — POST PARTUM (OUTPATIENT)
Dept: OBGYN | Facility: CLINIC | Age: 31
End: 2021-06-01
Payer: MEDICAID

## 2021-06-01 VITALS — SYSTOLIC BLOOD PRESSURE: 104 MMHG | DIASTOLIC BLOOD PRESSURE: 78 MMHG | WEIGHT: 146 LBS | BODY MASS INDEX: 28.51 KG/M2

## 2021-06-01 PROBLEM — O99.820 GBS (GROUP B STREPTOCOCCUS CARRIER), +RV CULTURE, CURRENTLY PREGNANT: Status: RESOLVED | Noted: 2021-04-06 | Resolved: 2021-06-01

## 2021-06-01 PROBLEM — O99.019 ANEMIA AFFECTING PREGNANCY: Status: RESOLVED | Noted: 2021-02-11 | Resolved: 2021-06-01

## 2021-06-01 PROCEDURE — 0503F POSTPARTUM CARE VISIT: CPT | Performed by: NURSE PRACTITIONER

## 2021-06-01 RX ORDER — NORGESTIMATE AND ETHINYL ESTRADIOL 0.25-0.035
1 KIT ORAL DAILY
Qty: 84 TABLET | Refills: 4 | Status: SHIPPED | OUTPATIENT
Start: 2021-06-01

## 2021-06-01 ASSESSMENT — ENCOUNTER SYMPTOMS
GASTROINTESTINAL NEGATIVE: 1
NEUROLOGICAL NEGATIVE: 1
MUSCULOSKELETAL NEGATIVE: 1
CONSTITUTIONAL NEGATIVE: 1
PSYCHIATRIC NEGATIVE: 1
CARDIOVASCULAR NEGATIVE: 1
EYES NEGATIVE: 1
RESPIRATORY NEGATIVE: 1

## 2021-06-01 ASSESSMENT — EDINBURGH POSTNATAL DEPRESSION SCALE (EPDS)
I HAVE FELT SCARED OR PANICKY FOR NO GOOD REASON: YES, SOMETIMES
THINGS HAVE BEEN GETTING ON TOP OF ME: YES, SOMETIMES I HAVEN'T BEEN COPING AS WELL AS USUAL
I HAVE BEEN SO UNHAPPY THAT I HAVE BEEN CRYING: ONLY OCCASIONALLY
I HAVE LOOKED FORWARD WITH ENJOYMENT TO THINGS: AS MUCH AS I EVER DID
I HAVE BEEN ABLE TO LAUGH AND SEE THE FUNNY SIDE OF THINGS: AS MUCH AS I ALWAYS COULD
I HAVE BLAMED MYSELF UNNECESSARILY WHEN THINGS WENT WRONG: NO, NEVER
I HAVE FELT SAD OR MISERABLE: NOT VERY OFTEN
I HAVE BEEN SO UNHAPPY THAT I HAVE HAD DIFFICULTY SLEEPING: YES, SOMETIMES
TOTAL SCORE: 10
I HAVE BEEN ANXIOUS OR WORRIED FOR NO GOOD REASON: YES, SOMETIMES
THE THOUGHT OF HARMING MYSELF HAS OCCURRED TO ME: NEVER

## 2021-06-01 ASSESSMENT — FIBROSIS 4 INDEX: FIB4 SCORE: 0.57

## 2021-06-01 NOTE — PROGRESS NOTES
Subjective:    Luba Lind is a 30 y.o. female who presents for her postpartum exam 6 weeks following  on . Her prenatal course was uncomplicated. She denies dysuria, vaginal bleeding, odor, itching or breast problems. She is bottlefeeding. She desires an OCP for her birth control method. Reports unprotected sex yesterday, where they used pull out method prior to this appointment. Eating a regular diet without difficulty. Bowel movement are Normal.  The patient is not having any pain. No longer bleeding. Patient Denies Incisional pain, drainage or redness. Patient denies postpartum depression and feels like coping ok in light of everything going on in their lives right now.  See Michelle notes..  She is taking the vistaril PRN when needed.  She denies need for  consult or referral or need for medication.  Resource of [employer] given to pt and partner as he is having some difficulty coping and would like to talk to counselor but does not have insurance at this time  Review of Systems   Constitutional: Negative.    HENT: Negative.    Eyes: Negative.    Respiratory: Negative.    Cardiovascular: Negative.    Gastrointestinal: Negative.    Genitourinary: Negative.    Musculoskeletal: Negative.    Skin: Negative.    Neurological: Negative.    Endo/Heme/Allergies: Negative.    Psychiatric/Behavioral: Negative.    All other systems reviewed and are negative.    .     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    Constitutional:       Appearance: Normal appearance. She is normal weight.   HENT:      Head: Normocephalic and atraumatic.      Right Ear: External ear normal.      Left Ear: External ear normal.      Nose: Nose normal.      Mouth/Throat:      Mouth: Mucous membranes are moist.      Pharynx: Oropharynx is clear.   Eyes:      Extraocular Movements: Extraocular movements intact.      Conjunctiva/sclera: Conjunctivae normal.      Pupils: Pupils are equal, round, and reactive to light.   Cardiovascular:      Rate and Rhythm: Normal rate.      Pulses: Normal pulses.   Pulmonary:      Effort: Pulmonary effort is normal. No respiratory distress.   Abdominal:      General: Abdomen is flat.      Palpations: Abdomen is soft.   Musculoskeletal:         General: No swelling. Normal range of motion.      Cervical back: Normal range of motion and neck supple.   Skin:     General: Skin is warm and dry.      Capillary Refill: Capillary refill takes 2 to 3 seconds.   Neurological:      General: No focal deficit present.      Mental Status: She is alert and oriented to person, place, and time. Mental status is at baseline.   Psychiatric:         Mood and Affect: Mood normal.         Behavior: Behavior normal.         Thought Content: Thought content normal.         Judgment: Judgment normal.         See PE  Lab: H&H at d/c:   /78   Wt 66.2 kg (146 lb)   LMP 07/18/2020 (Exact Date)   BMI 28.51 kg/m²     Assessment:    1. PP care of lactating women   2. Exam WNL   3. Pap WNL on 9/2020  4. Desires contraception       Plan:    1. Encouraged to seek out further resources for counseling or meds if needed if anxiety or depression worsens  2. Pap due 2023  3. Contraceptive counseling -will start on OCP.  Discussed risks, initiation and to stop if pregnancy occurs  4. Encouraged condom use x 7 days after OCP initiation  5. Discussed diet, exercise and resumption of sexual activity   6. Preconception guidance for next pregnancy if  applicable. No risk factors. Folic acid for all women of childbearing age.   7. Well woman exam yearly

## 2021-06-16 ENCOUNTER — OFFICE VISIT (OUTPATIENT)
Dept: URGENT CARE | Facility: CLINIC | Age: 31
End: 2021-06-16
Payer: MEDICAID

## 2021-06-16 ENCOUNTER — HOSPITAL ENCOUNTER (OUTPATIENT)
Facility: MEDICAL CENTER | Age: 31
End: 2021-06-16
Attending: PHYSICIAN ASSISTANT
Payer: MEDICAID

## 2021-06-16 VITALS
HEART RATE: 71 BPM | HEIGHT: 60 IN | OXYGEN SATURATION: 97 % | SYSTOLIC BLOOD PRESSURE: 110 MMHG | DIASTOLIC BLOOD PRESSURE: 72 MMHG | RESPIRATION RATE: 14 BRPM | WEIGHT: 146 LBS | TEMPERATURE: 98.2 F | BODY MASS INDEX: 28.66 KG/M2

## 2021-06-16 DIAGNOSIS — Z20.822 SUSPECTED COVID-19 VIRUS INFECTION: ICD-10-CM

## 2021-06-16 DIAGNOSIS — R05.9 COUGH: ICD-10-CM

## 2021-06-16 LAB
COVID ORDER STATUS COVID19: NORMAL
SARS-COV-2 RNA RESP QL NAA+PROBE: DETECTED
SPECIMEN SOURCE: ABNORMAL

## 2021-06-16 PROCEDURE — U0003 INFECTIOUS AGENT DETECTION BY NUCLEIC ACID (DNA OR RNA); SEVERE ACUTE RESPIRATORY SYNDROME CORONAVIRUS 2 (SARS-COV-2) (CORONAVIRUS DISEASE [COVID-19]), AMPLIFIED PROBE TECHNIQUE, MAKING USE OF HIGH THROUGHPUT TECHNOLOGIES AS DESCRIBED BY CMS-2020-01-R: HCPCS

## 2021-06-16 PROCEDURE — U0005 INFEC AGEN DETEC AMPLI PROBE: HCPCS

## 2021-06-16 PROCEDURE — 99214 OFFICE O/P EST MOD 30 MIN: CPT | Mod: CS | Performed by: PHYSICIAN ASSISTANT

## 2021-06-16 RX ORDER — BENZONATATE 100 MG/1
100 CAPSULE ORAL 3 TIMES DAILY PRN
Qty: 60 CAPSULE | Refills: 0 | Status: SHIPPED | OUTPATIENT
Start: 2021-06-16

## 2021-06-16 RX ORDER — ALBUTEROL SULFATE 90 UG/1
2 AEROSOL, METERED RESPIRATORY (INHALATION) EVERY 6 HOURS PRN
Qty: 8.5 G | Refills: 0 | Status: SHIPPED | OUTPATIENT
Start: 2021-06-16 | End: 2021-06-26

## 2021-06-16 ASSESSMENT — ENCOUNTER SYMPTOMS
VOMITING: 0
HEADACHES: 1
DIARRHEA: 0
RHINORRHEA: 1
SORE THROAT: 1
SHORTNESS OF BREATH: 0
COUGH: 1
NAUSEA: 0
FEVER: 0
CHILLS: 0
MYALGIAS: 1
ABDOMINAL PAIN: 0
WHEEZING: 0
PALPITATIONS: 0

## 2021-06-16 ASSESSMENT — FIBROSIS 4 INDEX: FIB4 SCORE: 0.57

## 2021-06-16 NOTE — PROGRESS NOTES
Subjective:      Luba Lind is a 30 y.o. female who presents with Nasal Congestion (x 1 week with mild cough, mild sore throat, L ear pain, bodyaches, not taste or smell.   Denies fever.  Not vaccinated for Covid 19. )            Cough  This is a new problem. The current episode started in the past 7 days. The problem has been unchanged. The problem occurs every few minutes. The cough is non-productive. Associated symptoms include ear pain, headaches, myalgias, nasal congestion, rhinorrhea and a sore throat. Pertinent negatives include no chest pain, chills, fever, postnasal drip, shortness of breath or wheezing. The symptoms are aggravated by lying down. She has tried OTC cough suppressant for the symptoms. The treatment provided mild relief. There is no history of asthma or environmental allergies.       PMH:  has a past medical history of Anemia affecting pregnancy (2/11/2021), Fetal pyelectasis and EIC. negative maternal quad screen. declined amniocentesis (12/23/2015), Headache(784.0), Hyperemesis affecting pregnancy, antepartum (8/30/2020), Migraine, and NF2 (neurofibromatosis 2) (Formerly [facility]). She also has no past medical history of Addisons disease (HCC), Adrenal disorder (HCC), Allergy, Anxiety, Arrhythmia, Arthritis, Asthma, Blood transfusion, Cancer (HCC), Cataract, CHF (congestive heart failure) (HCC), Clotting disorder (HCC), COPD, Cushings syndrome (HCC), Depression, Diabetes (HCC), Diabetic neuropathy (HCC), Glaucoma, Goiter, Head ache, Heart attack (HCC), HIV (human immunodeficiency virus infection), Hyperlipidemia, Hypertension, IBD (inflammatory bowel disease), Meningitis, Muscle disorder, OSTEOPOROSIS, Parathyroid disorder (HCC), Pituitary disease (HCC), Pulmonary emphysema (HCC), Seizure (HCC), Stroke (HCC), Substance abuse (HCC), Thyroid disease, Tuberculosis, Ulcer, or Urinary tract infection, site not specified.  MEDS:   Current Outpatient Medications:   •  norgestimate-ethinyl estradiol  (ORTHO-CYCLEN) 0.25-35 MG-MCG per tablet, Take 1 tablet by mouth every day., Disp: 84 tablet, Rfl: 4  •  acetaminophen (TYLENOL) 325 MG Tab, Take 2 Tablets by mouth every four hours as needed., Disp: 30 tablet, Rfl: 0  •  hydrOXYzine HCl (ATARAX) 10 MG Tab, Take 1-2 tabs PO QHS PRN for insomnia or panic; may take 1 tab PO BID for panic (Patient not taking: Reported on 6/16/2021), Disp: 40 tablet, Rfl: 0  •  Butalbital-Acetaminophen  MG Cap, Take  by mouth. (Patient not taking: Reported on 6/16/2021), Disp: , Rfl:   •  Prenatal MV-Min-Fe Fum-FA-DHA (PRENATAL 1 PO), Take  by mouth. (Patient not taking: Reported on 6/16/2021), Disp: , Rfl:   ALLERGIES: No Known Allergies  SURGHX:   Past Surgical History:   Procedure Laterality Date   • OTHER      14 y/o endoscopy; GI bleed resulting     SOCHX:  reports that she quit smoking about 9 years ago. Her smoking use included cigarettes. She smoked 0.00 packs per day for 0.00 years. She has never used smokeless tobacco. She reports previous alcohol use. She reports previous drug use. Drugs: Marijuana and Inhaled.  FH: family history includes Arthritis in her maternal grandmother; Cancer in her maternal grandmother and paternal grandmother; Migraines in her mother; No Known Problems in her brother and sister; Other in her mother.    Review of Systems   Constitutional: Positive for malaise/fatigue. Negative for chills and fever.   HENT: Positive for congestion, ear pain, rhinorrhea and sore throat. Negative for postnasal drip.    Respiratory: Positive for cough. Negative for shortness of breath and wheezing.    Cardiovascular: Negative for chest pain, palpitations and leg swelling.   Gastrointestinal: Negative for abdominal pain, diarrhea, nausea and vomiting.   Musculoskeletal: Positive for myalgias.   Neurological: Positive for headaches.   Endo/Heme/Allergies: Negative for environmental allergies.       Medications, Allergies, and current problem list reviewed today in  Epic     Objective:     /72   Pulse 71   Temp 36.8 °C (98.2 °F) (Temporal)   Resp 14   Ht 1.524 m (5')   Wt 66.2 kg (146 lb)   LMP 07/18/2020 (Exact Date)   SpO2 97%   BMI 28.51 kg/m²      Physical Exam  Vitals and nursing note reviewed.   Constitutional:       General: She is not in acute distress.     Appearance: She is well-developed. She is not ill-appearing, toxic-appearing or diaphoretic.   HENT:      Head: Normocephalic and atraumatic.      Right Ear: Tympanic membrane, ear canal and external ear normal.      Left Ear: Tympanic membrane, ear canal and external ear normal.      Nose: Congestion and rhinorrhea present.      Mouth/Throat:      Mouth: Mucous membranes are moist.      Pharynx: No oropharyngeal exudate or posterior oropharyngeal erythema.   Eyes:      General:         Right eye: No discharge.         Left eye: No discharge.      Conjunctiva/sclera: Conjunctivae normal.   Cardiovascular:      Rate and Rhythm: Normal rate and regular rhythm.      Pulses: Normal pulses.      Heart sounds: Normal heart sounds.   Pulmonary:      Effort: Pulmonary effort is normal. No respiratory distress.      Breath sounds: Normal breath sounds. No wheezing, rhonchi or rales.   Musculoskeletal:         General: No swelling or tenderness. Normal range of motion.      Cervical back: Normal range of motion and neck supple.      Right lower leg: No edema.      Left lower leg: No edema.   Lymphadenopathy:      Cervical: No cervical adenopathy.   Skin:     General: Skin is warm and dry.   Neurological:      Mental Status: She is alert and oriented to person, place, and time.   Psychiatric:         Mood and Affect: Mood normal.         Behavior: Behavior normal.         Thought Content: Thought content normal.         Judgment: Judgment normal.                Assessment/Plan:         1. Suspected COVID-19 virus infection  SARS-CoV-2, PCR (In-House): Collect NP OR nasal swab in [facility]    albuterol 108 (90 Base)  MCG/ACT Aero Soln inhalation aerosol    benzonatate (TESSALON) 100 MG Cap   2. Cough  albuterol 108 (90 Base) MCG/ACT Aero Soln inhalation aerosol    benzonatate (TESSALON) 100 MG Cap     Cough, congestion, fatigue for the last week.  Loss of taste and smell noted.  Denies chest pain, shortness of breath, fevers, leg swelling.  PO2 97% vital signs normal.  Lungs clear bilateral without wheezes rhonchi or rales.  Some nasal congestion and rhinorrhea seen on exam otherwise benign.  Covid testing initiated.  Quarantine per CDC guidelines.  OTC meds and conservative measures as discussed    Return to clinic or go to ED if symptoms worsen or persist. Indications for ED discussed at length. Patient/Parent/Guardian voices understanding. Follow-up with your primary care provider in 3-5 days. Red flag symptoms discussed. All side effects of medication discussed including allergic response, GI upset, tendon injury, rash, sedation etc.    Please note that this dictation was created using voice recognition software. I have made every reasonable attempt to correct obvious errors, but I expect that there are errors of grammar and possibly content that I did not discover before finalizing the note.

## 2021-06-26 ENCOUNTER — APPOINTMENT (OUTPATIENT)
Dept: RADIOLOGY | Facility: IMAGING CENTER | Age: 31
End: 2021-06-26
Attending: NURSE PRACTITIONER
Payer: MEDICAID

## 2021-06-26 ENCOUNTER — OFFICE VISIT (OUTPATIENT)
Dept: URGENT CARE | Facility: CLINIC | Age: 31
End: 2021-06-26
Payer: MEDICAID

## 2021-06-26 VITALS
SYSTOLIC BLOOD PRESSURE: 120 MMHG | DIASTOLIC BLOOD PRESSURE: 60 MMHG | RESPIRATION RATE: 16 BRPM | HEART RATE: 66 BPM | OXYGEN SATURATION: 97 % | BODY MASS INDEX: 28.66 KG/M2 | TEMPERATURE: 97.4 F | HEIGHT: 60 IN | WEIGHT: 146 LBS

## 2021-06-26 DIAGNOSIS — R05.9 COUGH: ICD-10-CM

## 2021-06-26 DIAGNOSIS — J01.10 ACUTE NON-RECURRENT FRONTAL SINUSITIS: ICD-10-CM

## 2021-06-26 DIAGNOSIS — U07.1 COVID-19 VIRUS DETECTED: ICD-10-CM

## 2021-06-26 DIAGNOSIS — R06.02 SOB (SHORTNESS OF BREATH): ICD-10-CM

## 2021-06-26 DIAGNOSIS — G44.89 OTHER HEADACHE SYNDROME: ICD-10-CM

## 2021-06-26 PROCEDURE — 71046 X-RAY EXAM CHEST 2 VIEWS: CPT | Mod: TC,CS | Performed by: NURSE PRACTITIONER

## 2021-06-26 PROCEDURE — 99214 OFFICE O/P EST MOD 30 MIN: CPT | Mod: CS | Performed by: NURSE PRACTITIONER

## 2021-06-26 RX ORDER — ALBUTEROL SULFATE 90 UG/1
2 AEROSOL, METERED RESPIRATORY (INHALATION) EVERY 6 HOURS PRN
Qty: 8.5 G | Refills: 0 | Status: SHIPPED | OUTPATIENT
Start: 2021-06-26

## 2021-06-26 RX ORDER — AMOXICILLIN AND CLAVULANATE POTASSIUM 875; 125 MG/1; MG/1
1 TABLET, FILM COATED ORAL 2 TIMES DAILY
Qty: 14 TABLET | Refills: 0 | Status: SHIPPED | OUTPATIENT
Start: 2021-06-26 | End: 2021-07-03

## 2021-06-26 ASSESSMENT — FIBROSIS 4 INDEX: FIB4 SCORE: 0.57

## 2021-06-26 ASSESSMENT — ENCOUNTER SYMPTOMS
MYALGIAS: 1
PSYCHIATRIC NEGATIVE: 1
EYES NEGATIVE: 1
CHILLS: 1
FEVER: 1
COUGH: 1
SHORTNESS OF BREATH: 1
HEADACHES: 1
SPUTUM PRODUCTION: 1
GASTROINTESTINAL NEGATIVE: 1
SINUS PAIN: 1

## 2021-06-26 NOTE — PROGRESS NOTES
Subjective:   Luba Lind is a 30 y.o. female who presents for Cough (covid POS, chest pain, cough, no smell, not feeling any better)       Cough  This is a new problem. The current episode started in the past 7 days. The problem has been gradually worsening. The problem occurs every few minutes. The cough is productive of sputum. Associated symptoms include chest pain, chills, ear congestion, a fever, headaches, myalgias, nasal congestion and shortness of breath. Nothing aggravates the symptoms. Risk factors: current +Covid. She has tried OTC cough suppressant, rest and prescription cough suppressant for the symptoms. The treatment provided no relief. current +Covid     Pt presents for evaluation of a new problem, reports being diagnosed with Covid 10 days ago.  Patient states that she initially began to feel better, however the past few days has had increased chest pain, productive cough, right sinus pain, right headache, and fatigue.    Review of Systems   Constitutional: Positive for chills, fever and malaise/fatigue.   HENT: Positive for congestion and sinus pain.    Eyes: Negative.    Respiratory: Positive for cough, sputum production and shortness of breath.    Cardiovascular: Positive for chest pain.   Gastrointestinal: Negative.    Genitourinary: Negative.    Musculoskeletal: Positive for myalgias.   Skin: Negative.    Neurological: Positive for headaches.   Psychiatric/Behavioral: Negative.    All other systems reviewed and are negative.      MEDS:   Current Outpatient Medications:   •  albuterol 108 (90 Base) MCG/ACT Aero Soln inhalation aerosol, Inhale 2 Puffs every 6 hours as needed for Shortness of Breath., Disp: 8.5 g, Rfl: 0  •  benzonatate (TESSALON) 100 MG Cap, Take 1 capsule by mouth 3 times a day as needed for Cough., Disp: 60 capsule, Rfl: 0  •  norgestimate-ethinyl estradiol (ORTHO-CYCLEN) 0.25-35 MG-MCG per tablet, Take 1 tablet by mouth every day., Disp: 84 tablet, Rfl: 4  •   hydrOXYzine HCl (ATARAX) 10 MG Tab, Take 1-2 tabs PO QHS PRN for insomnia or panic; may take 1 tab PO BID for panic (Patient not taking: Reported on 6/16/2021), Disp: 40 tablet, Rfl: 0  •  acetaminophen (TYLENOL) 325 MG Tab, Take 2 Tablets by mouth every four hours as needed., Disp: 30 tablet, Rfl: 0  •  Butalbital-Acetaminophen  MG Cap, Take  by mouth. (Patient not taking: Reported on 6/16/2021), Disp: , Rfl:   •  Prenatal MV-Min-Fe Fum-FA-DHA (PRENATAL 1 PO), Take  by mouth. (Patient not taking: Reported on 6/16/2021), Disp: , Rfl:   ALLERGIES: No Known Allergies    Patient's PMH, SocHx, SurgHx, FamHx, Drug allergies and medications were reviewed.     Objective:   /60 (BP Location: Left arm, Patient Position: Sitting, BP Cuff Size: Adult long)   Pulse 66   Temp 36.3 °C (97.4 °F) (Temporal)   Resp 16   Ht 1.524 m (5')   Wt 66.2 kg (146 lb)   SpO2 97%   BMI 28.51 kg/m²     Physical Exam  Vitals and nursing note reviewed.   Constitutional:       General: She is awake.      Appearance: Normal appearance. She is well-developed and normal weight.   HENT:      Head: Normocephalic and atraumatic.      Right Ear: Tympanic membrane, ear canal and external ear normal.      Left Ear: Tympanic membrane, ear canal and external ear normal.      Nose: Nasal tenderness and congestion present.      Right Turbinates: Enlarged.      Left Turbinates: Enlarged.      Right Sinus: Maxillary sinus tenderness and frontal sinus tenderness present.      Mouth/Throat:      Lips: Pink.      Mouth: Mucous membranes are moist.      Pharynx: Oropharynx is clear. Uvula midline.   Eyes:      Extraocular Movements: Extraocular movements intact.      Conjunctiva/sclera: Conjunctivae normal.      Pupils: Pupils are equal, round, and reactive to light.   Neck:      Thyroid: No thyromegaly.      Trachea: Trachea normal.   Cardiovascular:      Rate and Rhythm: Normal rate and regular rhythm.      Pulses: Normal pulses.      Heart  sounds: Normal heart sounds, S1 normal and S2 normal.   Pulmonary:      Effort: Pulmonary effort is normal. No respiratory distress.      Breath sounds: Normal air entry. Examination of the right-upper field reveals decreased breath sounds. Examination of the left-upper field reveals decreased breath sounds and rhonchi. Examination of the right-middle field reveals rhonchi. Examination of the right-lower field reveals rhonchi. Examination of the left-lower field reveals rhonchi. Decreased breath sounds and rhonchi present. No wheezing or rales.   Abdominal:      General: Bowel sounds are normal.      Palpations: Abdomen is soft.   Musculoskeletal:         General: Normal range of motion.      Cervical back: Full passive range of motion without pain, normal range of motion and neck supple.   Lymphadenopathy:      Cervical: No cervical adenopathy.   Skin:     General: Skin is warm and dry.      Capillary Refill: Capillary refill takes less than 2 seconds.   Neurological:      General: No focal deficit present.      Mental Status: She is alert and oriented to person, place, and time.      Gait: Gait is intact.   Psychiatric:         Attention and Perception: Attention and perception normal.         Mood and Affect: Mood normal.         Speech: Speech normal.         Behavior: Behavior normal. Behavior is cooperative.         Thought Content: Thought content normal.         Judgment: Judgment normal.       Narrative & Impression     6/26/2021 10:41 AM     HISTORY/REASON FOR EXAM:  Cough; Covid +, sx worse     TECHNIQUE/EXAM DESCRIPTION AND NUMBER OF VIEWS:  Two views of the chest.     COMPARISON:  None available.     FINDINGS:  Cardiomediastinal contour is within normal limits.  No focal pulmonary consolidation.  No pleural fluid collection or pneumothorax.  Bibasilar nipple shadows.  No major bony abnormality is seen.     IMPRESSION:     No acute cardiopulmonary disease.         Last Resulted: 06/26/21 11:05 AM             Assessment/Plan:   Assessment    1. COVID-19 virus detected  - DX-CHEST-2 VIEWS; Future  - albuterol 108 (90 Base) MCG/ACT Aero Soln inhalation aerosol; Inhale 2 Puffs every 6 hours as needed for Shortness of Breath.  Dispense: 8.5 g; Refill: 0    2. Acute non-recurrent frontal sinusitis  - amoxicillin-clavulanate (AUGMENTIN) 875-125 MG Tab; Take 1 tablet by mouth 2 times a day for 7 days.  Dispense: 14 tablet; Refill: 0    3. Cough  - DX-CHEST-2 VIEWS; Future  - albuterol 108 (90 Base) MCG/ACT Aero Soln inhalation aerosol; Inhale 2 Puffs every 6 hours as needed for Shortness of Breath.  Dispense: 8.5 g; Refill: 0    4. SOB (shortness of breath)  - DX-CHEST-2 VIEWS; Future  - albuterol 108 (90 Base) MCG/ACT Aero Soln inhalation aerosol; Inhale 2 Puffs every 6 hours as needed for Shortness of Breath.  Dispense: 8.5 g; Refill: 0    5. Other headache syndrome    Vital signs stable at today's acute urgent care visit.  Reviewed test results that were completed in the clinic, negative for acute process.  Begin medications as listed for acute sinusitis, discussed with her that antibiotics will not help her Covid symptoms. Discussed management options (risks, benefits, and alternatives to treatment), to include the counter decongestant such as Mucinex.     Advised the patient to follow-up with the primary care provider for recheck, reevaluation, and/or consideration of further management if necessary. Return to urgent care with any worsening symptoms or if there is no improvement in their current condition. Red flags discussed and indications to immediately call 911 or present to the ED.  All questions were encouraged and answered to the patient's satisfaction and understanding, and they agree to the plan of care.     I personally reviewed prior external notes and test results pertinent to today's visit.  I have independently reviewed and interpreted all diagnostics ordered during this urgent care acute visit. Time  spent evaluating this patient was a minimum of 30 minutes and includes preparing for visit, counseling/education, exam, evaluation, obtaining history, and ordering lab/test/procedures.      Please note that this dictation was created using voice recognition software. I have made a reasonable attempt to correct obvious errors, but I expect that there are errors of grammar and possibly content that I did not discover before finalizing the note.

## 2022-01-14 ENCOUNTER — OFFICE VISIT (OUTPATIENT)
Dept: URGENT CARE | Facility: CLINIC | Age: 32
End: 2022-01-14
Payer: COMMERCIAL

## 2022-01-14 ENCOUNTER — HOSPITAL ENCOUNTER (OUTPATIENT)
Facility: MEDICAL CENTER | Age: 32
End: 2022-01-14
Attending: FAMILY MEDICINE
Payer: COMMERCIAL

## 2022-01-14 VITALS
OXYGEN SATURATION: 97 % | WEIGHT: 133.4 LBS | RESPIRATION RATE: 16 BRPM | HEART RATE: 110 BPM | TEMPERATURE: 97.1 F | SYSTOLIC BLOOD PRESSURE: 100 MMHG | BODY MASS INDEX: 26.19 KG/M2 | HEIGHT: 60 IN | DIASTOLIC BLOOD PRESSURE: 60 MMHG

## 2022-01-14 DIAGNOSIS — Z03.818 ENCOUNTER FOR PATIENT CONCERN ABOUT EXPOSURE TO INFECTIOUS ORGANISM: ICD-10-CM

## 2022-01-14 DIAGNOSIS — R00.0 TACHYCARDIA: ICD-10-CM

## 2022-01-14 LAB — COVID ORDER STATUS COVID19: NORMAL

## 2022-01-14 PROCEDURE — 99213 OFFICE O/P EST LOW 20 MIN: CPT | Mod: CS | Performed by: FAMILY MEDICINE

## 2022-01-14 PROCEDURE — U0005 INFEC AGEN DETEC AMPLI PROBE: HCPCS

## 2022-01-14 PROCEDURE — U0003 INFECTIOUS AGENT DETECTION BY NUCLEIC ACID (DNA OR RNA); SEVERE ACUTE RESPIRATORY SYNDROME CORONAVIRUS 2 (SARS-COV-2) (CORONAVIRUS DISEASE [COVID-19]), AMPLIFIED PROBE TECHNIQUE, MAKING USE OF HIGH THROUGHPUT TECHNOLOGIES AS DESCRIBED BY CMS-2020-01-R: HCPCS

## 2022-01-14 RX ORDER — RIZATRIPTAN BENZOATE 5 MG/1
TABLET ORAL
COMMUNITY
Start: 2021-12-31

## 2022-01-14 ASSESSMENT — FIBROSIS 4 INDEX: FIB4 SCORE: 0.58

## 2022-01-14 NOTE — PROGRESS NOTES
Subjective:      31 y.o. female presents to urgent care for cold symptoms that started Wednesday.  She is experiencing sore throat, runny nose, and cough.  No associated diarrhea or body aches.  She has been using Tylenol which is helping her feel better. She denies any tobacco product use. No history of asthma or COPD. She is not vaccinated against COVID.  Her sister tested positive for COVID last week.    She denies any other questions or concerns at this time.    Current problem list, medication, and past medical/surgical history were reviewed in Epic.    ROS  See HPI     Objective:      /60 (BP Location: Right arm, Patient Position: Sitting, BP Cuff Size: Adult long)   Pulse (!) 110   Temp 36.2 °C (97.1 °F) (Temporal)   Resp 16   Ht 1.524 m (5')   Wt 60.5 kg (133 lb 6.4 oz)   SpO2 97%   BMI 26.05 kg/m²     Physical Exam  Constitutional:       General: She is not in acute distress.     Appearance: She is not diaphoretic.   HENT:      Mouth/Throat:      Pharynx: No posterior oropharyngeal erythema.      Tonsils: No tonsillar exudate. 2+ on the right. 2+ on the left.   Cardiovascular:      Rate and Rhythm: Normal rate and regular rhythm.      Heart sounds: Normal heart sounds.   Pulmonary:      Effort: Pulmonary effort is normal. No respiratory distress.      Breath sounds: Normal breath sounds.   Neurological:      Mental Status: She is alert.   Psychiatric:         Mood and Affect: Affect normal.         Judgment: Judgment normal.       Assessment/Plan:     1. Encounter for patient concern about exposure to infectious organism  2. Tachycardia  Systemic symptoms seen through tachycardia. Testing performed for COVID-19. In the meantime patient was advised to isolate until COVID test results returned. I encouraged mask use, frequent handwashing, wiping down hard surfaces, etc. Tylenol and Ibuprofen were recommended for symptomatic relief. If positive they will be contacted by their local health  department regarding return to work/school protocols. Patient is currently without indication of need for higher level of care. Patient/Guardian was given precautionary signs/symptoms that mandate immediate follow up and evaluation in the ED. The patient and/or guardian demonstrated a good understanding and agreed with the treatment plan.  - SARS-CoV-2 PCR (24 hour In-House): Collect NP swab in [facility]; Future      Instructed to return to Urgent Care or nearest Emergency Department if symptoms fail to improve, for any change in condition, further concerns, or new concerning symptoms. Patient states understanding of the plan of care and discharge instructions.    Destiney Arana M.D.

## 2022-01-15 LAB
SARS-COV-2 RNA RESP QL NAA+PROBE: NOTDETECTED
SPECIMEN SOURCE: NORMAL

## 2022-09-24 ENCOUNTER — OFFICE VISIT (OUTPATIENT)
Dept: URGENT CARE | Facility: CLINIC | Age: 32
End: 2022-09-24
Payer: COMMERCIAL

## 2022-09-24 VITALS
HEART RATE: 82 BPM | DIASTOLIC BLOOD PRESSURE: 68 MMHG | RESPIRATION RATE: 18 BRPM | TEMPERATURE: 98.6 F | HEIGHT: 60 IN | OXYGEN SATURATION: 100 % | BODY MASS INDEX: 24.58 KG/M2 | WEIGHT: 125.2 LBS | SYSTOLIC BLOOD PRESSURE: 120 MMHG

## 2022-09-24 DIAGNOSIS — J02.9 PHARYNGITIS, UNSPECIFIED ETIOLOGY: ICD-10-CM

## 2022-09-24 DIAGNOSIS — J06.9 VIRAL URI: ICD-10-CM

## 2022-09-24 LAB
EXTERNAL QUALITY CONTROL: NORMAL
FLUAV+FLUBV AG SPEC QL IA: NEGATIVE
INT CON NEG: NORMAL
INT CON POS: NORMAL
S PYO AG THROAT QL: NEGATIVE
SARS-COV+SARS-COV-2 AG RESP QL IA.RAPID: NEGATIVE

## 2022-09-24 PROCEDURE — 99213 OFFICE O/P EST LOW 20 MIN: CPT | Performed by: NURSE PRACTITIONER

## 2022-09-24 PROCEDURE — 87880 STREP A ASSAY W/OPTIC: CPT | Performed by: NURSE PRACTITIONER

## 2022-09-24 PROCEDURE — 87804 INFLUENZA ASSAY W/OPTIC: CPT | Performed by: NURSE PRACTITIONER

## 2022-09-24 PROCEDURE — 87426 SARSCOV CORONAVIRUS AG IA: CPT | Performed by: NURSE PRACTITIONER

## 2022-09-24 RX ORDER — AMOXICILLIN 500 MG/1
500 CAPSULE ORAL 2 TIMES DAILY
COMMUNITY

## 2022-09-24 ASSESSMENT — FIBROSIS 4 INDEX: FIB4 SCORE: 0.6

## 2022-09-24 NOTE — PROGRESS NOTES
Patient has consented to treatment and for use of patient information for treatment and billing purposes.    Date: 09/24/22     Arrival Mode: Private Vehicle / Ambulatory    Chief Complaint:    Chief Complaint   Patient presents with    Pharyngitis     Cough/green phlegm/ear pain/body aches/fever/x2days        History of Present Illness: 32 y.o. female  presents 2-day history of sore throat bilateral ear pain body aches fever and a intermittent cough.  Patient states cough is not significant although is annoying at times.  Cough is not keeping her awake at night.  Patient does work in a  so she would like to ensure she does not have COVID or strep that she could spread it to the children.  Patient denies any rashes on the palms of her hands or the soles of feet.  Patient states there is no known illness traveling throughout the .  Denies shortness of breath chest pain lower leg swelling.  No nausea vomiting diarrhea.  Patient is taking ibuprofen once daily for symptoms.  Patient states ibuprofen is not significantly helping.      ROS:  As stated in HPI     Pertinent Medical History:    Past Medical History:   Diagnosis Date    Anemia affecting pregnancy 2/11/2021    Fetal pyelectasis and EIC. negative maternal quad screen. declined amniocentesis 12/23/2015    Headache(784.0)     before and after pregnancy.    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        Pertinent Surgical History:    Past Surgical History:   Procedure Laterality Date    OTHER      14 y/o endoscopy; GI bleed resulting        Current  Medications:  Current Outpatient Medications on File Prior to Visit   Medication Sig Dispense Refill    amoxicillin (AMOXIL) 500 MG Cap Take 500 mg by mouth 2 times a day.      rizatriptan (MAXALT) 5 MG tablet       norgestimate-ethinyl estradiol (ORTHO-CYCLEN) 0.25-35 MG-MCG per tablet Take 1 tablet by mouth every  day. 84 tablet 4    albuterol 108 (90 Base) MCG/ACT Aero Soln inhalation aerosol Inhale 2 Puffs every 6 hours as needed for Shortness of Breath. (Patient not taking: Reported on 9/24/2022) 8.5 g 0    benzonatate (TESSALON) 100 MG Cap Take 1 capsule by mouth 3 times a day as needed for Cough. (Patient not taking: Reported on 9/24/2022) 60 capsule 0    acetaminophen (TYLENOL) 325 MG Tab Take 2 Tablets by mouth every four hours as needed. (Patient not taking: Reported on 9/24/2022) 30 tablet 0     No current facility-administered medications on file prior to visit.        Allergies:     Patient has no known allergies.     Social History:   Social History     Socioeconomic History    Marital status: Single     Spouse name: Not on file    Number of children: Not on file    Years of education: Not on file    Highest education level: Not on file   Occupational History    Not on file   Tobacco Use    Smoking status: Former     Packs/day: 0.00     Years: 0.00     Pack years: 0.00     Types: Cigarettes     Quit date: 1/19/2012     Years since quitting: 10.6    Smokeless tobacco: Never    Tobacco comments:     1 cig every week or 2 weeks.   Vaping Use    Vaping Use: Never used   Substance and Sexual Activity    Alcohol use: Not Currently     Comment: 1-2x/month prior to current pregnancy    Drug use: Not Currently     Types: Marijuana, Inhaled     Comment: Last smoked Marijuana 08/20    Sexual activity: Yes     Partners: Male     Birth control/protection: None   Other Topics Concern    Not on file   Social History Narrative    Not on file     Social Determinants of Health     Financial Resource Strain: Not on file   Food Insecurity: Not on file   Transportation Needs: Not on file   Physical Activity: Not on file   Stress: Not on file   Social Connections: Not on file   Intimate Partner Violence: Not on file   Housing Stability: Not on file        Patient's last menstrual period was 09/01/2022 (exact date).       Physical  Exam:  Vitals:    09/24/22 1023   BP: 120/68   Pulse: 82   Resp: 18   Temp: 37 °C (98.6 °F)   SpO2: 100%        Physical Exam  Vitals reviewed.   Constitutional:       General: She is not in acute distress.     Appearance: Normal appearance. She is well-developed. She is not toxic-appearing.   HENT:      Head: Normocephalic and atraumatic.      Right Ear: Tympanic membrane, ear canal and external ear normal.      Left Ear: Tympanic membrane, ear canal and external ear normal.      Nose: Congestion and rhinorrhea present.      Mouth/Throat:      Lips: Pink.      Mouth: Mucous membranes are moist.      Pharynx: Posterior oropharyngeal erythema present.   Eyes:      General: Lids are normal. Gaze aligned appropriately. No allergic shiner or scleral icterus.     Extraocular Movements: Extraocular movements intact.      Conjunctiva/sclera: Conjunctivae normal.      Pupils: Pupils are equal, round, and reactive to light.   Cardiovascular:      Rate and Rhythm: Normal rate and regular rhythm.      Pulses:           Radial pulses are 2+ on the right side and 2+ on the left side.      Heart sounds: Normal heart sounds.   Pulmonary:      Effort: Pulmonary effort is normal.      Breath sounds: Normal breath sounds. No wheezing.   Musculoskeletal:      Right lower leg: No edema.      Left lower leg: No edema.   Lymphadenopathy:      Cervical: No cervical adenopathy.   Skin:     General: Skin is warm.      Capillary Refill: Capillary refill takes less than 2 seconds.      Coloration: Skin is not cyanotic or pale.      Findings: No rash.   Neurological:      Mental Status: She is alert.      Gait: Gait is intact.   Psychiatric:         Behavior: Behavior normal. Behavior is cooperative.        Diagnostics:    Strep negative  COVID-19 in clinic negative  Influenza negative  Medical Decision Making:  I personally reviewed prior external notes and test results pertinent to today's visit.   Shared decision-making was utilized with  patient did develop treatment plan and clinic course. Tran, 32 y.o. female 2-day history of viral URI-like symptoms.  Did test for strep COVID and influenza due to patient working in a  fortunately all 3 are negative.  If patient has fever tomorrow she needs to not attend work on Monday.  Will provide work note.    Did advise patient on conservative measures for management of symptoms.  Patient is agreeable to pursue adequate rest, adequate hydration, saltwater gargle and Neti pot for any symptoms of upper respiratory congestion.  Over-the-counter analgesia and antipyretics on a p.r.n. basis as needed for pain and fever.  Did discuss over-the-counter cough medications.      Patient will monitor symptoms closely for worsening and is advised to seek further evaluation the emergency room if alarm signs or symptoms arise.  Patient states understanding and verbalizes agreement with this plan of care.    1. Pharyngitis, unspecified etiology    - POCT Influenza A/B  - POCT Rapid Strep A    2. Viral URI    - POCT SARS-COV Antigen ALETA (Symptomatic only)  - POCT Influenza A/B       Disposition:  Patient was discharged in stable condition.    Voice Recognition Disclaimer: Portions of this document were created using voice recognition software. The software does have a chance of producing errors of grammar and possibly content. I have made every reasonable attempt to correct obvious errors, but there may be errors of grammar and possibly content that I did not discover before finalizing the documentation.    Berta Alvarado, A.P.R.N.

## 2022-09-24 NOTE — LETTER
September 24, 2022    To Whom It May Concern:         This is confirmation that Luba Metcalf Lind attended her scheduled appointment with ALLEN Kidd on 9/24/22. Please excuse 9/26/22. May return 9/27 as long as improved.          If you have any questions please do not hesitate to call me at the phone number listed below.    Sincerely,          GERI Kidd.  [phone]

## 2022-09-24 NOTE — PATIENT INSTRUCTIONS
Please be sure to compare all medications used to ensure you are not taking duplicate medications.     Cough     -Over the counter cough medications (robitussin DM, mucinex D, if you have high blood pressure discuss with pharmacist )   -warm tea with honey   - sleep propped on pillows   -cool mist humidifier   -staying hydrated keeps mucus thin       Fever and body aches     -Acetaminophen(tylenol) as needed every 4-6 hours. Do not exceed 3,000mg in a  24 hour period.   -Ibuprofen (Advil)  400 mg as needed every 6-8 hours. Do not exceed 1,200 mg a day.       Nasal congestion     -Over the counter Sudafed from behind the pharmacy (discuss with pharmacist)   - Flonase   -Nasal saline or Neti pod   -Afrin, do not use for more than 3 days. If used for more than 3 days can cause rebound congestion   -staying hydrated keeps mucus thin       Sore throat     - Warm salt water gargles   -Tylenol and ibuprofen as above   -cold drinks or treats

## 2022-11-14 ENCOUNTER — OFFICE VISIT (OUTPATIENT)
Dept: URGENT CARE | Facility: CLINIC | Age: 32
End: 2022-11-14
Payer: COMMERCIAL

## 2022-11-14 VITALS
RESPIRATION RATE: 14 BRPM | WEIGHT: 121.6 LBS | OXYGEN SATURATION: 98 % | SYSTOLIC BLOOD PRESSURE: 118 MMHG | HEIGHT: 60 IN | TEMPERATURE: 98.1 F | HEART RATE: 95 BPM | BODY MASS INDEX: 23.87 KG/M2 | DIASTOLIC BLOOD PRESSURE: 72 MMHG

## 2022-11-14 DIAGNOSIS — R11.2 NAUSEA AND VOMITING, UNSPECIFIED VOMITING TYPE: ICD-10-CM

## 2022-11-14 DIAGNOSIS — U07.1 COVID-19 VIRUS INFECTION: ICD-10-CM

## 2022-11-14 LAB
EXTERNAL QUALITY CONTROL: ABNORMAL
FLUAV+FLUBV AG SPEC QL IA: NEGATIVE
INT CON NEG: NEGATIVE
INT CON NEG: NEGATIVE
INT CON POS: POSITIVE
INT CON POS: POSITIVE
SARS-COV+SARS-COV-2 AG RESP QL IA.RAPID: POSITIVE

## 2022-11-14 PROCEDURE — 87426 SARSCOV CORONAVIRUS AG IA: CPT | Performed by: STUDENT IN AN ORGANIZED HEALTH CARE EDUCATION/TRAINING PROGRAM

## 2022-11-14 PROCEDURE — 87804 INFLUENZA ASSAY W/OPTIC: CPT | Performed by: STUDENT IN AN ORGANIZED HEALTH CARE EDUCATION/TRAINING PROGRAM

## 2022-11-14 PROCEDURE — 99214 OFFICE O/P EST MOD 30 MIN: CPT | Mod: CS | Performed by: STUDENT IN AN ORGANIZED HEALTH CARE EDUCATION/TRAINING PROGRAM

## 2022-11-14 RX ORDER — ONDANSETRON 4 MG/1
4 TABLET, ORALLY DISINTEGRATING ORAL EVERY 6 HOURS PRN
Qty: 20 TABLET | Refills: 0 | Status: SHIPPED | OUTPATIENT
Start: 2022-11-14

## 2022-11-14 ASSESSMENT — FIBROSIS 4 INDEX: FIB4 SCORE: 0.6

## 2022-11-14 NOTE — LETTER
November 14, 2022       Patient: Luba Lind   YOB: 1990   Date of Visit: 11/14/2022         To Whom It May Concern:     Luba Lind tested positive for COVID-19 and is cleared to return to work on Thursday, 11/17/2022 per CDC quarantine guidelines.    If you have any questions or concerns, please don't hesitate to call [phone]      Sincerely,      Dr Kurt Rush, D.O.  Electronically Signed

## 2022-11-14 NOTE — PROGRESS NOTES
Subjective:   CHIEF COMPLAINT  Chief Complaint   Patient presents with    Vomiting     X 4 days with  headache, sore throat, body aches, nausea, bilateral ear pain, body aches, fever and chills. Pt. Had a light Positive Covid home test yesterday.        HPI  Luba Lind is a 32 y.o. female here with COVID-19 presents with a chief complaint of headache, body ache, sore throat, bilateral earache and vomiting x3 days.  No emesis today.  No diarrhea.  She is tried using TheraFlu, Tylenol and ibuprofen which have not helped.  Positive ROS for slight cough, shortness of breath and feeling warm.  No wheezing.  She is not vaccinated against COVID.    REVIEW OF SYSTEMS  General: no fever or chills  GI: no nausea or vomiting  See HPI for further details.    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    ALLERGIES  No Known Allergies    CURRENT MEDICATIONS  acetaminophen Tabs  albuterol Aers  amoxicillin Caps  benzonatate Caps  norgestimate-ethinyl estradiol  rizatriptan    SOCIAL HISTORY  Social History     Tobacco Use    Smoking status: Former     Packs/day: 0.00     Years: 0.00     Pack years: 0.00     Types: Cigarettes     Quit date: 1/19/2012     Years since quitting: 10.8    Smokeless tobacco: Never    Tobacco comments:     1 cig every week or 2 weeks.   Vaping Use    Vaping Use: Never used   Substance and Sexual Activity    Alcohol use: Not Currently     Comment: 1-2x/month prior to current pregnancy    Drug use: Not Currently     Types: Marijuana, Inhaled     Comment: Last smoked Marijuana 08/20    Sexual activity: Yes     Partners: Male     Birth control/protection: None       FAMILY HISTORY  Family History    Problem Relation Age of Onset    Arthritis Maternal Grandmother     Cancer Maternal Grandmother     Cancer Paternal Grandmother     Other Mother         NF1    Migraines Mother     No Known Problems Sister     No Known Problems Brother           Objective:   PHYSICAL EXAM  VITAL SIGNS: /72   Pulse 95   Temp 36.7 °C (98.1 °F) (Temporal)   Resp 14   Ht 1.524 m (5')   Wt 55.2 kg (121 lb 9.6 oz)   LMP 11/08/2022   SpO2 98%   BMI 23.75 kg/m²     Gen: no acute distress, normal voice  Skin: dry, intact, moist mucosal membranes  Eyes: No conjunctival injection bilaterally.  Neck: Normal range of motion. No meningeal signs.   Lungs: CTAB w/ symmetric expansion  CV: RRR w/o murmurs or clicks  Psych: normal affect, normal judgement, alert, awake    Assessment/Plan:     1. COVID-19 virus infection  POCT Influenza A/B    POCT SARS-COV Antigen ALETA Manual Result    ondansetron (ZOFRAN ODT) 4 MG TABLET DISPERSIBLE      2. Nausea and vomiting, unspecified vomiting type  ondansetron (ZOFRAN ODT) 4 MG TABLET DISPERSIBLE      Patient tested  positive for COVID-19 which would explain her symptoms.  She is not vaccinated against COVID.  Recommended symptomatic treatment with ibuprofen and Tylenol.  A prescription for Zofran was sent to the pharmacy to aid in p.o. intake and patient was instructed to push fluids.  Lastly a note was provided for her employer.  Return to urgent care any new/worsening symptoms or further questions or concerns.  Patient understood everything discussed.  All questions were answered.      Differential diagnosis, natural history, supportive care, and indications for immediate follow-up discussed. All questions answered. Patient agrees with the plan of care.    Follow-up as needed if symptoms worsen or fail to improve to PCP, Urgent care or Emergency Room.    1 acute illness with systemic symptoms (nausea vomiting), ordered prescription medication.    Please note that this dictation was created  using voice recognition software. I have made a reasonable attempt to correct obvious errors, but I expect that there are errors of grammar and possibly content that I did not discover before finalizing the note.

## 2025-08-18 ENCOUNTER — OFFICE VISIT (OUTPATIENT)
Dept: URGENT CARE | Facility: CLINIC | Age: 35
End: 2025-08-18
Payer: COMMERCIAL

## 2025-08-18 VITALS
DIASTOLIC BLOOD PRESSURE: 82 MMHG | BODY MASS INDEX: 25.04 KG/M2 | HEIGHT: 61 IN | WEIGHT: 132.6 LBS | TEMPERATURE: 98.5 F | RESPIRATION RATE: 17 BRPM | HEART RATE: 80 BPM | OXYGEN SATURATION: 97 % | SYSTOLIC BLOOD PRESSURE: 122 MMHG

## 2025-08-18 DIAGNOSIS — U07.1 COVID-19 VIRUS INFECTION: Primary | ICD-10-CM

## 2025-08-18 LAB
FLUAV RNA SPEC QL NAA+PROBE: NEGATIVE
FLUBV RNA SPEC QL NAA+PROBE: NEGATIVE
RSV RNA SPEC QL NAA+PROBE: NEGATIVE
SARS-COV-2 RNA RESP QL NAA+PROBE: POSITIVE

## 2025-08-18 PROCEDURE — 3074F SYST BP LT 130 MM HG: CPT | Performed by: PHYSICIAN ASSISTANT

## 2025-08-18 PROCEDURE — 3079F DIAST BP 80-89 MM HG: CPT | Performed by: PHYSICIAN ASSISTANT

## 2025-08-18 PROCEDURE — 99213 OFFICE O/P EST LOW 20 MIN: CPT | Performed by: PHYSICIAN ASSISTANT

## 2025-08-18 PROCEDURE — 87637 SARSCOV2&INF A&B&RSV AMP PRB: CPT | Performed by: PHYSICIAN ASSISTANT

## 2025-08-18 RX ORDER — PROPRANOLOL HYDROCHLORIDE 40 MG/1
40 TABLET ORAL
COMMUNITY
Start: 2025-08-13

## 2025-08-18 ASSESSMENT — LIFESTYLE VARIABLES
HOW OFTEN DO YOU HAVE SIX OR MORE DRINKS ON ONE OCCASION: LESS THAN MONTHLY
SKIP TO QUESTIONS 9-10: 0
HOW OFTEN DO YOU HAVE SIX OR MORE DRINKS ON ONE OCCASION: NEVER
SKIP TO QUESTIONS 9-10: 1
HOW MANY STANDARD DRINKS CONTAINING ALCOHOL DO YOU HAVE ON A TYPICAL DAY: PATIENT DOES NOT DRINK
AUDIT-C TOTAL SCORE: 2
HOW OFTEN DO YOU HAVE A DRINK CONTAINING ALCOHOL: MONTHLY OR LESS
HOW MANY STANDARD DRINKS CONTAINING ALCOHOL DO YOU HAVE ON A TYPICAL DAY: 1 OR 2
AUDIT-C TOTAL SCORE: 0
HOW OFTEN DO YOU HAVE A DRINK CONTAINING ALCOHOL: NEVER